# Patient Record
Sex: MALE | Race: WHITE | NOT HISPANIC OR LATINO | Employment: FULL TIME | URBAN - METROPOLITAN AREA
[De-identification: names, ages, dates, MRNs, and addresses within clinical notes are randomized per-mention and may not be internally consistent; named-entity substitution may affect disease eponyms.]

---

## 2022-06-01 ENCOUNTER — OFFICE VISIT (OUTPATIENT)
Dept: FAMILY MEDICINE | Facility: CLINIC | Age: 52
End: 2022-06-01
Payer: COMMERCIAL

## 2022-06-01 ENCOUNTER — PATIENT OUTREACH (OUTPATIENT)
Dept: ONCOLOGY | Facility: CLINIC | Age: 52
End: 2022-06-01

## 2022-06-01 VITALS
RESPIRATION RATE: 18 BRPM | DIASTOLIC BLOOD PRESSURE: 80 MMHG | HEART RATE: 67 BPM | TEMPERATURE: 98.2 F | SYSTOLIC BLOOD PRESSURE: 120 MMHG | WEIGHT: 182 LBS | OXYGEN SATURATION: 98 % | BODY MASS INDEX: 26.96 KG/M2 | HEIGHT: 69 IN

## 2022-06-01 DIAGNOSIS — E83.110 HEREDITARY HEMOCHROMATOSIS (H): Primary | ICD-10-CM

## 2022-06-01 DIAGNOSIS — Z13.220 SCREENING FOR HYPERLIPIDEMIA: ICD-10-CM

## 2022-06-01 DIAGNOSIS — Z00.00 HEALTHCARE MAINTENANCE: ICD-10-CM

## 2022-06-01 DIAGNOSIS — Z76.89 ESTABLISHING CARE WITH NEW DOCTOR, ENCOUNTER FOR: ICD-10-CM

## 2022-06-01 PROBLEM — F33.2 SEVERE EPISODE OF RECURRENT MAJOR DEPRESSIVE DISORDER, WITHOUT PSYCHOTIC FEATURES (H): Status: RESOLVED | Noted: 2017-07-07 | Resolved: 2022-06-01

## 2022-06-01 PROBLEM — F41.1 GENERALIZED ANXIETY DISORDER: Status: ACTIVE | Noted: 2017-07-07

## 2022-06-01 PROBLEM — G47.00 INSOMNIA: Status: RESOLVED | Noted: 2017-07-07 | Resolved: 2022-06-01

## 2022-06-01 PROBLEM — G47.00 INSOMNIA: Status: ACTIVE | Noted: 2017-07-07

## 2022-06-01 LAB
ALBUMIN SERPL-MCNC: 4 G/DL (ref 3.4–5)
ALP SERPL-CCNC: 74 U/L (ref 40–150)
ALT SERPL W P-5'-P-CCNC: 23 U/L (ref 0–70)
ANION GAP SERPL CALCULATED.3IONS-SCNC: 5 MMOL/L (ref 3–14)
AST SERPL W P-5'-P-CCNC: 20 U/L (ref 0–45)
BASOPHILS # BLD AUTO: 0 10E3/UL (ref 0–0.2)
BASOPHILS NFR BLD AUTO: 1 %
BILIRUB SERPL-MCNC: 1.4 MG/DL (ref 0.2–1.3)
BUN SERPL-MCNC: 13 MG/DL (ref 7–30)
CALCIUM SERPL-MCNC: 9 MG/DL (ref 8.5–10.1)
CHLORIDE BLD-SCNC: 104 MMOL/L (ref 94–109)
CHOLEST SERPL-MCNC: 204 MG/DL
CO2 SERPL-SCNC: 28 MMOL/L (ref 20–32)
CREAT SERPL-MCNC: 0.78 MG/DL (ref 0.66–1.25)
EOSINOPHIL # BLD AUTO: 0.1 10E3/UL (ref 0–0.7)
EOSINOPHIL NFR BLD AUTO: 1 %
ERYTHROCYTE [DISTWIDTH] IN BLOOD BY AUTOMATED COUNT: 11.3 % (ref 10–15)
FASTING STATUS PATIENT QL REPORTED: NO
FERRITIN SERPL-MCNC: 44 NG/ML (ref 26–388)
GFR SERPL CREATININE-BSD FRML MDRD: >90 ML/MIN/1.73M2
GLUCOSE BLD-MCNC: 96 MG/DL (ref 70–99)
HCT VFR BLD AUTO: 46.1 % (ref 40–53)
HDLC SERPL-MCNC: 51 MG/DL
HGB BLD-MCNC: 16.1 G/DL (ref 13.3–17.7)
IMM GRANULOCYTES # BLD: 0 10E3/UL
IMM GRANULOCYTES NFR BLD: 0 %
IRON SATN MFR SERPL: 40 % (ref 15–46)
IRON SERPL-MCNC: 109 UG/DL (ref 35–180)
LDLC SERPL CALC-MCNC: 133 MG/DL
LYMPHOCYTES # BLD AUTO: 1.2 10E3/UL (ref 0.8–5.3)
LYMPHOCYTES NFR BLD AUTO: 18 %
MCH RBC QN AUTO: 31.8 PG (ref 26.5–33)
MCHC RBC AUTO-ENTMCNC: 34.9 G/DL (ref 31.5–36.5)
MCV RBC AUTO: 91 FL (ref 78–100)
MONOCYTES # BLD AUTO: 0.6 10E3/UL (ref 0–1.3)
MONOCYTES NFR BLD AUTO: 8 %
NEUTROPHILS # BLD AUTO: 4.8 10E3/UL (ref 1.6–8.3)
NEUTROPHILS NFR BLD AUTO: 72 %
NONHDLC SERPL-MCNC: 153 MG/DL
PLATELET # BLD AUTO: 226 10E3/UL (ref 150–450)
POTASSIUM BLD-SCNC: 4.1 MMOL/L (ref 3.4–5.3)
PROT SERPL-MCNC: 7.5 G/DL (ref 6.8–8.8)
RBC # BLD AUTO: 5.07 10E6/UL (ref 4.4–5.9)
SODIUM SERPL-SCNC: 137 MMOL/L (ref 133–144)
TIBC SERPL-MCNC: 275 UG/DL (ref 240–430)
TRIGL SERPL-MCNC: 98 MG/DL
WBC # BLD AUTO: 6.6 10E3/UL (ref 4–11)

## 2022-06-01 PROCEDURE — 99203 OFFICE O/P NEW LOW 30 MIN: CPT | Performed by: FAMILY MEDICINE

## 2022-06-01 PROCEDURE — 85025 COMPLETE CBC W/AUTO DIFF WBC: CPT | Performed by: FAMILY MEDICINE

## 2022-06-01 PROCEDURE — 80061 LIPID PANEL: CPT | Performed by: FAMILY MEDICINE

## 2022-06-01 PROCEDURE — 83550 IRON BINDING TEST: CPT | Performed by: FAMILY MEDICINE

## 2022-06-01 PROCEDURE — 80053 COMPREHEN METABOLIC PANEL: CPT | Performed by: FAMILY MEDICINE

## 2022-06-01 PROCEDURE — 36415 COLL VENOUS BLD VENIPUNCTURE: CPT | Performed by: FAMILY MEDICINE

## 2022-06-01 PROCEDURE — 82728 ASSAY OF FERRITIN: CPT | Performed by: FAMILY MEDICINE

## 2022-06-01 RX ORDER — MULTIVIT-MIN/IRON/FOLIC ACID/K 18-600-40
CAPSULE ORAL
COMMUNITY

## 2022-06-01 RX ORDER — CHLORAL HYDRATE 500 MG
2 CAPSULE ORAL DAILY
COMMUNITY
End: 2023-03-16

## 2022-06-01 ASSESSMENT — PAIN SCALES - GENERAL: PAINLEVEL: NO PAIN (0)

## 2022-06-01 NOTE — PROGRESS NOTES
Referral received for benign heme services, see below.    Referral reason: Hereditary hemochromatosis    Current abnormal labs: Labs available through Care Everywhere    Preferred location per patient or referral: [place]    Outreach: Call not placed to patient regarding referral.    Plan: Internal Referral: No additional work-up needed, scheduling instructions updated, referral transferred to NPS for completion.

## 2022-06-01 NOTE — PROGRESS NOTES
Assessment & Plan     Hereditary hemochromatosis (H)  Well managed but needs to establish care due to insurance change  Will check HH labs today since he is almost 3mo since last monitoring. I do not suspect severe derangement but just in case needing to escalate urgency with heme   - Adult Oncology/Hematology Referral  - Iron and iron binding capacity  - CBC with platelets and differential  - Ferritin  - Comprehensive metabolic panel (BMP + Alb, Alk Phos, ALT, AST, Total. Bili, TP)  - Iron and iron binding capacity  - CBC with platelets and differential  - Ferritin  - Comprehensive metabolic panel (BMP + Alb, Alk Phos, ALT, AST, Total. Bili, TP)    Screening for hyperlipidemia  Since we were doing blood draw anyway  I discussed pitfalls of non fasting draw but pt elected to have it done today anyway which is reasonable  - Lipid panel reflex to direct LDL Non-fasting  - Lipid panel reflex to direct LDL Non-fasting    Establishing care with new doctor, encounter for  Due to insurance change    Healthcare maintenance  Briefly reviewed HCM needs and confirmed that patient was not up to date, encouraged him to come back for     Return for Routine preventive when able.    Jessica Russell MD  [facility]          Samuel Hua is a 51 year old who presents for the following health issues  accompanied by his self.    History of Present Illness       Reason for visit:  Genetic hemochromotosis    He eats 2-3 servings of fruits and vegetables daily.He consumes 0 sweetened beverage(s) daily.He exercises with enough effort to increase his heart rate 9 or less minutes per day.  He exercises with enough effort to increase his heart rate 3 or less days per week.   He is taking medications regularly.     Insurance changed  Patient is in today for blood test has a hx of hereditary hemochromatosis records can be viewed through care everywhere     Diagnosed he thinks 1.5-2yrs ago    Used to have very  "severe levels and had about \"6 months straight\" of bloodletting to get it into a normal range and now follows up w/r9ssywso blood draws. Has been getting the bloodlettings approximately every 3 months but it changes of course depending on his levels    Sees a group called [facility]  And they do mix of functional, chiro, etc, also has a physcian on staff  Was following w/them and their lab work revealed the HH    Review of Systems   Constitutional, HEENT, cardiovascular, pulmonary, gi and gu systems are negative, except as otherwise noted.      Objective    /80   Pulse 67   Temp 98.2  F (36.8  C) (Tympanic)   Resp 18   Ht 1.753 m (5' 9\")   Wt 82.6 kg (182 lb)   SpO2 98%   BMI 26.88 kg/m    Body mass index is 26.88 kg/m .  Physical Exam   GENERAL: healthy, alert and no distress  RESP: normal respiratory effort, speaking in complete sentences  MS: no gross musculoskeletal defects noted, no edema  PSYCH: mentation appears normal, affect normal/bright    Labs in Epic reviewed (care everywhere)        "

## 2022-06-05 ENCOUNTER — HEALTH MAINTENANCE LETTER (OUTPATIENT)
Age: 52
End: 2022-06-05

## 2022-06-10 NOTE — PROGRESS NOTES
RECORDS STATUS - ALL OTHER DIAGNOSIS      RECORDS RECEIVED FROM: [facility]   DATE RECEIVED: 7/20/2022   NOTES STATUS DETAILS   OFFICE NOTE from referring provider Complete Jessica Paulino MD   OFFICE NOTE from medical oncologist Complete 12/16/2021 Hematology    DISCHARGE SUMMARY from hospital     DISCHARGE REPORT from the ER     MEDICATION LIST Complete [facility]   CLINICAL TRIAL TREATMENTS TO DATE     LABS     PATHOLOGY REPORTS     ANYTHING RELATED TO DIAGNOSIS Complete Labs last updated on 6/1/2022   GENONOMIC TESTING     TYPE:     IMAGING (NEED IMAGES & REPORT)     CT SCANS     MRI     MAMMO     ULTRASOUND     PET

## 2022-06-29 ENCOUNTER — TELEPHONE (OUTPATIENT)
Dept: FAMILY MEDICINE | Facility: CLINIC | Age: 52
End: 2022-06-29

## 2022-06-29 NOTE — TELEPHONE ENCOUNTER
Patient Quality Outreach    Patient is due for the following:   Colon Cancer Screening -  FIT, Colonoscopy and Cologuard  Depression  -  PHQ-9 Needed  Physical  - due    NEXT STEPS:   Schedule a yearly physical    Type of outreach:    Sent DAQRI message.    Next Steps:  Reach out within 90 days via DAQRI.    Max number of attempts reached: No. Will try again in 90 days if patient still on fail list.    Questions for provider review:    None     Pat Reyes, CMA

## 2022-07-05 ENCOUNTER — VIRTUAL VISIT (OUTPATIENT)
Dept: FAMILY MEDICINE | Facility: CLINIC | Age: 52
End: 2022-07-05
Payer: COMMERCIAL

## 2022-07-05 DIAGNOSIS — F41.9 ANXIETY: ICD-10-CM

## 2022-07-05 DIAGNOSIS — G47.00 INSOMNIA, UNSPECIFIED TYPE: ICD-10-CM

## 2022-07-05 DIAGNOSIS — F33.2 SEVERE EPISODE OF RECURRENT MAJOR DEPRESSIVE DISORDER, WITHOUT PSYCHOTIC FEATURES (H): Primary | ICD-10-CM

## 2022-07-05 PROCEDURE — 99215 OFFICE O/P EST HI 40 MIN: CPT | Mod: GT | Performed by: FAMILY MEDICINE

## 2022-07-05 PROCEDURE — 96127 BRIEF EMOTIONAL/BEHAV ASSMT: CPT | Mod: GT | Performed by: FAMILY MEDICINE

## 2022-07-05 RX ORDER — HYDROXYZINE HYDROCHLORIDE 25 MG/1
12.5-5 TABLET, FILM COATED ORAL 3 TIMES DAILY PRN
Qty: 60 TABLET | Refills: 1 | Status: SHIPPED | OUTPATIENT
Start: 2022-07-05 | End: 2022-08-06

## 2022-07-05 RX ORDER — VENLAFAXINE HYDROCHLORIDE 150 MG/1
37.5 TABLET, EXTENDED RELEASE ORAL DAILY
Qty: 90 TABLET | Refills: 0 | COMMUNITY
Start: 2022-07-05 | End: 2023-02-27

## 2022-07-05 ASSESSMENT — PATIENT HEALTH QUESTIONNAIRE - PHQ9
SUM OF ALL RESPONSES TO PHQ QUESTIONS 1-9: 21
SUM OF ALL RESPONSES TO PHQ QUESTIONS 1-9: 21
10. IF YOU CHECKED OFF ANY PROBLEMS, HOW DIFFICULT HAVE THESE PROBLEMS MADE IT FOR YOU TO DO YOUR WORK, TAKE CARE OF THINGS AT HOME, OR GET ALONG WITH OTHER PEOPLE: EXTREMELY DIFFICULT

## 2022-07-05 ASSESSMENT — ANXIETY QUESTIONNAIRES
4. TROUBLE RELAXING: NEARLY EVERY DAY
5. BEING SO RESTLESS THAT IT IS HARD TO SIT STILL: NOT AT ALL
6. BECOMING EASILY ANNOYED OR IRRITABLE: NOT AT ALL
GAD7 TOTAL SCORE: 13
GAD7 TOTAL SCORE: 13
2. NOT BEING ABLE TO STOP OR CONTROL WORRYING: NEARLY EVERY DAY
1. FEELING NERVOUS, ANXIOUS, OR ON EDGE: NEARLY EVERY DAY
GAD7 TOTAL SCORE: 13
8. IF YOU CHECKED OFF ANY PROBLEMS, HOW DIFFICULT HAVE THESE MADE IT FOR YOU TO DO YOUR WORK, TAKE CARE OF THINGS AT HOME, OR GET ALONG WITH OTHER PEOPLE?: EXTREMELY DIFFICULT
7. FEELING AFRAID AS IF SOMETHING AWFUL MIGHT HAPPEN: SEVERAL DAYS
7. FEELING AFRAID AS IF SOMETHING AWFUL MIGHT HAPPEN: SEVERAL DAYS
3. WORRYING TOO MUCH ABOUT DIFFERENT THINGS: NEARLY EVERY DAY

## 2022-07-05 NOTE — PROGRESS NOTES
Javid is a 51 year old who is being evaluated via a billable video visit.             Assessment & Plan        Severe episode of recurrent major depressive disorder, without psychotic features (H)  Continue venlafaxine 150 mg daily.  We will hold the Wellbutrin for now.  He will follow-up with his PCP in about 6 to 8 weeks.  Earlier as needed.  Discussed it is common to see an increase in anxiety temporarily for couple weeks after starting venlafaxine.  In the past, Wellbutrin was added for reduced libido and symptoms are used for that purpose if needed.  - Adult Mental Health  Referral; Future     Anxiety  See above.  Also discussed that Wellbutrin can contribute to an increase in anxiety.    Insomnia, unspecified type  This may be due to combination of Wellbutrin and the fact that he just recently started back on Effexor.  We will stop the trazodone for now because it has not been very helpful and also has potential interaction with venlafaxine with increased risk of serotonin syndrome.  We will try hydroxyzine at bedtime as instructed.  Suspect the insomnia will get better off the Wellbutrin and as his body adjusts to the venlafaxine and his anxiety level declines.  - hydrOXYzine (ATARAX) 25 MG tablet; Take 0.5-2 tablets (12.5-50 mg) by mouth 3 times daily as needed for anxiety  Dispense: 60 tablet; Refill: 1     Answers for HPI/ROS submitted by the patient on 7/5/2022  If you checked off any problems, how difficult have these problems made it for you to do your work, take care of things at home, or get along with other people?: Extremely difficult  PHQ9 TOTAL SCORE: 21  BENNY 7 TOTAL SCORE: 13    Patient Instructions   1. Stop the wellbutrin for now. It can be added back in if you continue to have depression and anxiety symptoms or if you find your libido is declining on the venlafaxine. Wellbutrin may be contributing to your current anxiety and insomnia.  2. You may also be noticing more anxiety because  "anxiety is common when first starting medicines like venlafaxine. The portion that is due to getting back on venlafaxine should be temporary and is typically improved after a couple of weeks on the medicine.   3. Schedule a follow up visit with Dr. Russell in 6-8 weeks.   4. You can try hydroxyzine 12.5-50mg at bedtime for insomnia in the meantime. Stop the trazodone (it has potential interaction with venlafaxine, though it could be a consideration in the future if insomnia continues). Your insomnia will likely get better as the anxiety is under better control.   5. Schedule with a therapist (you will receive a call to schedule in the next couple of days)            47 minutes spent on the date of the encounter doing chart review, history and exam, documentation and further activities per the note       BMI:   Estimated body mass index is 26.88 kg/m  as calculated from the following:    Height as of 6/1/22: 1.753 m (5' 9\").    Weight as of 6/1/22: 82.6 kg (182 lb).             Return in about 6 weeks (around 8/16/2022) for Follow up with Dr. Russell.    Crys Miller MD  [facility]    Samuel Hua is a 51 year old accompanied by his spouse Yessenia, presenting for the following health issues:  Anxiety and Insomnia      HPI       Javid reports a history of depression and anxiety for many years.  He was initially started on citalopram and found that effective.  He eventually went off the medication on his own because he was feeling well and some months after that noticed return of symptoms and started back on the medication.  He went to visit his doctor at that time in 2017 who switched him to venlafaxine.  He continued to have depression symptoms and was in an outpatient program for few weeks and Wellbutrin was added at that time.  He thinks that may have also been due to reduced libido.  He had been on the venlafaxine and Wellbutrin up until this past year and doing very well.  He decided to " try tapering off both of those medications.  Once he was completely off the medications, he noticed after few months his symptoms returned.  He went to his functional medicine provider who suggested restarting the Wellbutrin to help with anxiety, then had him restart the venlafaxine about a week ago.  In the meantime, he has noticed an increase in anxiety and insomnia as well.  She also started him on trazodone, and he was taking 25 mg at bedtime and repeating the dose a few hours later if needed.  That was not working well, so his dose was increased to 50 mg at bedtime with an additional 50 mg overnight if needed, then it was increased to 100 mg at bedtime.  He finds he falls asleep fine with the medication, but only sleeps for few hours and then wakes up and notices that he cannot get back to sleep.  Was unable to schedule with his PCP, so scheduled this visit today.      PHQ 7/5/2022 7/5/2022   PHQ-9 Total Score 21 21   Q9: Thoughts of better off dead/self-harm past 2 weeks Not at all Not at all     BENNY-7 SCORE 7/5/2022 7/5/2022   Total Score - 13 (moderate anxiety)   Total Score 13 13     PATIENT HEALTH QUESTIONNAIRE-9 (PHQ - 9)    Over the last 2 weeks, how often have you been bothered by any of the following problems?    1. Little interest or pleasure in doing things -  Nearly every day   2. Feeling down, depressed, or hopeless -  Nearly every day   3. Trouble falling or staying asleep, or sleeping too much - Nearly every day   4. Feeling tired or having little energy -  Nearly every day   5. Poor appetite or overeating -  Nearly every day   6. Feeling bad about yourself - or that you are a failure or have let yourself or your family down -  Nearly every day   7. Trouble concentrating on things, such as reading the newspaper or watching television - Nearly every day   8. Moving or speaking so slowly that other people could have noticed? Or the opposite - being so fidgety or restless that you have been moving  around a lot more than usual Not at all   9. Thoughts that you would be better off dead or of hurting  yourself in some way Not at all   Total Score: 21     If you checked off any problems, how difficult have these problems made it for you to do your work, take care of things at home, or get along with other people?      Developed by Tejas Smith, Sarah Cantor, Rivera Murphy and colleagues, with an educational radha from [employer] No permission required to reproduce, translate, display or distribute. permission required to reproduce, translate, display or distribute.      Review of Systems          Objective           Vitals:  No vitals were obtained today due to virtual visit.    Physical Exam   GENERAL: Healthy, alert and no distress  EYES: Eyes grossly normal to inspection.  No discharge or erythema, or obvious scleral/conjunctival abnormalities.  RESP: No audible wheeze, cough, or visible cyanosis.  No visible retractions or increased work of breathing.    SKIN: Visible skin clear. No significant rash, abnormal pigmentation or lesions.  NEURO: Cranial nerves grossly intact.  Mentation and speech appropriate for age.  PSYCH: Mentation appears normal, affect normal/bright, judgement and insight intact, normal speech and appearance well-groomed.                  Video-Visit Details    Video Start Time: 12:40    Type of service:  Video Visit    Video End Time:1:20 PM    Originating Location (pt. Location): Home    Distant Location (provider location):  [facility]     Platform used for Video Visit: [employer]  [place].

## 2022-07-05 NOTE — PATIENT INSTRUCTIONS
Stop the wellbutrin for now. It can be added back in if you continue to have depression and anxiety symptoms or if you find your libido is declining on the venlafaxine. Wellbutrin may be contributing to your current anxiety and insomnia.  You may also be noticing more anxiety because anxiety is common when first starting medicines like venlafaxine. The portion that is due to getting back on venlafaxine should be temporary and is typically improved after a couple of weeks on the medicine.   Schedule a follow up visit with Dr. Russell in 6-8 weeks.   You can try hydroxyzine 12.5-50mg at bedtime for insomnia in the meantime. Stop the trazodone (it has potential interaction with venlafaxine, though it could be a consideration in the future if insomnia continues). Your insomnia will likely get better as the anxiety is under better control.   Schedule with a therapist (you will receive a call to schedule in the next couple of days)

## 2022-07-19 ENCOUNTER — VIRTUAL VISIT (OUTPATIENT)
Dept: FAMILY MEDICINE | Facility: CLINIC | Age: 52
End: 2022-07-19
Payer: COMMERCIAL

## 2022-07-19 DIAGNOSIS — G47.00 INSOMNIA, UNSPECIFIED TYPE: Primary | ICD-10-CM

## 2022-07-19 PROCEDURE — 99214 OFFICE O/P EST MOD 30 MIN: CPT | Mod: TEL | Performed by: FAMILY MEDICINE

## 2022-07-19 PROCEDURE — 96127 BRIEF EMOTIONAL/BEHAV ASSMT: CPT | Mod: TEL | Performed by: FAMILY MEDICINE

## 2022-07-19 ASSESSMENT — ANXIETY QUESTIONNAIRES
1. FEELING NERVOUS, ANXIOUS, OR ON EDGE: NEARLY EVERY DAY
5. BEING SO RESTLESS THAT IT IS HARD TO SIT STILL: NOT AT ALL
7. FEELING AFRAID AS IF SOMETHING AWFUL MIGHT HAPPEN: SEVERAL DAYS
2. NOT BEING ABLE TO STOP OR CONTROL WORRYING: NEARLY EVERY DAY
3. WORRYING TOO MUCH ABOUT DIFFERENT THINGS: NEARLY EVERY DAY
GAD7 TOTAL SCORE: 13
6. BECOMING EASILY ANNOYED OR IRRITABLE: NOT AT ALL
GAD7 TOTAL SCORE: 13

## 2022-07-19 ASSESSMENT — PATIENT HEALTH QUESTIONNAIRE - PHQ9
SUM OF ALL RESPONSES TO PHQ QUESTIONS 1-9: 21
5. POOR APPETITE OR OVEREATING: NEARLY EVERY DAY

## 2022-07-19 NOTE — PATIENT INSTRUCTIONS
Patient Education     Treating Insomnia     Learning to relax before bedtime can improve your sleep.   Good sleeping habits are a key part of treatment. If needed, some medicines may help you sleep better at first. Making healthy lifestyle changes and learning to relax can improve your sleep. Treating insomnia takes commitment. But trust that your efforts will pay off. Be sure to talk with your healthcare provider before taking any medicine.  Healthy lifestyle  Your lifestyle affects your health and your sleep. Here are some healthy habits:  Keep a regular sleep schedule. Go to bed and get up at the same time each day.  Exercise regularly. It may help you reduce stress. Avoid strenuous exercise for 2 to 4 hours before bedtime.  Avoid or limit naps, especially in the late afternoon.  Use your bed only for sleep and sex.  Don t spend too much time in bed trying to fall asleep. If you can t fall asleep, get up and do something (no electronics) until you become tired and drowsy.  Avoid or limit caffeine and nicotine for up to 6 hours before bedtime. They can keep you awake at night.   Also avoid alcohol for at least 4 to 6 hours before bedtime. It may help you fall asleep at first. But you will have more awakenings during the night. And your sleep will not be restful.  Before bedtime  To sleep better every night, try these tips:  Have a bedtime routine to let your body and mind know when it s time to sleep.  Think of going to bed as relaxing and enjoyable. Sleep will come sooner.  If your worries don t let you sleep, write them down in a diary. Then close it, and go to bed.  Make sure the room is not too hot or too cold. If it s not dark enough, an eye mask can help. If it s noisy, try using earplugs.  Don't eat a large meal just before bedtime.  Remove noises, bright lights, TVs, cell phones, and computers from your sleeping environment.  Use a comfortable mattress and pillow.  Learn to relax  Stress, anxiety, and  body tension may keep you awake at night. To unwind before bedtime, try a warm bath, meditation, or yoga. Also try the following:  Deep breathing. Sit or lie back in a chair. Take a slow, deep breath. Hold it for 5 counts. Then breathe out slowly through your mouth. Keep doing this until you feel relaxed.  Progressive muscle relaxation. Tense and then relax the muscles in your body as you breathe deeply. Start with your feet and work up your body to your neck and face.  Cognitive Behavioral Therapy (CBT)  CBT is the most effective treatment for long-term insomnia. It tries to address the underlying causes of your sleep problems, including your habits and how you think about sleep.  Individual Therapy  Dorian Victoria PsyD, BRIANA  Insomnia   [facility]: [phone]  [facility]: [phone]  [facility]  Visit www.[place].org or call [phone] to find a clinic close to you.  Suggested resources  The resources below may help you relax. This list is for information only. [facility] is not responsible for the quality of services or the actions of any person or organization. There may be a fee to use some of these resources.  Insomnia treatment books  Marty Mind by Anabel Crowder and Cecilia Ferrari (2013)  Overcoming Insomnia by Aime Sierra and Anabel Crowder (2008)  Quiet Your Mind and Get to Sleep: Solutions to Insomnia for Those with Depression, Anxiety or Chronic Pain by Anabel Crowder and Cecilia Ferrari (2009)  No More Sleepless Nights by Romel Jordan and Katy Spear (1996)  Say Marty to Insomnia by Cesar Rooney (2009)  The Insomnia Workbook by Raissa Marcano and Juan A Obregon (2009)  The Insomnia Answer by Eleazar Ackerman and Dutch Rios (2006)  Stress management and relaxation books  The Relaxation and Stress Reduction Workbook by Sirena Olmedo, Medina Jean and  Robles Edwards (2008)  Stress Management Workbook: Techniques and Self-Assessment Procedures by Kim Burrell and Augie Craft (1997)  A Mindfulness-Based Stress Reduction Workbook by Dawson Og and Suzanne Richardson (2010)  The Complete Stress Management Workbook by Marcus Acosta, Raciel Quach and Scooter Ruggiero (1996)  Online programs  www.SHUTi.me (pronounced shut eye). There is a fee for this program.   www.sleepIO.com (pronounced sleep ee oh). There is a fee for this program.  Other online resources  Deep breathing and progressive muscle relaxation (PMR):  http://www.Trustlook  Meditation:  www.Acorn InternationalranWebsand  www.CinedigmguidedDoormen.meditationDoormen.site.CaseRev  Guided imagery:  http://www.Trustlook  http://[employer].CaseRev  (click on  Resource Library,  then choose  Meditation, Relaxation, Guided Imagery )  Apps for your mobile device:  Autogenic Training Progressive Muscle Relaxation by Diversion  Calm: Meditation and Sleep Stories by Calm.com, Inc.  Conductrics Timer - Meditation Fernando by [employer].  This is not a prescription and these resources are optional. You must pay for any costs when using these resources. Please ask your insurance carrier if you can be reimbursed for these resources. If so, you are responsible for sending the needed details to your insurance carrier. These resources may also be tax deductible as medical expenses. Check with your .  Date Last Reviewed: 5/18/2018  Clinically reviewed by Dorian Victoria PsyD, LP, [facility], Director of the Insomnia and Behavioral Sleep Health Program, [facility].    6304-6016 [employer]. [address] [place] [address], [place] [address]. All rights reserved. This information is not intended as a substitute for professional medical care. Always follow your healthcare professional's instructions.

## 2022-07-19 NOTE — COMMUNITY RESOURCES LIST (ENGLISH)
07/19/2022   [facility] - Outpatient Clinics  N/A  For questions about this resource list or additional care needs, please contact your primary care clinic or care manager.  Phone: [phone]   Email: N/A   Address: [address] [place] [address]   Hours: N/A        Hotlines and Helplines       Hotline - Crisis help  1  [place] - [facility] Department Distance: 12.68 miles      COVID-19 Status: Phone/Virtual   [address] [facility] [address] [place] [address]  Language: English  Hours: Mon - Sun Open 24 Hours   Phone: (913) 896-4710 Website: https://www.[facility].org/     2  [facility] Distance: 17.36 miles      COVID-19 Status: Phone/Virtual   [address] [facility] [place] [address]  Language: English  Hours: Mon - Fri 8:00 AM - 4:00 PM   Phone: (608) 432-5563 Email: Vasquez@PartyWithMe Website: https://PartyWithMe/          Mental Health       Individual counseling  3  [facility] Distance: 3.64 miles      COVID-19 Status: Regular Operations, COVID-19 Status: Phone/Virtual   [address] [place] [address]  Language: English  Hours: Mon - Thu 8:00 AM - 4:30 PM , Fri 8:00 AM - 3:00 PM  Fees: Insurance, Self Pay   Phone: (441) 638-3954 Email: [email or web address] Website: https://www.Latio/family-therapy-[facility]gu-jvmrp-pw.html     4  [facility] Distance: 3.66 miles      COVID-19 Status: Phone/Virtual   [address] [place] [address]  Language: English  Hours: Mon - Thu 9:00 AM - 8:00 PM  Fees: Insurance, Self Pay, Sliding Fee   Phone: (761) 638-2192 Email: mika@Noblivity Website: http://www.Noblivity/     Mental health crisis care  5  [facility] - Crisis Assessment and Stabilization Services Distance: 16.98 miles      COVID-19 Status: Regular Operations, COVID-19 Status: Phone/Virtual   [address] [facility] [address] [place] [address]  Language: English  Hours: Mon - Sun Open 24 Hours  Fees: Insurance, Self Pay, Sliding Fee   Phone: (843) 994-3660 Email: info@Bit9 Website: https://www.Bit9/location/[facility]/     Mental health support group  6  [facility] - Caregiver Support Groups Distance: 23.84 miles      COVID-19 Status: Service Unavailable   [address] [place] [address]  Language: English  Hours: Wed 1:00 PM - 3:00 PM  Fees: Insurance, Self Pay   Phone: (571) 207-1547 Email: familymeans@Responsys Website: https://www.Responsys/          Important Numbers & Websites       Emergency Services   [address]  [place] [address]  Poison Control   (271) 105-2929  Suicide Prevention Lifeline   (223) 304-7861 (TALK)  Child Abuse Hotline   (683) 313-1988 (4-A-Child)  Sexual Assault Hotline   (435) 964-2159 (HOPE)  National Runaway Safeline   (796) 245-6154 (RUNAWAY)  All-Options Talkline   (266) 462-3909  Substance Abuse Referral   (631) 344-6489 (HELP)

## 2022-07-19 NOTE — PROGRESS NOTES
Javid is a 51 year old who is being evaluated via a billable telephone visit.      What phone number would you like to be contacted at? [phone]   How would you like to obtain your AVS? Emile    Assessment & Plan      Insomnia, unspecified type  Continues to struggle with insomnia.  Hydroxyzine helps temporarily.  He may continue to use the hydroxyzine as needed as instructed.   he reports Ambien and trazodone did not work  Heart raced after taking lorazepam.    He does remain off Wellbutrin.  He did not like how he felt on trazodone.  I recommended scheduling with [facility] and also provided a referral for the sleep clinic.  I recommended following up with his PCP to discuss ongoing plan I provided information and patient instructions on managing insomnia as well.      Has appt with psychiatry at [facility] August 31 and counseling July 28 .     Severe episode of recurrent major depressive disorder, without psychotic features (H)   uncontrolled.  Continues venlafaxine 150 mg daily.  Recommended follow-up in 1 month.  Discussed it can take a while for medication to take full effect.    Continue venlafaxine 150 mg daily.  We will hold the Wellbutrin for now.  He will follow-up with his PCP in about 6 to 8 weeks.  Earlier as needed.  Discussed it is common to see an increase in anxiety temporarily for couple weeks after starting venlafaxine.  In the past, Wellbutrin was added for reduced libido and symptoms are used for that purpose if needed.  - Adult Mental Health  Referral; Future      Anxiety  See above.               Patient Instructions       Patient Education     Treating Insomnia     Learning to relax before bedtime can improve your sleep.   Good sleeping habits are a key part of treatment. If needed, some medicines may help you sleep better at first. Making healthy lifestyle changes and learning to relax can improve your sleep. Treating insomnia takes commitment. But trust that your efforts will pay off.  Be sure to talk with your healthcare provider before taking any medicine.  Healthy lifestyle  Your lifestyle affects your health and your sleep. Here are some healthy habits:    Keep a regular sleep schedule. Go to bed and get up at the same time each day.    Exercise regularly. It may help you reduce stress. Avoid strenuous exercise for 2 to 4 hours before bedtime.    Avoid or limit naps, especially in the late afternoon.    Use your bed only for sleep and sex.    Don t spend too much time in bed trying to fall asleep. If you can t fall asleep, get up and do something (no electronics) until you become tired and drowsy.    Avoid or limit caffeine and nicotine for up to 6 hours before bedtime. They can keep you awake at night.     Also avoid alcohol for at least 4 to 6 hours before bedtime. It may help you fall asleep at first. But you will have more awakenings during the night. And your sleep will not be restful.  Before bedtime  To sleep better every night, try these tips:    Have a bedtime routine to let your body and mind know when it s time to sleep.    Think of going to bed as relaxing and enjoyable. Sleep will come sooner.    If your worries don t let you sleep, write them down in a diary. Then close it, and go to bed.    Make sure the room is not too hot or too cold. If it s not dark enough, an eye mask can help. If it s noisy, try using earplugs.    Don't eat a large meal just before bedtime.    Remove noises, bright lights, TVs, cell phones, and computers from your sleeping environment.    Use a comfortable mattress and pillow.  Learn to relax  Stress, anxiety, and body tension may keep you awake at night. To unwind before bedtime, try a warm bath, meditation, or yoga. Also try the following:    Deep breathing. Sit or lie back in a chair. Take a slow, deep breath. Hold it for 5 counts. Then breathe out slowly through your mouth. Keep doing this until you feel relaxed.    Progressive muscle relaxation. Tense  and then relax the muscles in your body as you breathe deeply. Start with your feet and work up your body to your neck and face.  Cognitive Behavioral Therapy (CBT)  CBT is the most effective treatment for long-term insomnia. It tries to address the underlying causes of your sleep problems, including your habits and how you think about sleep.  Individual Therapy  Dorian Victoria PsyD, BRIANA  Insomnia   [facility]    [facility]: [phone]    [facility]: [phone]  [facility]  Visit www.[place].org or call [phone] to find a clinic close to you.  Suggested resources  The resources below may help you relax. This list is for information only. [facility] is not responsible for the quality of services or the actions of any person or organization. There may be a fee to use some of these resources.  Insomnia treatment books  Marty Mind by Anabel Crowder and Cecliia Ferrari (2013)  Overcoming Insomnia by Aime Sierra and Anabel Crowder (2008)  Quiet Your Mind and Get to Sleep: Solutions to Insomnia for Those with Depression, Anxiety or Chronic Pain by Anabel Crowder and Cecilia Ferrari (2009)  No More Sleepless Nights by Romel Jordan and Katy Spear (1996)  Say Marty to Insomnia by Cesar Rooney (2009)  The Insomnia Workbook by Raissa Marcano and Juan A Obregon (2009)  The Insomnia Answer by Eleazar Ackerman and Dutch Rios (2006)  Stress management and relaxation books  The Relaxation and Stress Reduction Workbook by Sirena Olmedo, Medina Jean and Robles Edwards (2008)  Stress Management Workbook: Techniques and Self-Assessment Procedures by Kim Burrell and Augie Craft (1997)  A Mindfulness-Based Stress Reduction Workbook by Dawson Og and Suzanne Richardson (2010)  The Complete Stress Management Workbook by Marcus Acosta, Raciel Quach and Scooter Ruggiero (1996)  Online  "programs  www.SHUTi.me (pronounced shut eye). There is a fee for this program.   www.sleepIO.com (pronounced sleep ee oh). There is a fee for this program.  Other online resources  Deep breathing and progressive muscle relaxation (PMR):    http://www.Rhythm NewMedia.Preview Networks  Meditation:    www.fragrantheart.Preview Networks    www.The Outlaw Bar and GrillguidedWEbookmeditationWEbooksite.Preview Networks  Guided imagery:    http://www.United Maps    http://SmartGrains.Preview Networks  (click on  Resource Library,  then choose  Meditation, Relaxation, Guided Imagery )  Apps for your mobile device:    Autogenic Training Progressive Muscle Relaxation by Vivo    Calm: Meditation and Sleep Stories by "[employer]oku[employer] Inc[employer] Inc.    Floxx Timer - Meditation Fernando by [employer].  This is not a prescription and these resources are optional. You must pay for any costs when using these resources. Please ask your insurance carrier if you can be reimbursed for these resources. If so, you are responsible for sending the needed details to your insurance carrier. These resources may also be tax deductible as medical expenses. Check with your .  Date Last Reviewed: 5/18/2018  Clinically reviewed by Dorian Victoria PsyD, BRIANA, [facility], Director of the Insomnia and Behavioral Sleep Health Program, [facility].    6467-9397 [employer]. [address] [place] [address]. All rights reserved. This information is not intended as a substitute for professional medical care. Always follow your healthcare professional's instructions.               BMI:   Estimated body mass index is 26.88 kg/m  as calculated from the following:    Height as of 6/1/22: 1.753 m (5' 9\").    Weight as of 6/1/22: 82.6 kg (182 lb).           No follow-ups on file.    MD MOHSEN Abraham [facility]    Samuel Hua is a 51 year old , presenting for the following health issues:  Depression and Anxiety      HPI     Continues to struggle with " "depression and anxiety. Continues effexor 150mg daily. Stopped wellbutrin. Insomnia continues to be a struggle.     How many servings of fruits and vegetables do you eat daily?  2-3    On average, how many sweetened beverages do you drink each day (Examples: soda, juice, sweet tea, etc.  Do NOT count diet or artificially sweetened beverages)?   0    How many days per week do you exercise enough to make your heart beat faster? 4    How many minutes a day do you exercise enough to make your heart beat faster? 30 - 60    How many days per week do you miss taking your medication? 0    PHQ 7/5/2022 7/5/2022 7/19/2022   PHQ-9 Total Score 21 21 21   Q9: Thoughts of better off dead/self-harm past 2 weeks Not at all Not at all Not at all     BENNY-7 SCORE 7/5/2022 7/5/2022 7/19/2022   Total Score - 13 (moderate anxiety) -   Total Score 13 13 13           Review of Systems         Objective    Vitals - Patient Reported  Weight (Patient Reported): 80.7 kg (178 lb)  Height (Patient Reported): 509 cm (16' 8.39\")  BMI (Based on Pt Reported Ht/Wt): 3.12        Physical Exam   alert and no distress  PSYCH: Alert and oriented times 3; coherent speech, normal   rate and volume, able to articulate logical thoughts, able   to abstract reason, no tangential thoughts, no hallucinations   or delusions  His affect is normal  RESP: No cough, no audible wheezing, able to talk in full sentences  Remainder of exam unable to be completed due to telephone visits          Phone call duration: 14 minutes    .  ..  "

## 2022-07-20 ENCOUNTER — MYC MEDICAL ADVICE (OUTPATIENT)
Dept: BEHAVIORAL HEALTH | Facility: CLINIC | Age: 52
End: 2022-07-20

## 2022-07-20 ENCOUNTER — ONCOLOGY VISIT (OUTPATIENT)
Dept: ONCOLOGY | Facility: CLINIC | Age: 52
End: 2022-07-20
Attending: FAMILY MEDICINE
Payer: COMMERCIAL

## 2022-07-20 ENCOUNTER — PRE VISIT (OUTPATIENT)
Dept: ONCOLOGY | Facility: CLINIC | Age: 52
End: 2022-07-20

## 2022-07-20 ENCOUNTER — OFFICE VISIT (OUTPATIENT)
Dept: BEHAVIORAL HEALTH | Facility: CLINIC | Age: 52
End: 2022-07-20
Payer: COMMERCIAL

## 2022-07-20 VITALS
TEMPERATURE: 98.6 F | WEIGHT: 184 LBS | HEART RATE: 82 BPM | HEIGHT: 70 IN | RESPIRATION RATE: 12 BRPM | SYSTOLIC BLOOD PRESSURE: 117 MMHG | OXYGEN SATURATION: 95 % | DIASTOLIC BLOOD PRESSURE: 82 MMHG | BODY MASS INDEX: 26.34 KG/M2

## 2022-07-20 DIAGNOSIS — F41.1 GAD (GENERALIZED ANXIETY DISORDER): Primary | ICD-10-CM

## 2022-07-20 DIAGNOSIS — E83.110 HEREDITARY HEMOCHROMATOSIS (H): Primary | ICD-10-CM

## 2022-07-20 PROCEDURE — 90837 PSYTX W PT 60 MINUTES: CPT | Performed by: SOCIAL WORKER

## 2022-07-20 PROCEDURE — G0463 HOSPITAL OUTPT CLINIC VISIT: HCPCS

## 2022-07-20 PROCEDURE — 99204 OFFICE O/P NEW MOD 45 MIN: CPT | Performed by: INTERNAL MEDICINE

## 2022-07-20 RX ORDER — HEPARIN SODIUM,PORCINE 10 UNIT/ML
5 VIAL (ML) INTRAVENOUS
Status: CANCELLED | OUTPATIENT
Start: 2022-09-07

## 2022-07-20 RX ORDER — HEPARIN SODIUM (PORCINE) LOCK FLUSH IV SOLN 100 UNIT/ML 100 UNIT/ML
5 SOLUTION INTRAVENOUS
Status: CANCELLED | OUTPATIENT
Start: 2022-09-07

## 2022-07-20 ASSESSMENT — ANXIETY QUESTIONNAIRES
GAD7 TOTAL SCORE: 13
IF YOU CHECKED OFF ANY PROBLEMS ON THIS QUESTIONNAIRE, HOW DIFFICULT HAVE THESE PROBLEMS MADE IT FOR YOU TO DO YOUR WORK, TAKE CARE OF THINGS AT HOME, OR GET ALONG WITH OTHER PEOPLE: EXTREMELY DIFFICULT
3. WORRYING TOO MUCH ABOUT DIFFERENT THINGS: NEARLY EVERY DAY
GAD7 TOTAL SCORE: 13
7. FEELING AFRAID AS IF SOMETHING AWFUL MIGHT HAPPEN: SEVERAL DAYS
4. TROUBLE RELAXING: NEARLY EVERY DAY
1. FEELING NERVOUS, ANXIOUS, OR ON EDGE: NEARLY EVERY DAY
6. BECOMING EASILY ANNOYED OR IRRITABLE: NOT AT ALL
2. NOT BEING ABLE TO STOP OR CONTROL WORRYING: NEARLY EVERY DAY
5. BEING SO RESTLESS THAT IT IS HARD TO SIT STILL: NOT AT ALL
7. FEELING AFRAID AS IF SOMETHING AWFUL MIGHT HAPPEN: SEVERAL DAYS
8. IF YOU CHECKED OFF ANY PROBLEMS, HOW DIFFICULT HAVE THESE MADE IT FOR YOU TO DO YOUR WORK, TAKE CARE OF THINGS AT HOME, OR GET ALONG WITH OTHER PEOPLE?: EXTREMELY DIFFICULT

## 2022-07-20 ASSESSMENT — PAIN SCALES - GENERAL: PAINLEVEL: NO PAIN (0)

## 2022-07-20 NOTE — PROGRESS NOTES
"Oncology Rooming Note    July 20, 2022 12:56 PM   Javid Amaya is a 51 year old male who presents for:    Chief Complaint   Patient presents with     Hematology     New - Hereditary hemochromatosis      Initial Vitals: /82 (BP Location: Right arm, Patient Position: Sitting, Cuff Size: Adult Regular)   Pulse 82   Temp 98.6  F (37  C) (Tympanic)   Resp 12   Ht 1.765 m (5' 9.5\")   Wt 83.5 kg (184 lb)   SpO2 95%   BMI 26.78 kg/m   Estimated body mass index is 26.78 kg/m  as calculated from the following:    Height as of this encounter: 1.765 m (5' 9.5\").    Weight as of this encounter: 83.5 kg (184 lb). Body surface area is 2.02 meters squared.  No Pain (0) Comment: Data Unavailable   No LMP for male patient.  Allergies reviewed: Yes  Medications reviewed: Yes    Medications: Medication refills not needed today.  Pharmacy name entered into [facility]: [facility] [id number] [facility]    Clinical concerns:  None      Anne Dean, MAYNOR            "

## 2022-07-20 NOTE — LETTER
7/20/2022         RE: Javid Amaya  [address] [place] [address]        Dear Colleague,    Thank you for referring your patient, Javid Amaya, to the [facility]. Please see a copy of my visit note below.    [facility] Hematology and Oncology Outpatient Consult Note    Patient: Javid Amaya  MRN: [id number]        Reason for Visit    I was consulted by  regarding hereditary hemochromatosis    Assessment/Plan    It was a privilege to evaluate you for the following issues today:    1. Hereditary hemochromatosis (H)      I recommend to check a hemoglobin and ferritin every 12 weeks (starting on September 7, 2022) and if your ferritin is above 50 and your hemoglobin remains above 11 g/DL to proceed with a 1 unit whole blood phlebotomy. 500 cc of blood will be removed each time if tolerated.  Your phlebotomy can be done on the same day as your blood is being drawn all day following Wednesday since you told me that coming in on Wednesday afternoons is more convenient for you.     I would recommend that you see me for a follow-up office visit annually so no later than July 2023.    I spent a total of 46 minutes on the care of this patient on the day of service including face to face time and the remainder in chart review, care coordination, and documentation on the day of service.    History  Mr. Javid Amaya is a 51 year old with hereditary hemochromatosis, homozygous for C282Y mutation per previous documentation by Dr. Thea Fay from [facility].    He tells me that he required twice weekly phlebotomies initially but then his hemoglobin dropped and he continued with weekly phlebotomies for quite some time last year until his ferritin went below 50.  His last ferritin level from June of this year was 44 and he has not had a phlebotomy for a few months now.    He is overriding issue at this time is uncontrolled anxiety.  He is scheduled to see a  "psychiatrist for that in August.      Current Outpatient Medications   Medication     fish oil-omega-3 fatty acids 1000 MG capsule     hydrOXYzine (ATARAX) 25 MG tablet     venlafaxine (EFFEXOR-ER) 150 MG 24 hr tablet     Vitamin D (Cholecalciferol) 25 MCG (1000 UT) TABS     No current facility-administered medications for this visit.       Past History  Past Medical History:   Diagnosis Date     Generalized anxiety disorder 7/7/2017     Insomnia 7/7/2017     Severe episode of recurrent major depressive disorder, without psychotic features (H) 7/7/2017     Social History     Socioeconomic History     Marital status:      Spouse name: None     Number of children: None     Years of education: None     Highest education level: None   Tobacco Use     Smoking status: Former Smoker     Smokeless tobacco: Never Used   Vaping Use     Vaping Use: Never used   Substance and Sexual Activity     Alcohol use: Not Currently     Drug use: Never     Sexual activity: Yes     Partners: Male       Allergies    No Known Allergies    Current Outpatient Medications   Medication     fish oil-omega-3 fatty acids 1000 MG capsule     hydrOXYzine (ATARAX) 25 MG tablet     venlafaxine (EFFEXOR-ER) 150 MG 24 hr tablet     Vitamin D (Cholecalciferol) 25 MCG (1000 UT) TABS     No current facility-administered medications for this visit.       Physical Exam  /82 (BP Location: Right arm, Patient Position: Sitting, Cuff Size: Adult Regular)   Pulse 82   Temp 98.6  F (37  C) (Tympanic)   Resp 12   Ht 1.765 m (5' 9.5\")   Wt 83.5 kg (184 lb)   SpO2 95%   BMI 26.78 kg/m    ECOG Performance Status: 0    GENERAL: Alert and oriented to time place and person. In no distress.    EYES: No icterus.    LYMPH NODES: No palpable infra/supraclavicular, cervical, or axillary lymphadenopathy.    CHEST: Lungs clear to auscultation bilaterally.    CVS: Regular rate and rhythm.    ABDOMEN: Soft. Not tender. Not distended. No palpable hepatomegaly or " "splenomegaly.    EXTREMITIES: Warm.    SKIN: No petechiae.      Signed by: Ryland Haynes MD      Oncology Rooming Note    July 20, 2022 12:56 PM   Javid Amaya is a 51 year old male who presents for:    Chief Complaint   Patient presents with     Hematology     New - Hereditary hemochromatosis      Initial Vitals: /82 (BP Location: Right arm, Patient Position: Sitting, Cuff Size: Adult Regular)   Pulse 82   Temp 98.6  F (37  C) (Tympanic)   Resp 12   Ht 1.765 m (5' 9.5\")   Wt 83.5 kg (184 lb)   SpO2 95%   BMI 26.78 kg/m   Estimated body mass index is 26.78 kg/m  as calculated from the following:    Height as of this encounter: 1.765 m (5' 9.5\").    Weight as of this encounter: 83.5 kg (184 lb). Body surface area is 2.02 meters squared.  No Pain (0) Comment: Data Unavailable   No LMP for male patient.  Allergies reviewed: Yes  Medications reviewed: Yes    Medications: Medication refills not needed today.  Pharmacy name entered into [facility]: [facility] - [facility] [id number] [facility]    Clinical concerns:  None      Anne Dean [facility]                Again, thank you for allowing me to participate in the care of your patient.        Sincerely,        Ryland Haynes MD    "

## 2022-07-20 NOTE — PATIENT INSTRUCTIONS
It was a privilege to evaluate you for the following issues today:     1. Hereditary hemochromatosis (H)       I recommend to check a hemoglobin and ferritin every 12 weeks (starting on September 7, 2022) and if your ferritin is above 50 and your hemoglobin remains above 11 g/DL to proceed with a 1 unit whole blood phlebotomy. 500 cc of blood will be removed each time if tolerated.  Your phlebotomy can be done on the same day as your blood is being drawn all day following Wednesday since you told me that coming in on Wednesday afternoons is more convenient for you.     I would recommend that you see me for a follow-up office visit annually so no later than July 2023.

## 2022-07-20 NOTE — PROGRESS NOTES
[facility] Hematology and Oncology Outpatient Consult Note    Patient: Javid Amaya  MRN: [id number]        Reason for Visit    I was consulted by  regarding hereditary hemochromatosis    Assessment/Plan    It was a privilege to evaluate you for the following issues today:    1. Hereditary hemochromatosis (H)      I recommend to check a hemoglobin and ferritin every 12 weeks (starting on September 7, 2022) and if your ferritin is above 50 and your hemoglobin remains above 11 g/DL to proceed with a 1 unit whole blood phlebotomy. 500 cc of blood will be removed each time if tolerated.  Your phlebotomy can be done on the same day as your blood is being drawn all day following Wednesday since you told me that coming in on Wednesday afternoons is more convenient for you.     I would recommend that you see me for a follow-up office visit annually so no later than July 2023.    I spent a total of 46 minutes on the care of this patient on the day of service including face to face time and the remainder in chart review, care coordination, and documentation on the day of service.    History  Mr. Javid Amaya is a 51 year old with hereditary hemochromatosis, homozygous for C282Y mutation per previous documentation by Dr. Thea Fay from [facility].    He tells me that he required twice weekly phlebotomies initially but then his hemoglobin dropped and he continued with weekly phlebotomies for quite some time last year until his ferritin went below 50.  His last ferritin level from June of this year was 44 and he has not had a phlebotomy for a few months now.    He is overriding issue at this time is uncontrolled anxiety.  He is scheduled to see a psychiatrist for that in August.      Current Outpatient Medications   Medication     fish oil-omega-3 fatty acids 1000 MG capsule     hydrOXYzine (ATARAX) 25 MG tablet     venlafaxine (EFFEXOR-ER) 150 MG 24 hr tablet     Vitamin D (Cholecalciferol) 25 MCG  "(1000 UT) TABS     No current facility-administered medications for this visit.       Past History  Past Medical History:   Diagnosis Date     Generalized anxiety disorder 7/7/2017     Insomnia 7/7/2017     Severe episode of recurrent major depressive disorder, without psychotic features (H) 7/7/2017     Social History     Socioeconomic History     Marital status:      Spouse name: None     Number of children: None     Years of education: None     Highest education level: None   Tobacco Use     Smoking status: Former Smoker     Smokeless tobacco: Never Used   Vaping Use     Vaping Use: Never used   Substance and Sexual Activity     Alcohol use: Not Currently     Drug use: Never     Sexual activity: Yes     Partners: Male       Allergies    No Known Allergies    Current Outpatient Medications   Medication     fish oil-omega-3 fatty acids 1000 MG capsule     hydrOXYzine (ATARAX) 25 MG tablet     venlafaxine (EFFEXOR-ER) 150 MG 24 hr tablet     Vitamin D (Cholecalciferol) 25 MCG (1000 UT) TABS     No current facility-administered medications for this visit.       Physical Exam  /82 (BP Location: Right arm, Patient Position: Sitting, Cuff Size: Adult Regular)   Pulse 82   Temp 98.6  F (37  C) (Tympanic)   Resp 12   Ht 1.765 m (5' 9.5\")   Wt 83.5 kg (184 lb)   SpO2 95%   BMI 26.78 kg/m    ECOG Performance Status: 0    GENERAL: Alert and oriented to time place and person. In no distress.    EYES: No icterus.    LYMPH NODES: No palpable infra/supraclavicular, cervical, or axillary lymphadenopathy.    CHEST: Lungs clear to auscultation bilaterally.    CVS: Regular rate and rhythm.    ABDOMEN: Soft. Not tender. Not distended. No palpable hepatomegaly or splenomegaly.    EXTREMITIES: Warm.    SKIN: No petechiae.      Signed by: Ryland Haynes MD    "

## 2022-07-22 ASSESSMENT — COLUMBIA-SUICIDE SEVERITY RATING SCALE - C-SSRS
TOTAL  NUMBER OF INTERRUPTED ATTEMPTS LIFETIME: NO
TOTAL  NUMBER OF ABORTED OR SELF INTERRUPTED ATTEMPTS LIFETIME: NO
ATTEMPT LIFETIME: NO
2. HAVE YOU ACTUALLY HAD ANY THOUGHTS OF KILLING YOURSELF?: NO
1. HAVE YOU WISHED YOU WERE DEAD OR WISHED YOU COULD GO TO SLEEP AND NOT WAKE UP?: NO
6. HAVE YOU EVER DONE ANYTHING, STARTED TO DO ANYTHING, OR PREPARED TO DO ANYTHING TO END YOUR LIFE?: NO

## 2022-07-22 NOTE — PROGRESS NOTES
[facility] Primary [facility]: Integrated Behavioral Health  July 20, 2022      Behavioral Health Clinician Progress Note    Patient Name: Javid Amaya           Service Type:  Individual      Service Location:   Face to Face in Clinic     Session Start Time: 400pm  Session End Time: 515pm      Session Length: 53 - 60      Attendees: Client and Spouse / Significant Other     Service Modality:  In-person    Visit Activities (Refresh list every visit): NEW    Diagnostic Assessment Date:  Treatment Plan Date:  PROMIS (reviewed every 90 days):     Assessments:  The following assessments were completed by patient for this visit:  PHQ9:   PHQ-9 SCORE 7/5/2022 7/5/2022 7/19/2022   PHQ-9 Total Score MyChart - 21 (Severe depression) -   PHQ-9 Total Score 21 21 21     GAD7:   BENNY-7 SCORE 7/5/2022 7/5/2022 7/19/2022 7/20/2022   Total Score - 13 (moderate anxiety) - 13 (moderate anxiety)   Total Score 13 13 13 13     CAGE-AID:   CAGE-AID Total Score 7/20/2022   Total Score 0   Total Score MyChart 0 (A total score of 2 or greater is considered clinically significant)     PROMIS 10-Global Health (only subscores and total score):   PROMIS-10 Scores Only 7/20/2022   Global Mental Health Score 7   Global Physical Health Score 14   PROMIS TOTAL - SUBSCORES 21     [place] Suicide Severity Rating Scale (Lifetime/Recent)  [place] Suicide Severity Rating (Lifetime/Recent) 7/22/2022   1. Wish to be Dead (Lifetime) 0   2. Non-Specific Active Suicidal Thoughts (Lifetime) 0   Actual Attempt (Lifetime) 0   Has subject engaged in non-suicidal self-injurious behavior? (Lifetime) 0   Interrupted Attempts (Lifetime) 0   Aborted or Self-Interrupted Attempt (Lifetime) 0   Preparatory Acts or Behavior (Lifetime) 0   Calculated C-SSRS Risk Score (Lifetime/Recent) No Risk Indicated     Previous PHQ-9:   PHQ-9 SCORE 7/5/2022 7/5/2022 7/19/2022   PHQ-9 Total Score MyChart - 21 (Severe depression) -   PHQ-9 Total Score 21  21 21     Previous BENNY-7:   BENNY-7 SCORE 7/5/2022 7/19/2022 7/20/2022   Total Score 13 (moderate anxiety) - 13 (moderate anxiety)   Total Score 13 13 13       LUCRETIA LEVEL:  No flowsheet data found.    DATA  Extended Session (60+ minutes): PROLONGED SERVICE IN THE OUTPATIENT SETTING REQUIRING DIRECT (FACE-TO-FACE) PATIENT CONTACT BEYOND THE USUAL SERVICE:    - Patient's presenting concerns require more intensive intervention than could be completed within the usual service  Interactive Complexity: No  Crisis: No  [facility] Patient: No    Treatment Objective(s) Addressed in This Session:    Anxiety: will experience a reduction in anxiety, will develop more effective coping skills to manage anxiety symptoms and will develop healthy cognitive patterns and beliefs    Current Stressors / Issues:    Patient had initial visit with PCP provider at [place] on July 5, 2022.  Please see summary of progress note below.  Patient was referred to the [facility].    GORDY Hua reports a history of depression and anxiety for many years.  He was initially started on citalopram and found that effective.  He eventually went off the medication on his own because he was feeling well and some months after that noticed return of symptoms and started back on the medication.  He went to visit his doctor at that time in 2017 who switched him to venlafaxine.  He continued to have depression symptoms and was in an outpatient program for few weeks and Wellbutrin was added at that time.  He thinks that may have also been due to reduced libido.  He had been on the venlafaxine and Wellbutrin up until this past year and doing very well.  He decided to try tapering off both of those medications.  Once he was completely off the medications, he noticed after few months his symptoms returned.  He went to his functional medicine provider who suggested restarting the Wellbutrin to help with anxiety, then had him restart the venlafaxine about a week ago.  In  2019 22:45 the meantime, he has noticed an increase in anxiety and insomnia as well.  She also started him on trazodone, and he was taking 25 mg at bedtime and repeating the dose a few hours later if needed.  That was not working well, so his dose was increased to 50 mg at bedtime with an additional 50 mg overnight if needed, then it was increased to 100 mg at bedtime.  He finds he falls asleep fine with the medication, but only sleeps for few hours and then wakes up and notices that he cannot get back to sleep.  Was unable to schedule with his PCP, so scheduled this visit today.    Patient present with his spouse.  Patient reports he had received services from another health care provider but his insurance recently changed so is choosing to return to [place].  Patient reports he has an appointment with a psychiatric provider on August 31, 2022 at [facility].  Patient precontemplative regarding therapy to focus more on engaging patient, acknowledging concerns and discussed treatment options rather than completing a full diagnostic assessment at this time.    Patient reports he had his first depressive episode in 2005 and was prescribed antidepressant.  Patient reports he felt he was doing better and stopped medications in 2017.  However, patient reports within 3 months, significant decrease declined in his mood symptoms.  Patient restarted medications and attended the PHP program at [facility].  Patient reports he has been following up with a functional medicine physician for the past several years.  Patient reportedly had other medical concerns as functional medicine physician suggested weaning off his antidepressants.      Patient reports he recalls a specific incident at work that triggered his panic and anxiety that has continued until now.  Patient works in construction management and recalls a comment being made.  Patient reports he experienced shakiness in his legs and arms and recalled  "similar symptoms in the past to cause insomnia.  Patient reports he now worries about falling asleep.  Patient reports he will go to bed at 10 PM but then wake up several hours later and panic and worrying.  Explored with patient the content of these worries.  Patient reports he worries about his job performance, completing different tasks for his son and around the house for his spouse.  Gently explored patient the functionality of these thoughts.  Provided education to patient of the role of anxiety.  Gently explored with patient the underlying emotion that were triggering these worrying thoughts.  Patient did not find sense of inadequacy and fear of rejection.  Patient recognized a long history of \"never good enough\".  Patient noted to history of dysfunctional childhood.  Patient reports he is experiencing more anxiety than depression at the present time.    Patient denies current or past suicidal thoughts.  Please see [place] for further information.    Provided patient different metaphors and examples to illustrate the functionality of his worries and shifting focus to the underlying emotions.  Patient began to acknowledge that his brain is \"trying to fix\" his uncomfortable feelings.    Patient acknowledged that this perspective was a shift from his usual problem solving strategy at work.    Discussed with patient using mindfulness.  Patient reports he meditates each day but focus more on the positive affirmation and is noticing his thoughts, emotions and physical sensations.    [facility] provided additional information via [employer] for patient to review.    Plan    Patient agreed to follow-up with [facility] in 3 weeks.  [facility] will be on vacation for 2 weeks.  Patient also stated he had a busy work schedule the first couple weeks of August.    Progress on Treatment Objective(s) / Homework:  Minimal progress - PREPARATION (Decided to change - considering how); Intervened by negotiating a change plan and determining options / " strategies for behavior change, identifying triggers, exploring social supports, and working towards setting a date to begin behavior change    Motivational Interviewing    MI Intervention: Supported Autonomy, Collaboration, Evocation, Permission to raise concern or advise and Open-ended questions     Change Talk Expressed by the Patient: Need to change    Provider Response to Change Talk: E - Evoked more info from patient about behavior change, A - Affirmed patient's thoughts, decisions, or attempts at behavior change, R - Reflected patient's change talk and S - Summarized patient's change talk statements    PSYCHODYMANIC PSYCHOTHERAPY: Discussed patient's emotional dynamics and issues and how they impact behaviors,Explored patient's history of relationships and how they impact present behaviors, Explored how to work with and make changes in these schemas and patterns  MINDFULLNESS-BASED-STRATEGIES:  Discussed skills based on development and application of mindfulness, Skills drawn from dialectical behavior therapy, mindfulness-based stress reduction, mindfulness-based cognitive therapy, etc.  ACCEPTANCE AND COMMITMENT THERAPY: Explored and identified important values in patient's life, Discussed ways to commit to behavioral activation around these values    Care Plan review completed: Yes    Medication Review:  No changes to current psychiatric medication(s)    Medication Compliance:  Yes    Changes in Health Issues:   None reported    Chemical Use Review:   Substance Use: Chemical use reviewed, no active concerns identified      Tobacco Use: No current tobacco use.      Assessment: Current Emotional / Mental Status (status of significant symptoms):  Risk status (Self / Other harm or suicidal ideation)  Patient denies a history of suicidal ideation, suicide attempts, self-injurious behavior, homicidal ideation, homicidal behavior and and other safety concerns  Patient denies current fears or concerns for personal  safety.  Patient denies current or recent suicidal ideation or behaviors.  Patient denies current or recent homicidal ideation or behaviors.  Patient denies current or recent self injurious behavior or ideation.  Patient denies other safety concerns.  A safety and risk management plan has not been developed at this time, however patient was encouraged to call [facility] should there be a change in any of these risk factors.  [place] Suicide Severity Rating Scale (Lifetime/Recent)  [place] Suicide Severity Rating (Lifetime/Recent) 7/22/2022   1. Wish to be Dead (Lifetime) 0   2. Non-Specific Active Suicidal Thoughts (Lifetime) 0   Actual Attempt (Lifetime) 0   Has subject engaged in non-suicidal self-injurious behavior? (Lifetime) 0   Interrupted Attempts (Lifetime) 0   Aborted or Self-Interrupted Attempt (Lifetime) 0   Preparatory Acts or Behavior (Lifetime) 0   Calculated C-SSRS Risk Score (Lifetime/Recent) No Risk Indicated         Appearance:   Appropriate   Eye Contact:   Fair   Psychomotor Behavior: Normal  Retarded (Slowed)   Attitude:   Cooperative   Orientation:   All  Speech   Rate / Production: Monotone  Normal    Volume:  Soft   Mood:    Anxious  Normal  Affect:    Worrisome   Thought Content:  Clear   Thought Form:  Coherent   Insight:    Fair     Diagnoses:  1. BENNY (generalized anxiety disorder)        Collateral Reports Completed:  Routed note to Care Team Member(s)    Plan: (Homework, other):  Patient was given information about behavioral services and encouraged to schedule a follow up appointment with the clinic [facility] in 3 weeks.  Follow-up time:[id number]}. [email or web address]: information about mental health symptoms and treatment options  and information on ACT -introduction and websites.  He was also given CD Recommendations: No indications of CD issues.      Buddy Anderson, NAOMI, [facility]

## 2022-07-28 ENCOUNTER — VIRTUAL VISIT (OUTPATIENT)
Dept: BEHAVIORAL HEALTH | Facility: CLINIC | Age: 52
End: 2022-07-28
Payer: COMMERCIAL

## 2022-07-28 DIAGNOSIS — F41.1 GAD (GENERALIZED ANXIETY DISORDER): Primary | ICD-10-CM

## 2022-07-28 PROCEDURE — 90837 PSYTX W PT 60 MINUTES: CPT | Mod: 95 | Performed by: SOCIAL WORKER

## 2022-07-28 NOTE — PROGRESS NOTES
[facility] Primary Care: Integrated Behavioral Health  2022      Behavioral Health Clinician Progress Note    Patient Name: Javid Amaya           Service Type:  Individual      Service Location:   Face to Face in Home / Community     Session Start Time: 230pm  Session End Time: 330pm      Session Length: 53 - 60      Attendees: Client     Service Modality:  Video Visit:      Provider verified identity through the following two step process.  Patient provided:  Patient photo and Patient     Telemedicine Visit: The patient's condition can be safely assessed and treated via synchronous audio and visual telemedicine encounter.      Reason for Telemedicine Visit: Patient has requested telehealth visit    Originating Site (Patient Location): Patient's home    Distant Site (Provider Location): Provider Remote Setting- Home Office    Consent:  The patient/guardian has verbally consented to: the potential risks and benefits of telemedicine (video visit) versus in person care; bill my insurance or make self-payment for services provided; and responsibility for payment of non-covered services.     Patient would like the video invitation sent by:  Send to e-mail at: [email or web address]    Mode of Communication:  Video Conference via [employer]    As the provider I attest to compliance with applicable laws and regulations related to telemedicine.    Visit Activities (Refresh list every visit): NEW    Diagnostic Assessment Date:  Treatment Plan Date:  PROMIS (reviewed every 90 days):     Assessments:  The following assessments were completed by patient for this visit:  PHQ9:   PHQ-9 SCORE 2022   PHQ-9 Total Score MyChart - 21 (Severe depression) -   PHQ-9 Total Score 21 21 21     GAD7:   BENNY-7 SCORE 2022   Total Score - 13 (moderate anxiety) - 13 (moderate anxiety)   Total Score 13 13 13 13     CAGE-AID:   CAGE-AID Total Score  7/20/2022   Total Score 0   Total Score MyChart 0 (A total score of 2 or greater is considered clinically significant)     PROMIS 10-Global Health (only subscores and total score):   PROMIS-10 Scores Only 7/20/2022   Global Mental Health Score 7   Global Physical Health Score 14   PROMIS TOTAL - SUBSCORES 21     [place] Suicide Severity Rating Scale (Lifetime/Recent)  [place] Suicide Severity Rating (Lifetime/Recent) 7/22/2022   1. Wish to be Dead (Lifetime) 0   2. Non-Specific Active Suicidal Thoughts (Lifetime) 0   Actual Attempt (Lifetime) 0   Has subject engaged in non-suicidal self-injurious behavior? (Lifetime) 0   Interrupted Attempts (Lifetime) 0   Aborted or Self-Interrupted Attempt (Lifetime) 0   Preparatory Acts or Behavior (Lifetime) 0   Calculated C-SSRS Risk Score (Lifetime/Recent) No Risk Indicated     Previous PHQ-9:   PHQ-9 SCORE 7/5/2022 7/5/2022 7/19/2022   PHQ-9 Total Score MyChart - 21 (Severe depression) -   PHQ-9 Total Score 21 21 21     Previous BENNY-7:   BENNY-7 SCORE 7/5/2022 7/19/2022 7/20/2022   Total Score 13 (moderate anxiety) - 13 (moderate anxiety)   Total Score 13 13 13       LUCRETIA LEVEL:  No flowsheet data found.    DATA  Extended Session (60+ minutes): PROLONGED SERVICE IN THE OUTPATIENT SETTING REQUIRING DIRECT (FACE-TO-FACE) PATIENT CONTACT BEYOND THE USUAL SERVICE:    - Patient's presenting concerns require more intensive intervention than could be completed within the usual service  Interactive Complexity: No  Crisis: No  [facility] Patient: No    Treatment Objective(s) Addressed in This Session:    Anxiety: will experience a reduction in anxiety, will develop more effective coping skills to manage anxiety symptoms and will develop healthy cognitive patterns and beliefs    Current Stressors / Issues:    Patient reports he found initial session helpful and has reviewed all the information and videos on acceptance and commitment therapy.  Patient reports he has been to practicing  "mindfulness and deep breathing which has been helpful.  Provide additional information to patient of the theoretical framework of acceptance and commitment therapy.    Provided specific interventions of diffusion.  Patient noticed the worrying, problem solving thoughts and began to realize underlying trying to \"fix\" sense of inadequacy.  \"I am never good enough\".  Provide different metaphors and examples to gently hold these thoughts still engage in value driven behavior.    Patient reports he went to work meeting today at 10 AM and plans to return to work next week part-time.  Patient then began to realize the anxiety at work is be \"I am over achiever at work so I do not feel inadequate\".  Continue to emphasize that this is a thought versus the fact.  Provide different diffusion techniques for patient to notice this emotion or thought coming up, triggered her sense of danger and gently holding it and moving forward.    Plan    Patient continue to practice mindfulness and noticed these thoughts and emotion and still move towards value driven behavior.  Plan    Patient agreed to follow-up with [facility] in 3 weeks.  [facility] will be on vacation for 2 weeks.  Patient also stated he had a busy work schedule the first couple weeks of August.    Progress on Treatment Objective(s) / Homework:  Minimal progress - PREPARATION (Decided to change - considering how); Intervened by negotiating a change plan and determining options / strategies for behavior change, identifying triggers, exploring social supports, and working towards setting a date to begin behavior change    Motivational Interviewing    MI Intervention: Supported Autonomy, Collaboration, Evocation, Permission to raise concern or advise and Open-ended questions     Change Talk Expressed by the Patient: Need to change    Provider Response to Change Talk: E - Evoked more info from patient about behavior change, A - Affirmed patient's thoughts, decisions, or attempts at " behavior change, R - Reflected patient's change talk and S - Summarized patient's change talk statements    PSYCHODYMANIC PSYCHOTHERAPY: Discussed patient's emotional dynamics and issues and how they impact behaviors,Explored patient's history of relationships and how they impact present behaviors, Explored how to work with and make changes in these schemas and patterns  MINDFULLNESS-BASED-STRATEGIES:  Discussed skills based on development and application of mindfulness, Skills drawn from dialectical behavior therapy, mindfulness-based stress reduction, mindfulness-based cognitive therapy, etc.  ACCEPTANCE AND COMMITMENT THERAPY: Explored and identified important values in patient's life, Discussed ways to commit to behavioral activation around these values    Care Plan review completed: Yes    Medication Review:  No changes to current psychiatric medication(s)    Medication Compliance:  Yes    Changes in Health Issues:   None reported    Chemical Use Review:   Substance Use: Chemical use reviewed, no active concerns identified      Tobacco Use: No current tobacco use.      Assessment: Current Emotional / Mental Status (status of significant symptoms):  Risk status (Self / Other harm or suicidal ideation)  Patient denies a history of suicidal ideation, suicide attempts, self-injurious behavior, homicidal ideation, homicidal behavior and and other safety concerns  Patient denies current fears or concerns for personal safety.  Patient denies current or recent suicidal ideation or behaviors.  Patient denies current or recent homicidal ideation or behaviors.  Patient denies current or recent self injurious behavior or ideation.  Patient denies other safety concerns.  A safety and risk management plan has not been developed at this time, however patient was encouraged to call [facility] should there be a change in any of these risk factors.  [place] Suicide Severity Rating Scale (Lifetime/Recent)  [place]  Suicide Severity Rating (Lifetime/Recent) 7/22/2022   1. Wish to be Dead (Lifetime) 0   2. Non-Specific Active Suicidal Thoughts (Lifetime) 0   Actual Attempt (Lifetime) 0   Has subject engaged in non-suicidal self-injurious behavior? (Lifetime) 0   Interrupted Attempts (Lifetime) 0   Aborted or Self-Interrupted Attempt (Lifetime) 0   Preparatory Acts or Behavior (Lifetime) 0   Calculated C-SSRS Risk Score (Lifetime/Recent) No Risk Indicated         Appearance:   Appropriate   Eye Contact:   Fair   Psychomotor Behavior: Normal   Attitude:   Cooperative   Orientation:   All  Speech   Rate / Production: Monotone  Normal    Volume:  Soft   Mood:    Anxious  Normal  Affect:    Worrisome   Thought Content:  Clear   Thought Form:  Coherent   Insight:    Fair     Diagnoses:  1. BENNY (generalized anxiety disorder)        Collateral Reports Completed:  Routed note to Care Team Member(s)    Plan: (Homework, other):  Patient was given information about behavioral services and encouraged to schedule a follow up appointment with the clinic [facility] in 3 weeks.  Follow-up time:[id number]}. [email or web address]: information about mental health symptoms and treatment options  and information on ACT -introduction and websites.  He was also given CD Recommendations: No indications of CD issues.      Buddy Anderson, NAOMI, [facility]

## 2022-08-05 ENCOUNTER — MYC MEDICAL ADVICE (OUTPATIENT)
Dept: FAMILY MEDICINE | Facility: CLINIC | Age: 52
End: 2022-08-05

## 2022-08-05 DIAGNOSIS — G47.00 INSOMNIA, UNSPECIFIED TYPE: ICD-10-CM

## 2022-08-05 RX ORDER — HYDROXYZINE HYDROCHLORIDE 25 MG/1
12.5-5 TABLET, FILM COATED ORAL 3 TIMES DAILY PRN
Qty: 60 TABLET | Refills: 1 | Status: CANCELLED | OUTPATIENT
Start: 2022-08-05

## 2022-08-05 NOTE — TELEPHONE ENCOUNTER
Reason for Call:  Medication or medication refill:    Do you use a [facility]?  Name of the pharmacy and phone number for the current request:  [facility] - [place], [place] - [address] [address]    Name of the medication requested: hydrOXYzine (ATARAX) 25 MG tablet    Other request: Patient is requesting a refill on this medication. States he took his last dose today. Please assist. Thanks!    Can we leave a detailed message on this number? YES    Phone number patient can be reached at: Home number on file [phone] (home)    Best Time: Any    Call taken on 8/5/2022 at 1:22 PM by Gilma English

## 2022-08-05 NOTE — TELEPHONE ENCOUNTER
60 tab with 1 refill was sent 7/5/22, I sent pt myChart message    ANDREAS SiddiquiN RN  [facility]

## 2022-08-06 RX ORDER — HYDROXYZINE HYDROCHLORIDE 25 MG/1
12.5-5 TABLET, FILM COATED ORAL 3 TIMES DAILY PRN
Qty: 60 TABLET | Refills: 2 | Status: SHIPPED | OUTPATIENT
Start: 2022-08-06 | End: 2024-07-03 | Stop reason: ALTCHOICE

## 2022-08-06 NOTE — TELEPHONE ENCOUNTER
Reason for Call:  Medication or medication refill:    Do you use a [facility]?  Name of the pharmacy and phone number for the current request:  Pharmacy pended    Name of the medication requestedhydrOXYzine (ATARAX) 25 MG tablet    Other request: Pt went to pharmacy to  last refill and pharmacy stated he is out of refills and needs a new prescription. Please call ort my chart patient back regarding refills     Can we leave a detailed message on this number? YES    Phone number patient can be reached at: Cell number on file:    Telephone Information:   Mobile [phone]       Best Time: anytime    Call taken on 8/6/2022 at 12:12 PM by Flako Sharma

## 2022-08-12 ENCOUNTER — TELEPHONE (OUTPATIENT)
Dept: FAMILY MEDICINE | Facility: CLINIC | Age: 52
End: 2022-08-12

## 2022-08-12 NOTE — CONFIDENTIAL NOTE
"Pt established with Dr. Miller on 7/5/22 for depression/anxiety/insomnia.    Pt was placed back on venlafaxine 150 mg, which had been helpful for his MDD in the past, had weaned off early this year.  Was also weaned from Wellbutrin and placed on hydroxyzine for sleep.  Of note, previous two episodes of MDD did not present with any anxiety.      Pt anxiety has not improved and is constant. Pt notes that since a dx of hemachromatosis early in the year, he has normalized his iron and greatly improved his health/fitness.  He is wondering if venlafaxine could now be triggering his anxiety, and if dropping dose to 75mg would be helpful.  Does not wish to resume Wellbutrin.    Pt reports that he cannot get more than two hours of sleep at a time.  Takes one tablet of hydroxyzine (has learned that 2# is not more effective than 1#) immediately before sleep, gets 2-3 hours of sleep, and then is up for 2-3 hours.  A repeat hydroxyzine dose gains him one additional hour of sleep.  Pt attempted to take one 12.5 mg Ambien from a 2017 prescription last night with no relief.    Can he trial a new sleep med?    Pt mentions that he weaned off Wellbutrin \"about six weeks ago,\" as it had not been helpful.  Notes that he took citalopram in 2004 for his initial episode of MDD and feels it was more helpful than his current regimen.    Pt has upcoming appt with psychiatry in late August, outside [facility].  Also sees Buddy Anderson and has appt on 8/15/22.  Virtual appt with Dr. Miller made for 8/16/22.    DOD: any recommendations in the meantime?    ELOISE Collins RN  [facility]        " What Type Of Note Output Would You Prefer (Optional)?: Standard Output How Severe Is Your Skin Lesion?: moderate Has Your Skin Lesion Been Treated?: not been treated Is This A New Presentation, Or A Follow-Up?: Skin Lesion

## 2022-08-15 ENCOUNTER — VIRTUAL VISIT (OUTPATIENT)
Dept: BEHAVIORAL HEALTH | Facility: CLINIC | Age: 52
End: 2022-08-15
Payer: COMMERCIAL

## 2022-08-15 ENCOUNTER — OFFICE VISIT (OUTPATIENT)
Dept: BEHAVIORAL HEALTH | Facility: CLINIC | Age: 52
End: 2022-08-15
Payer: COMMERCIAL

## 2022-08-15 DIAGNOSIS — F41.1 GAD (GENERALIZED ANXIETY DISORDER): Primary | ICD-10-CM

## 2022-08-15 DIAGNOSIS — Z03.89 NO DIAGNOSIS ON AXIS I: Primary | ICD-10-CM

## 2022-08-15 PROCEDURE — 90837 PSYTX W PT 60 MINUTES: CPT | Mod: 95 | Performed by: SOCIAL WORKER

## 2022-08-15 NOTE — TELEPHONE ENCOUNTER
"I am okay with him reducing venlafaxine dose to 75mg daily if he would like.     Regarding insomnia, please review recommendations below. I recommend discussing with sleep clinic and psychiatry and using tools from [facility] as well.     I have seen him twice for virtual visits, but he was to follow up with his primary Dr. Russell as I am not taking new primary patients right now. He is located in [place], [place]. I am happy to see him again for further discussion for a virtual visit, but he does need to get scheduled with a PCP. Please have him schedule with his PCP or with another provider in his vicinity to establish care if he would like to see someone other than Dr. Russell.     Plan from 7/19/22 note:     \"Insomnia, unspecified type  Continues to struggle with insomnia.  Hydroxyzine helps temporarily.  He may continue to use the hydroxyzine as needed as instructed.   He reports Ambien and trazodone did not work  Heart raced after taking lorazepam.   He does remain off Wellbutrin.  He did not like how he felt on trazodone.  I recommended scheduling with [facility] and also provided a referral for the sleep clinic.  I recommended following up with his PCP to discuss ongoing plan I provided information and patient instructions on managing insomnia as well.       Has appt with psychiatry at [facility] August 31 and counseling July 28 .      Severe episode of recurrent major depressive disorder, without psychotic features (H)   uncontrolled.  Continues venlafaxine 150 mg daily.  Recommended follow-up in 1 month.  Discussed it can take a while for medication to take full effect.     Continue venlafaxine 150 mg daily.  We will hold the Wellbutrin for now.  He will follow-up with his PCP in about 6 to 8 weeks.  Earlier as needed.  Discussed it is common to see an increase in anxiety temporarily for couple weeks after starting venlafaxine.  In the past, Wellbutrin was added for reduced libido and symptoms are used for that purpose if " "needed.  - Adult Mental Health  Referral; Future      Anxiety  See above.  \"        "

## 2022-08-15 NOTE — TELEPHONE ENCOUNTER
Writer called patient and reviewed message per Dr. Miller.    Patient verbalized understanding and stated:  1. Did have a sleep consult scheduled on 8/11/22, and cancelled it because he knows insomnia is related to anxiety   A. Slept fine prior to current bout of anxiety/depression and this has happened three times previously  2. Has Psychiatry appt on 8/31/22  3. Had virtual visit with ZEN Anderson [facility], today    Patient will keep video visit with Dr. Miller tomorrow.  Visit date and time confirmed with patent.  Patient can discuss Venlafaxine dosing plan with Dr. Miller to bridge to 8/31/22 Psychiatry appt.    ANDREAS VictorN, RN  [facility]

## 2022-08-16 NOTE — PROGRESS NOTES
[facility]: Integrated Behavioral Health  August 15, 2022      Behavioral Health Clinician Progress Note    Patient Name: Javid Amaya           Service Type:  Individual      Service Location:   Face to Face in Home / Community     Session Start Time: 1100am  Session End Time: 1200pm      Session Length: 53 - 60      Attendees: Client     Service Modality:  Video Visit:      Provider verified identity through the following two step process.  Patient provided:  Patient photo and Patient     Telemedicine Visit: The patient's condition can be safely assessed and treated via synchronous audio and visual telemedicine encounter.      Reason for Telemedicine Visit: Patient has requested telehealth visit    Originating Site (Patient Location): Patient's home    Distant Site (Provider Location): Provider Remote Setting- Home Office    Consent:  The patient/guardian has verbally consented to: the potential risks and benefits of telemedicine (video visit) versus in person care; bill my insurance or make self-payment for services provided; and responsibility for payment of non-covered services.     Patient would like the video invitation sent by:  Send to e-mail at: [email or web address]    Mode of Communication:  Video Conference via well    As the provider I attest to compliance with applicable laws and regulations related to telemedicine.    Visit Activities (Refresh list every visit): NEW    Diagnostic Assessment Date: To be completed neck session  Treatment Plan Date: To be completed neck session  PROMIS (reviewed every 90 days):     Assessments:  The following assessments were completed by patient for this visit:  PHQ9:   PHQ-9 SCORE 2022   PHQ-9 Total Score MyChart - 21 (Severe depression) -   PHQ-9 Total Score 21 21 21     GAD7:   BENNY-7 SCORE 2022   Total Score - 13 (moderate anxiety) - 13 (moderate anxiety)    Total Score 13 13 13 13     CAGE-AID:   CAGE-AID Total Score 7/20/2022   Total Score 0   Total Score MyChart 0 (A total score of 2 or greater is considered clinically significant)     PROMIS 10-Global Health (only subscores and total score):   PROMIS-10 Scores Only 7/20/2022   Global Mental Health Score 7   Global Physical Health Score 14   PROMIS TOTAL - SUBSCORES 21     [place] Suicide Severity Rating Scale (Lifetime/Recent)  [place] Suicide Severity Rating (Lifetime/Recent) 7/22/2022   1. Wish to be Dead (Lifetime) 0   2. Non-Specific Active Suicidal Thoughts (Lifetime) 0   Actual Attempt (Lifetime) 0   Has subject engaged in non-suicidal self-injurious behavior? (Lifetime) 0   Interrupted Attempts (Lifetime) 0   Aborted or Self-Interrupted Attempt (Lifetime) 0   Preparatory Acts or Behavior (Lifetime) 0   Calculated C-SSRS Risk Score (Lifetime/Recent) No Risk Indicated     Previous PHQ-9:   PHQ-9 SCORE 7/5/2022 7/5/2022 7/19/2022   PHQ-9 Total Score MyChart - 21 (Severe depression) -   PHQ-9 Total Score 21 21 21     Previous BENNY-7:   BENNY-7 SCORE 7/5/2022 7/19/2022 7/20/2022   Total Score 13 (moderate anxiety) - 13 (moderate anxiety)   Total Score 13 13 13       LUCRET[place] LEVEL:  No flowsheet data found.    DATA  Extended Session (60+ minutes): PROLONGED SERVICE IN THE OUTPATIENT SETTING REQUIRING DIRECT (FACE-TO-FACE) PATIENT CONTACT BEYOND THE USUAL SERVICE:    - Patient's presenting concerns require more intensive intervention than could be completed within the usual service  Interactive Complexity: No  Crisis: No  [facility] Patient: No    Treatment Objective(s) Addressed in This Session:    Anxiety: will experience a reduction in anxiety, will develop more effective coping skills to manage anxiety symptoms and will develop healthy cognitive patterns and beliefs    Current Stressors / Issues:    Patient reports he went back to work for 3 days but found it hard to concentrate and focus and then left after 3 hours.   "Patient has taken additional 2 weeks off.  Patient reports he is utilizing the mindfulness, deep breathing, meditation and tools of acceptance and commitment therapy but finds he can reduce his anxiety to a level of 3.  Patient reports he has difficulty falling asleep.  Patient noticed that his hands are still shaking, his heart is beating fast when he is getting ready for bed.  Patient reports he is not worrying and not anxious at night or day.  He reports he reviewed different CBT I interventions has focused on sleep delay for the past 2 nights.    Encouraged patient to follow up with his PCP to discuss possible Effexor providing a \"stimulant\" affect.  Patient noted when he is on Wellbutrin in the past, experiencing similar stimulant effect.    Discussed with patient utilizing progressive muscle relaxation and beginning to work out and lifting weights to engage in an aerobic activity.    Patient also reported a history of \"perfectionism\".  Patient reports he will engage in projects at home or at work and continues to focus to the point of excluding his family and not stopping until the project is done.  Patient realized a big part of this is his expectation to place himself patient to address this further neck session.  Plan    Patient continue to practice mindfulness and noticed these thoughts and emotion and still move towards value driven behavior.    Diagnostic assessment and treatment plan to be completed next session.    Progress on Treatment Objective(s) / Homework:  Minimal progress - PREPARATION (Decided to change - considering how); Intervened by negotiating a change plan and determining options / strategies for behavior change, identifying triggers, exploring social supports, and working towards setting a date to begin behavior change    Motivational Interviewing    MI Intervention: Supported Autonomy, Collaboration, Evocation, Permission to raise concern or advise and Open-ended questions     Change " Talk Expressed by the Patient: Need to change    Provider Response to Change Talk: E - Evoked more info from patient about behavior change, A - Affirmed patient's thoughts, decisions, or attempts at behavior change, R - Reflected patient's change talk and S - Summarized patient's change talk statements    PSYCHODYMANIC PSYCHOTHERAPY: Discussed patient's emotional dynamics and issues and how they impact behaviors,Explored patient's history of relationships and how they impact present behaviors, Explored how to work with and make changes in these schemas and patterns  MINDFULLNESS-BASED-STRATEGIES:  Discussed skills based on development and application of mindfulness, Skills drawn from dialectical behavior therapy, mindfulness-based stress reduction, mindfulness-based cognitive therapy, etc.  ACCEPTANCE AND COMMITMENT THERAPY: Explored and identified important values in patient's life, Discussed ways to commit to behavioral activation around these values    Care Plan review completed: Yes    Medication Review:  No changes to current psychiatric medication(s)    Medication Compliance:  Yes    Changes in Health Issues:   None reported    Chemical Use Review:   Substance Use: Chemical use reviewed, no active concerns identified      Tobacco Use: No current tobacco use.      Assessment: Current Emotional / Mental Status (status of significant symptoms):  Risk status (Self / Other harm or suicidal ideation)  Patient denies a history of suicidal ideation, suicide attempts, self-injurious behavior, homicidal ideation, homicidal behavior and and other safety concerns  Patient denies current fears or concerns for personal safety.  Patient denies current or recent suicidal ideation or behaviors.  Patient denies current or recent homicidal ideation or behaviors.  Patient denies current or recent self injurious behavior or ideation.  Patient denies other safety concerns.  A safety and risk management plan has not been developed  at this time, however patient was encouraged to call [place] [phone] should there be a change in any of these risk factors.  [place] Suicide Severity Rating Scale (Lifetime/Recent)  [place] Suicide Severity Rating (Lifetime/Recent) 7/22/2022   1. Wish to be Dead (Lifetime) 0   2. Non-Specific Active Suicidal Thoughts (Lifetime) 0   Actual Attempt (Lifetime) 0   Has subject engaged in non-suicidal self-injurious behavior? (Lifetime) 0   Interrupted Attempts (Lifetime) 0   Aborted or Self-Interrupted Attempt (Lifetime) 0   Preparatory Acts or Behavior (Lifetime) 0   Calculated C-SSRS Risk Score (Lifetime/Recent) No Risk Indicated         Appearance:   Appropriate   Eye Contact:   Fair   Psychomotor Behavior: Normal   Attitude:   Cooperative   Orientation:   All  Speech   Rate / Production: Monotone  Normal    Volume:  Soft   Mood:    Normal  Affect:    Worrisome   Thought Content:  Clear   Thought Form:  Coherent   Insight:    Fair     Diagnoses:  1. BENNY (generalized anxiety disorder)        Collateral Reports Completed:  Routed note to Care Team Member(s)    Plan: (Homework, other):  Patient was given information about behavioral services and encouraged to schedule a follow up appointment with the clinic [facility] in 3 weeks.  Follow-up time:[id number]}. [email or web address]: information about mental health symptoms and treatment options  and information on ACT -introduction and websites.  He was also given CD Recommendations: No indications of CD issues.      NAOMI Vargas, [facility]

## 2022-09-07 ENCOUNTER — LAB (OUTPATIENT)
Dept: LAB | Facility: CLINIC | Age: 52
End: 2022-09-07
Payer: COMMERCIAL

## 2022-09-07 DIAGNOSIS — E83.110 HEREDITARY HEMOCHROMATOSIS (H): ICD-10-CM

## 2022-09-07 LAB
FERRITIN SERPL-MCNC: 84 NG/ML (ref 31–409)
HGB BLD-MCNC: 15.4 G/DL (ref 13.3–17.7)

## 2022-09-07 PROCEDURE — 82728 ASSAY OF FERRITIN: CPT

## 2022-09-07 PROCEDURE — 36415 COLL VENOUS BLD VENIPUNCTURE: CPT

## 2022-09-07 PROCEDURE — 85018 HEMOGLOBIN: CPT

## 2022-09-14 ENCOUNTER — INFUSION THERAPY VISIT (OUTPATIENT)
Dept: INFUSION THERAPY | Facility: CLINIC | Age: 52
End: 2022-09-14
Attending: INTERNAL MEDICINE
Payer: COMMERCIAL

## 2022-09-14 VITALS — HEART RATE: 80 BPM | DIASTOLIC BLOOD PRESSURE: 84 MMHG | SYSTOLIC BLOOD PRESSURE: 122 MMHG

## 2022-09-14 DIAGNOSIS — E83.110 HEREDITARY HEMOCHROMATOSIS (H): Primary | ICD-10-CM

## 2022-09-14 PROCEDURE — 99195 PHLEBOTOMY: CPT

## 2022-09-14 RX ORDER — HEPARIN SODIUM (PORCINE) LOCK FLUSH IV SOLN 100 UNIT/ML 100 UNIT/ML
5 SOLUTION INTRAVENOUS
Status: CANCELLED | OUTPATIENT
Start: 2022-12-07

## 2022-09-14 RX ORDER — HEPARIN SODIUM,PORCINE 10 UNIT/ML
5 VIAL (ML) INTRAVENOUS
Status: CANCELLED | OUTPATIENT
Start: 2022-12-07

## 2022-09-14 NOTE — PROGRESS NOTES
Infusion Nursing Note:  Javid Amaya presents today for therapeutic phlebotomy.    Patient seen by provider today: No   present during visit today: Not Applicable.    Note: N/A.    Intravenous Access:  Phlebotomy performed in left AC using 16 gauge needle attached to blood collection bag.    Treatment Conditions:  Results reviewed, labs MET treatment parameters, ok to proceed with treatment.      Post Infusion Assessment:  Pt tolerated the removal of 500 ml whole blood without incident.       Discharge Plan:   Patient discharged in stable condition accompanied by: self.  Departure Mode: Ambulatory.  Next possible phlebotomy scheduled for 12/7.    Jessica Sosa RN

## 2022-10-15 ENCOUNTER — HEALTH MAINTENANCE LETTER (OUTPATIENT)
Age: 52
End: 2022-10-15

## 2022-12-07 ENCOUNTER — INFUSION THERAPY VISIT (OUTPATIENT)
Dept: INFUSION THERAPY | Facility: CLINIC | Age: 52
End: 2022-12-07
Attending: INTERNAL MEDICINE
Payer: COMMERCIAL

## 2022-12-07 ENCOUNTER — APPOINTMENT (OUTPATIENT)
Dept: LAB | Facility: CLINIC | Age: 52
End: 2022-12-07
Payer: COMMERCIAL

## 2022-12-07 VITALS — HEART RATE: 79 BPM | SYSTOLIC BLOOD PRESSURE: 117 MMHG | DIASTOLIC BLOOD PRESSURE: 79 MMHG

## 2022-12-07 DIAGNOSIS — E83.110 HEREDITARY HEMOCHROMATOSIS (H): Primary | ICD-10-CM

## 2022-12-07 LAB
FERRITIN SERPL-MCNC: 119 NG/ML (ref 31–409)
HCT VFR BLD AUTO: 44.5 % (ref 40–53)
HGB BLD-MCNC: 15.7 G/DL (ref 13.3–17.7)

## 2022-12-07 PROCEDURE — 36415 COLL VENOUS BLD VENIPUNCTURE: CPT | Performed by: INTERNAL MEDICINE

## 2022-12-07 PROCEDURE — 82728 ASSAY OF FERRITIN: CPT | Performed by: INTERNAL MEDICINE

## 2022-12-07 PROCEDURE — 99195 PHLEBOTOMY: CPT | Performed by: INTERNAL MEDICINE

## 2022-12-07 PROCEDURE — 85014 HEMATOCRIT: CPT | Performed by: INTERNAL MEDICINE

## 2022-12-07 RX ORDER — HEPARIN SODIUM,PORCINE 10 UNIT/ML
5 VIAL (ML) INTRAVENOUS
Status: CANCELLED | OUTPATIENT
Start: 2023-03-01

## 2022-12-07 RX ORDER — HEPARIN SODIUM (PORCINE) LOCK FLUSH IV SOLN 100 UNIT/ML 100 UNIT/ML
5 SOLUTION INTRAVENOUS
Status: CANCELLED | OUTPATIENT
Start: 2023-03-01

## 2022-12-07 NOTE — PROGRESS NOTES
Infusion Nursing Note:  Javid Amaya presents today for phlebotomy.    Patient seen by provider today: No   present during visit today: Not Applicable.    Note: 500 ml of blood removed from left AC without difficulty. Pt denies feeling dizzy lightheaded.     Intravenous Access:  Phleb kit used.    Treatment Conditions:  Lab Results   Component Value Date    HGB 15.7 12/07/2022    WBC 6.6 06/01/2022    ANEUTAUTO 4.8 06/01/2022     06/01/2022      Results reviewed, labs MET treatment parameters, ok to proceed with treatment.    Post Infusion Assessment:  Site patent and intact, free from redness, edema or discomfort.  Access discontinued per protocol.     Discharge Plan:   Patient discharged in stable condition accompanied by: self.  Departure Mode: Ambulatory.    Mir Mosley RN

## 2023-02-02 ENCOUNTER — LAB (OUTPATIENT)
Dept: LAB | Facility: CLINIC | Age: 53
End: 2023-02-02
Payer: COMMERCIAL

## 2023-02-02 DIAGNOSIS — Z79.899 DRUG THERAPY: Primary | ICD-10-CM

## 2023-02-02 LAB
ALBUMIN SERPL BCG-MCNC: 4.6 G/DL (ref 3.5–5.2)
ALP SERPL-CCNC: 86 U/L (ref 40–129)
ALT SERPL W P-5'-P-CCNC: 22 U/L (ref 10–50)
ANION GAP SERPL CALCULATED.3IONS-SCNC: 9 MMOL/L (ref 7–15)
AST SERPL W P-5'-P-CCNC: 26 U/L (ref 10–50)
BILIRUB DIRECT SERPL-MCNC: <0.2 MG/DL (ref 0–0.3)
BILIRUB SERPL-MCNC: 1.4 MG/DL
BUN SERPL-MCNC: 21.3 MG/DL (ref 6–20)
CALCIUM SERPL-MCNC: 9.5 MG/DL (ref 8.6–10)
CHLORIDE SERPL-SCNC: 99 MMOL/L (ref 98–107)
CHOLEST SERPL-MCNC: 266 MG/DL
CREAT SERPL-MCNC: 0.98 MG/DL (ref 0.67–1.17)
DEPRECATED HCO3 PLAS-SCNC: 30 MMOL/L (ref 22–29)
FASTING STATUS PATIENT QL REPORTED: YES
GFR SERPL CREATININE-BSD FRML MDRD: >90 ML/MIN/1.73M2
GLUCOSE SERPL-MCNC: 106 MG/DL (ref 70–99)
GLUCOSE SERPL-MCNC: 106 MG/DL (ref 70–99)
HBA1C MFR BLD: 5.5 % (ref 0–5.6)
HDLC SERPL-MCNC: 53 MG/DL
LDLC SERPL CALC-MCNC: 188 MG/DL
NONHDLC SERPL-MCNC: 213 MG/DL
POTASSIUM SERPL-SCNC: 4.4 MMOL/L (ref 3.4–5.3)
PROT SERPL-MCNC: 7.5 G/DL (ref 6.4–8.3)
SODIUM SERPL-SCNC: 138 MMOL/L (ref 136–145)
TRIGL SERPL-MCNC: 125 MG/DL

## 2023-02-02 PROCEDURE — 36415 COLL VENOUS BLD VENIPUNCTURE: CPT

## 2023-02-02 PROCEDURE — 80053 COMPREHEN METABOLIC PANEL: CPT

## 2023-02-02 PROCEDURE — 83036 HEMOGLOBIN GLYCOSYLATED A1C: CPT

## 2023-02-02 PROCEDURE — 82248 BILIRUBIN DIRECT: CPT

## 2023-02-02 PROCEDURE — 80061 LIPID PANEL: CPT

## 2023-02-21 ENCOUNTER — TELEPHONE (OUTPATIENT)
Dept: BEHAVIORAL HEALTH | Facility: CLINIC | Age: 53
End: 2023-02-21

## 2023-02-21 NOTE — TELEPHONE ENCOUNTER
Pt is a(n) adult (18+ out of HS) Seeking as eval for Adult Mental Health DA for Programmatic Care (Interested in DT, PHP, DDP, 55+). and is interested in Adult Mental Health or Dual Diagnosis Program (Either In-Person or Virtual).  Appointment scheduled by:  Patient.  (If patient is self-pay, we much complete a Cost Estimate and save it to the pt chart)  Caller name:  Javid    Caller phone #:[phone]  If in person, please state reason why:  Does better in person, does not do well with technology  Brief reason for appt:  I have done php @5 years ago and going through tough time with depresssion, anxiety and insomnia and seeing a psychitatrist with medss but struggling with dep anx and sleep    Cost estimate Did not get completed.  Contact information verified/updated: Yes

## 2023-02-24 ENCOUNTER — BEH TREATMENT PLAN (OUTPATIENT)
Dept: BEHAVIORAL HEALTH | Facility: CLINIC | Age: 53
End: 2023-02-24
Attending: PSYCHIATRY & NEUROLOGY
Payer: COMMERCIAL

## 2023-02-24 ENCOUNTER — HOSPITAL ENCOUNTER (OUTPATIENT)
Dept: BEHAVIORAL HEALTH | Facility: CLINIC | Age: 53
Discharge: HOME | End: 2023-02-24
Attending: FAMILY MEDICINE | Admitting: FAMILY MEDICINE
Payer: COMMERCIAL

## 2023-02-24 DIAGNOSIS — F33.2 SEVERE RECURRENT MAJOR DEPRESSION WITHOUT PSYCHOTIC FEATURES (H): Primary | ICD-10-CM

## 2023-02-24 PROCEDURE — 90791 PSYCH DIAGNOSTIC EVALUATION: CPT | Mod: GT,95 | Performed by: COUNSELOR

## 2023-02-24 RX ORDER — MIRTAZAPINE 7.5 MG/1
7.5 TABLET, FILM COATED ORAL AT BEDTIME
COMMUNITY
End: 2023-03-07 | Stop reason: DRUGHIGH

## 2023-02-24 RX ORDER — DESVENLAFAXINE 100 MG/1
50 TABLET, EXTENDED RELEASE ORAL DAILY
COMMUNITY
End: 2023-03-07

## 2023-02-24 ASSESSMENT — PATIENT HEALTH QUESTIONNAIRE - PHQ9
SUM OF ALL RESPONSES TO PHQ QUESTIONS 1-9: 23
10. IF YOU CHECKED OFF ANY PROBLEMS, HOW DIFFICULT HAVE THESE PROBLEMS MADE IT FOR YOU TO DO YOUR WORK, TAKE CARE OF THINGS AT HOME, OR GET ALONG WITH OTHER PEOPLE: EXTREMELY DIFFICULT
SUM OF ALL RESPONSES TO PHQ QUESTIONS 1-9: 23

## 2023-02-24 ASSESSMENT — COLUMBIA-SUICIDE SEVERITY RATING SCALE - C-SSRS
2. HAVE YOU ACTUALLY HAD ANY THOUGHTS OF KILLING YOURSELF?: NO
1. IN THE PAST MONTH, HAVE YOU WISHED YOU WERE DEAD OR WISHED YOU COULD GO TO SLEEP AND NOT WAKE UP?: YES
TOTAL  NUMBER OF ABORTED OR SELF INTERRUPTED ATTEMPTS LIFETIME: NO
TOTAL  NUMBER OF INTERRUPTED ATTEMPTS LIFETIME: NO
REASONS FOR IDEATION PAST MONTH: COMPLETELY TO END OR STOP THE PAIN (YOU COULDN'T GO ON LIVING WITH THE PAIN OR HOW YOU WERE FEELING)
REASONS FOR IDEATION LIFETIME: COMPLETELY TO END OR STOP THE PAIN (YOU COULDN'T GO ON LIVING WITH THE PAIN OR HOW YOU WERE FEELING)
1. HAVE YOU WISHED YOU WERE DEAD OR WISHED YOU COULD GO TO SLEEP AND NOT WAKE UP?: YES
ATTEMPT LIFETIME: NO
6. HAVE YOU EVER DONE ANYTHING, STARTED TO DO ANYTHING, OR PREPARED TO DO ANYTHING TO END YOUR LIFE?: NO

## 2023-02-24 ASSESSMENT — ANXIETY QUESTIONNAIRES
GAD7 TOTAL SCORE: 10
GAD7 TOTAL SCORE: 10
4. TROUBLE RELAXING: NEARLY EVERY DAY
3. WORRYING TOO MUCH ABOUT DIFFERENT THINGS: SEVERAL DAYS
GAD7 TOTAL SCORE: 10
5. BEING SO RESTLESS THAT IT IS HARD TO SIT STILL: NOT AT ALL
1. FEELING NERVOUS, ANXIOUS, OR ON EDGE: NEARLY EVERY DAY
7. FEELING AFRAID AS IF SOMETHING AWFUL MIGHT HAPPEN: SEVERAL DAYS
7. FEELING AFRAID AS IF SOMETHING AWFUL MIGHT HAPPEN: SEVERAL DAYS
8. IF YOU CHECKED OFF ANY PROBLEMS, HOW DIFFICULT HAVE THESE MADE IT FOR YOU TO DO YOUR WORK, TAKE CARE OF THINGS AT HOME, OR GET ALONG WITH OTHER PEOPLE?: EXTREMELY DIFFICULT
2. NOT BEING ABLE TO STOP OR CONTROL WORRYING: MORE THAN HALF THE DAYS
IF YOU CHECKED OFF ANY PROBLEMS ON THIS QUESTIONNAIRE, HOW DIFFICULT HAVE THESE PROBLEMS MADE IT FOR YOU TO DO YOUR WORK, TAKE CARE OF THINGS AT HOME, OR GET ALONG WITH OTHER PEOPLE: EXTREMELY DIFFICULT
6. BECOMING EASILY ANNOYED OR IRRITABLE: NOT AT ALL

## 2023-02-24 NOTE — PATIENT INSTRUCTIONS
Welcome to [facility] Adult Mental Health Outpatient Programs    Thank you for choosing [facility]!    Congratulations! You have completed the first step in your recovery by participating in a Diagnostic Assessment. With your input, SARAH Hankins, [facility], is recommending the following level of care and services to best meet your needs.    Managing mental health symptoms while balancing life stressors can be difficult. Our mental health programming will provide the group therapy, education, skills, and support needed to improve your well-being while living a healthy and manageable lifestyle.    All our Adult Mental Health Outpatient Programs are group-based and allow you to meet with peers who are trying to manage similar symptoms and/or life circumstances in a safe and therapeutic setting.    Recommendations and Plan:    Level of Care:  Partial Hospitalization Program ([facility]) [phone]    Start Date:  February / 27 / 2023    Schedule:     Monday through Friday from 9 AM to 2:50 PM (with a one-hour break for lunch).     Support Network Education Group:   From 9 am - Noon you will be in regular programming.   From 1-3 pm we provide a therapeutic session where you are encouraged to invite those whom you consider your support people (max of 6).   This is an educational meeting/discussion with your network to discuss how they can best support you. This is NOT family therapy.   You will be notified of the date of the Support Network Education Group and will spend time preparing for it with the psychotherapists.     If you were placed on a Wait List following your Diagnostic Assessment, please expect the following:  You will receive a phone call from the program within a few days to discuss a start date and plan.    Please contact the program at the number listed above if you are choosing to be removed from the Wait List, need to reschedule your start or if you have any additional questions.    Type of  Participation:      See A. below if joining virtual programming.   Skip to B. below if joining In-Person programming.    A. If you are participating in online group-based programming: It is a requirement that you be physically in the State of [place] when accessing services. Our providers must be licensed in the state you are located in.    NOTE: There is an option to access telehealth groups at our [facility] in [place], [place]. If you do not have access to telehealth at home and would like more information about this option, please discuss this with the  and/or .     To provide the best group experience for everyone, we expect confidentiality, regular attendance, and respectful participation by all.      Cameras need to be on during groups. We want to see you!   Be sure to be in a private place where others will not overhear or walk in. Using headphones and making sure your screen is not visible to others are steps you can take to ensure confidentiality.   What is said in group, stays in group. (All personal or identifying information shared in group should be kept confidential and not shared with anyone).  NOTE: Audio or Visual recordings are not allowed and may result in immediate discharge from the program.  Zoom may automatically show your first and last name unless you change it when logging into group. We encourage you to change your name to your first or preferred name. You may also include preferred pronouns.   Please be sitting upright in a comfortable position. This will maximize engagement and participation for everyone.   Please refrain from smoking, eating, driving, or engaging in other distracting activities during groups.  Facilitators may provide reminders of the above expectations during group as needed.    If your camera is off and you are not responding to prompts, facilitators will assume you have left and place you in the waiting room. You will  need to request to return to group.    Please do NOT cancel your appointments through poLight.  If you are going to be absent, please contact the program number and leave a message so staff will not expect you.   You will NOT be billed for any sessions you do not attend.    See additional attendance and program participation information below.     Accessing Virtual Groups:    The best way to attend groups is through your poLight account. Please ask staff if you need assistance setting up an account. poLight HELPLINE: [phone] or poLight - Login Page ([email or web address]).  You will also need to download Zoom Download for Windows - Zoom to your computer (preferred), phone or iPad/Tablet devise.   It is NOT necessary to log into or set up a Zoom account.  Log into My Chart each day before group.   Go to the  Visits  tab and select the current date.    You will be asked to verify personal information on the first day or for longer programs, every 30 days. Please allow extra time on your first day to complete this. You will also be asked to complete assessments regularly so we can monitor your progress and address concerns.    The daily invite through poLight expires 15 minutes after group starts.   You will need to call the program number to request a link to the group if you:   log out of group once it begins   are late to group   get disconnected   are unable to access group for any reason    There is a new link created every day.    Breaks are provided between groups (10 minutes)   Do not log out.  You may mute or pause your video.   The group facilitator may put you in virtual waiting room until the next group starts.    9. For Partial Hospitalization Program patients ONLY:    Please start the day using poLight as noted above. This is important for completion of other required assessments.   A link will automatically be sent to your email around 9:15 AM so you can access group if you are late or accidentally log  out. This will also provide access to group after lunch.  See information below.    10. NOTE: For patients accessing the virtual group through the [facility]: Staff at the Hub will set up virtual group daily. Please check-in at the .    B. If you are participating in the In-Person Partial Hospitalization Program, we are located at: [facility], [facility] located in the [facility] at: [address] [place] [address].    o If you are On-Site, masks are required at all hospitals and you will be screened for physical symptoms that could prevent you from joining group. Please contact the program if experiencing any symptoms in advance. Thank you.     O Lunch is NOT provided. You may bring a lunch or purchase one in our cafeteria or at [employer] located within the hospital.         Partial Hospitalization Program Overview:  This is our most intensive level of care offered in outpatient mental health and often provides a step-down from or alternative to an inpatient psychiatric hospitalization.       Length of Stay Typically, patients attend 10 or 11 program days, although this can vary depending on specific patient needs.       Treatment team   Multi-disciplinary team including a psychiatrist, psychotherapist, registered nurse, and occupational therapist.           Group-based programming 5 groups per day; 5 days per week  50 minutes per group  10-minute break between each group  Facilitated by a member of the multi-disciplinary team    Group Psychotherapy is provided daily, allowing time to check-in, process concerns and share feedback/support. All other groups include a topic and provide opportunities for education, skill building, discussion, and support.          Example Group Topics Topics vary and may be adjusted to meet the needs of the group.     Cognitive Distortions, Stages of Change, Self-Compassion, Nutrition, Boundaries, Model of Emotions,  Sensory Grounding, Grief and Loss, Self-Regulation, Values, Enhanced Mindfulness, Weekly Reflection/Planning, Strengths Assessment, Resilience, Relationship Mapping, Leisure,  & Prep, Shame, Core Beliefs, Vulnerability, Hope, Coping through the senses, Sleep, Medication Education, Motivation, and the Physiology of Depression and/or Anxiety.              Psychiatrist Patients will see the program psychiatrist a minimum of 1x/week for either the initial visit or follow-up.   The psychiatrist will partner with you and your other providers for medication management, as needed.   Psychiatry services are a requirement for this level of care and will be billed separately.   For insurance purposes, please coordinate with team to prevent scheduling to see the program psychiatrist on the same day you see your outpatient psychiatrist.      Individual Therapy Not provided in this program.     Physical Health Screen   Patients meet with a program nurse within the first week to complete a brief physical health screen. This is a program requirement.       FMLA or Short-term Disability We encourage you to request completion of paperwork from your long-term providers.   If this is not an option, please notify the program nurse as soon as possible.  We will do our best to help you coordinate completion of paperwork.   This is a short-term program and responsibility for paperwork must transfer to another provider when you discharge.   Medical Record requests: please contact Release of Information  at [phone] and allow up to 14 days for records once the authorization for release is received.        Treatment & Discharge Planning Patients meet individually with team members for treatment planning.   We will help you develop goals and identify strengths and/or barriers.   We will discuss your program participation, progress, and discharge planning.   We are available to assist with referrals and service coordination;  please let us know how best we can support your specific needs.   You will receive copies of your treatment plan and discharge summary via [employer].        An Important Note from your Program Treatment Team...    Welcome! We are happy to be partnering with you on your recovery journey. Our experienced clinicians have developed programming based on current research and evidence-based practice to provide you with high quality mental health treatment.     Attendance and Program Participation:  You will participate in a variety of groups each day. Our goal is to provide you with a rich and varied therapeutic healing environment knowing patients have unique experiences and preferred ways of sharing, learning, processing, and engaging in the recovery process.  You are expected to attend all groups on time.  If you are going to be late or absent, please let a team member know the reason. You can also leave that same information at the number listed above.  In the event of an unreported absence, we will reach out to you. If we are unable to reach you, know that we may call your emergency contact.  We always attempt to establish contact with your emergency contact prior to initiating a wellness check.  While it is important that you continue to attend appointments with your individual therapist, psychiatrist, and other medical providers, you are encouraged to schedule these appointments outside of group hours whenever possible.  If your attendance becomes a concern, your treatment team will have a discussion with you and may start an Attendance Agreement. Following your Attendance Agreement is required to prevent early discharge from the program.  To get the most out of the program and to support your wellbeing we require that you do not use any chemicals, tobacco or vape products on screen or during program hours. The team is available to assist you if this is something you struggle with.  Please be mindful that you are part  of a group; therefore, we ask that you be respectful of other group members' needs and do not use derogatory, offensive, or discriminatory language.  You will be removed from group if suspected of being under the influence of substances or if using language that negatively impacts the group.  Your treatment team will address any concerns with you and offer recommendations. A Behavior Agreement may be started. Following your Behavior Agreement is required to prevent early discharge from the program.  Communication with other group members outside of group is discouraged. This can affect your treatment and the way the group functions.  If you choose to share contact information with group peers AFTER you are discharged from the program, this decision is completely independent of any program coordination or support.  While in the program, participants may not engage in any sexual or intimate relationships with each other outside of group. This may result in immediate discharge of both participants from the program.     Trauma:  Many of our patients have experienced trauma. You are not alone. This can be challenging for patients to manage. All our team members have been trained in Trauma Informed Care and will provide you with the education, skills training, and support that you need to stabilize your symptoms.  Specific details and descriptions of abuse, assault, violence, neglect, etc., are best processed in individual therapy as to avoid triggering other group members.  Discussing how traumatic experiences have impacted beliefs about self/others/world and practicing skills to cope with symptoms is very appropriate for group therapy.     We look forward to working together to support your mental health.     We love feedback from our patients about our program!  Please take a few moments to respond to surveys sent out by [employer].  This will help us continue to make improvements and to keep the things that are    important to you!    SAFETY PLAN:      Name: Javid Amaya  YOB: 1970  Date: February 24, 2023   My prescriber: Maria M Foley at [facility]         My Triggers:      -Lack of sleep  -The thought of what am I going to do, how am I going to get through this.  -What will I do for work in the future.     Additional People, Places, and Things that I can access for support:     -Family  -Emergency room  -Crisis lines         What is important to me and makes life worth living:   -Family/Friends  -Anastasia       GREEN    Good Control  1. I feel good  2. No suicidal thoughts   3. Can work, sleep and play      Action Steps  1. Self-care: balanced meals, exercising, sleep practices, etc.  2. Take your medications as prescribed.  3. Continue meetings with therapist and prescriber.  4.  Do the healthy things that I enjoy.             YELLOW  Getting Worse  I have ANY of these:  1. I do not feel good  2. Difficulty Concentrating  3. Sleep is changing  4. Increase/Change in my thoughts to hurt self and/or others, but I can still manage and not act on it.   5. Not taking care of self.               Action Steps (in addition to the above):  1. Inform your therapist and psychiatric prescriber/PCP.  2. Keep taking your medications as prescribed.    3. Turn to people you can ask for help.  4. Use internal coping strategies -see below.  5. Create safe environment: store firearms for safety, lock and limit medications, notify friends/family of increase in symptoms, and reduce means to other identified method             RED  Get Help  If I have ANY of these:  1. Current and uncontrollable thoughts and/or behaviors to hurt self and/or others.      Actions to manage my safety  1. Contact your emergency person   2. Call my crisis team- [facility] [phone]  3. Or Call 911 or go to the emergency room right away          My coping strategies include the following:  -Mindfulness  -Exercise with son  -Play  cribbage/cards/puzzles with Yessenia  -Read the Bible    Other coping strategies to consider:  Reduce Extreme Emotion  QUICKLY:  Changing Your Body Chemistry      T:  Change your body Temperature to change your autonomic nervous system   Use Ice Water to calm yourself down FAST   Put your face in a bowl of ice water (this is the best way; have the person keep his/her face in ice water for 30-45 seconds - initial research is showing that the longer s/he can hold her/his face in the water, the better the response), or   Splash ice water on your face, or hold an ice pack on your face      I:  Intensely exercise to calm down a body revved up by emotion   Examples: running, walking fast, jumping, playing basketball, weight lifting, swimming, calisthenics, etc.   Engage in exercises that DO NOT include violent behaviors. Exercises that utilize violent behaviors tend to function as  behavioral rehearsal,  and rather than calming the person down, may actually  rev  the person up more, increasing the likelihood of violence, and lessening the likelihood that they will  burn off  energy.     P:  Progressively relax your muscles   Starting with your hands, moving to your forearms, upper arms, shoulders, neck, forehead, eyes, cheeks and lips, tongue and teeth, chest, upper back, stomach, buttocks, thighs, calves, ankles, feet   Tense (10 seconds,   of the way), then relax each muscle (all the way)   Notice the tension   Notice the difference when relaxed (by tensing first, and then relaxing, you are able to get a more thorough relaxation than by simply relaxing)      P: Paced breathing to relax   The standard technique is to begin with counting the number of steps one takes for a typical inhale, then counting the steps one takes for a typical exhale, and then lengthening the amount of steps for the exhalation by one or two steps.  OR  Repeat this pattern for 1-2 minutes  Inhale for four (4) seconds   Exhale for six (6) to eight (8)  seconds   Research demonstrated that one can change one's overall level of anxiety by doing this exercise for even a few minutes per day      Grounding Techniques:  Try to notice where you are, your surroundings including the people, the sounds like the TV or radio.  Concentrate on your breathing. Take a deep cleansing breath from your diaphragm. Count the breaths as you exhale. Make sure you breath slowly.  Hold something that you find comforting, for some it may be a stuffed animal or a blanket. Notice how it feels in your hands. Is it hard or soft?  During a non-crisis time make a list of positive affirmations. Print them out and keep them handy for times of intense anxiety. At those times, read them aloud.    Try the [employer] game:  Name 5 things you can see in the room with you  Name 4 things you can feel ( chair on my back  or  feet on floor )   Name 3 things you can hear right now ( people talking  or  tv )   Name 2 things you can smell right now (or, 2 things you like the smell of)   Name 1 good thing about yourself    Create A Safe Place  Image a safe place -- it can be a real or imaginary place:   What do you see -- especially colors?   What sounds do you hear?   What sensations do you feel?   What smells do you smell?   What people or animals would you want in your safe place?   Imagine a protective bubble, wall or boundary around your safe place.   Imagine a door or gate with a guard at your safe place.   Image a lock and key to your safe place and only you can unlock it.  You can draw or make a collage that represents your safe place.   Choose a souvenir of your safe place -- a color, an object, a song.   Keep your image of your safe place so you can come back to it when you need to.     Safety Concerns:    How to identify situations that make your mental health worse:  Triggers are things that make your mental health worse.  Look at the list below to help you find your triggers and what you can do about  them.     1. Identify Early Warning Signs:    Sometimes symptoms return, even when people do their best to stay well. Symptoms can develop over a short period of time with little or no warning, but most of the time they emerge gradually over several weeks.  Early warning signs are changes that people experience when a relapse is starting. Some early warning signs are common and others are not as common.     Common Early Warning Signs:    Feeling tense or nervous, Eating less or eating more, Trouble sleeping -either too much or too little sleep, Feeling depressed or low, Feeling irritable, Feeling like not being around other people, Trouble concentrating, Urges to harm self, Urges to harm others, and Urges to use drugs or alcohol     2. Identify action steps to take when warning signs are noticed:    Taking Action- It is important to take action if you are experiencing early warning signs of a relapse.  The faster you act, the more likely it is that you can avoid a full relapse.  It is helpful to identify several specific ways to cope with symptoms.      The following is my list of symptoms and coping strategies that I can use when they are present:    Symptom Coping Strategies   Anxiety -Talk with someone in your support system and let him or her know how you are feeling.  -Use relaxation techniques such as deep breathing or imagery.  -Use positive affirmations to counteract negative self-talk such as  I am learning to let go of worry.    Depression - Schedule your day; include activities you have to do and activities you enjoy doing.  - Get some exercise - walk, run, bike, or swim.  - Give yourself credit for even the smallest things you get done.   Sleep Difficulties   - Go to sleep at the same time every day.  - Do something relaxing before bed, such as drinking herbal tea or listening to music.  - Avoid having discussions about upsetting topics before going to bed.   Concentration Difficulties - Minimize  distractions so there is only one thing for you to focus on at a time.    - Ask the person you are having a conversation with to slow down or repeat things you are unsure of.

## 2023-02-24 NOTE — TELEPHONE ENCOUNTER
----- Message from Blanca Bucio, Psychiatric sent at 2/24/2023  2:00 PM CST -----  Regarding: PHP Start  Adult Mental Health Programmatic Care Schedule Request    Patient Name: Javid Amaya  Location of programming: [facility]  Start Date: 2/27    Group:   Adult Program Group: PHP 2 Track  Schedule:  M-F 9am-3pm  Number of visits to be scheduled: 10 days  Attending Provider (MD):  Dr Cassius Olivarez.  Visit Type:  In-Person    Accommodations Needed: No  Red Flags Identified/Substantiation: No  Consulted with Supervisor: No    Send to:   [[facility] Outpatient UR ([phone]]      NG 14 OBC's ([facility] BEHAVIORAL OUTPATIENT ASSESSMENTS [[id number]])   Claudia Villafuerte (Day Tx & 55+)  Luh Zapata (PHP & DDP)  Robles Brooks / Rich Olmedo

## 2023-02-27 ENCOUNTER — TELEPHONE (OUTPATIENT)
Dept: BEHAVIORAL HEALTH | Facility: CLINIC | Age: 53
End: 2023-02-27
Payer: COMMERCIAL

## 2023-02-27 ENCOUNTER — HOSPITAL ENCOUNTER (OUTPATIENT)
Dept: BEHAVIORAL HEALTH | Facility: CLINIC | Age: 53
Discharge: HOME | End: 2023-02-27
Attending: PSYCHIATRY & NEUROLOGY
Payer: COMMERCIAL

## 2023-02-27 VITALS
WEIGHT: 193 LBS | DIASTOLIC BLOOD PRESSURE: 72 MMHG | OXYGEN SATURATION: 96 % | TEMPERATURE: 97.5 F | SYSTOLIC BLOOD PRESSURE: 113 MMHG | HEART RATE: 79 BPM | HEIGHT: 69 IN | RESPIRATION RATE: 18 BRPM | BODY MASS INDEX: 28.58 KG/M2

## 2023-02-27 DIAGNOSIS — F33.2 SEVERE EPISODE OF RECURRENT MAJOR DEPRESSIVE DISORDER, WITHOUT PSYCHOTIC FEATURES (H): Primary | ICD-10-CM

## 2023-02-27 PROCEDURE — H0035 MH PARTIAL HOSP TX UNDER 24H: HCPCS | Performed by: OCCUPATIONAL THERAPIST

## 2023-02-27 PROCEDURE — 99205 OFFICE O/P NEW HI 60 MIN: CPT | Performed by: PSYCHIATRY & NEUROLOGY

## 2023-02-27 PROCEDURE — H0035 MH PARTIAL HOSP TX UNDER 24H: HCPCS | Performed by: COUNSELOR

## 2023-02-27 RX ORDER — VIT C/B6/B5/MAGNESIUM/HERB 173 50-5-6-5MG
CAPSULE ORAL
COMMUNITY
End: 2024-07-03 | Stop reason: ALTCHOICE

## 2023-02-27 RX ORDER — MULTIVITAMIN WITH IRON
1 TABLET ORAL DAILY
COMMUNITY

## 2023-02-27 ASSESSMENT — ANXIETY QUESTIONNAIRES
GAD7 TOTAL SCORE: 13
GAD7 TOTAL SCORE: 13
IF YOU CHECKED OFF ANY PROBLEMS ON THIS QUESTIONNAIRE, HOW DIFFICULT HAVE THESE PROBLEMS MADE IT FOR YOU TO DO YOUR WORK, TAKE CARE OF THINGS AT HOME, OR GET ALONG WITH OTHER PEOPLE: EXTREMELY DIFFICULT
5. BEING SO RESTLESS THAT IT IS HARD TO SIT STILL: NOT AT ALL
1. FEELING NERVOUS, ANXIOUS, OR ON EDGE: NEARLY EVERY DAY
3. WORRYING TOO MUCH ABOUT DIFFERENT THINGS: NEARLY EVERY DAY
2. NOT BEING ABLE TO STOP OR CONTROL WORRYING: MORE THAN HALF THE DAYS
7. FEELING AFRAID AS IF SOMETHING AWFUL MIGHT HAPPEN: MORE THAN HALF THE DAYS
6. BECOMING EASILY ANNOYED OR IRRITABLE: NOT AT ALL

## 2023-02-27 ASSESSMENT — COLUMBIA-SUICIDE SEVERITY RATING SCALE - C-SSRS
2. HAVE YOU ACTUALLY HAD ANY THOUGHTS OF KILLING YOURSELF?: NO
1. SINCE LAST CONTACT, HAVE YOU WISHED YOU WERE DEAD OR WISHED YOU COULD GO TO SLEEP AND NOT WAKE UP?: NO

## 2023-02-27 ASSESSMENT — PATIENT HEALTH QUESTIONNAIRE - PHQ9
SUM OF ALL RESPONSES TO PHQ QUESTIONS 1-9: 22
5. POOR APPETITE OR OVEREATING: NEARLY EVERY DAY

## 2023-02-27 NOTE — PROGRESS NOTES
"RN Review of Medical History / Physical Health Screen  Outpatient Mental Health Programs - [facility] Adult Partial Hospitalization Program    PATIENT'S NAME: Javid Amaya  Preferred name: Javid   He/Him/His/Himself 52 year old  MRN:   [id number]  :   1970  ACCT. NUMBER: [id number]  CURRENT AGE:  52 year old    DATE OF DIAGNOSTIC ASSESSMENT: 2023     DATE OF ADMISSION: 2023       Please see Diagnostic Assessment for additional Medical History.     General Health:   Have you had any exposure to any communicable disease in the past 2-3 weeks? no     Are you aware of safe sex practices? yes   Do you have a history of seizures?     If so, do you have a seizure plan? Known triggers?     Notify patient that we will call 911 (if virtual) or a code (if in-person), if we were to witness seizure during group. no    no  no    no n/a (in person)     Nutrition:    Are you on a special diet? If yes, please explain:  no   Do you have any concerns regarding your nutritional status? If yes, please explain:  no   Have you had any appetite changes in the last 3 months?  Yes, decreased     Have you had any weight loss or weight gain in the last 3 months?  No     Do you have a history of an eating disorder? no   Do you have a history of being in an eating disorder program? no         Height/Weight Review:  Patient reported height:  5'9\"      Patient reports weight:  Date last checked:  193lb          Patient height and weight recorded by RN in epic flowsheet: No; Unable to measure  Programmatic Care currently provided via telehealth. All pt weights and heights will be collected through patient self-report and recorded in physical health screening progress note upon admission to the program.      BMI Review:  Was the patient informed of BMI? no      Findings No Intervention         Fall Risk:   Have you had any falls in the past 3 months? no     Do you currently use any assistive devices for mobility?  "  no      Does the patient have medication concerns? no     Was an MTM referral placed? no      Does the patient have any acute or chronic pain concerns that might impact participation in the program? no       Additional Comments/Assessment: recent labs, last physical  a year    Per completion of the Medical History / Physical Health Screen, is there a recommendation to see / follow up with a primary care physician/clinic or dentist?    No.          Irina Fields RN  2/27/2023

## 2023-02-27 NOTE — GROUP NOTE
Psychotherapy Group Note    PATIENT'S NAME: Javid Amaya  MRN:   [id number]  :   1970  ACCT. NUMBER: [id number]  DATE OF SERVICE: 23  START TIME:  2:00 PM  END TIME:  2:50 PM  FACILITATOR: Janel Andujar LPCC  TOPIC: MH EBP Group: Social Support  [facility] Adult Partial Hospitalization Program  TRACK: PHP2    NUMBER OF PARTICIPANTS: 5    Summary of Group / Topics Discussed:  Social Support:  Building and educating social support systems: The patients engaged in a discussion of how their mental health symptoms are related to psychosocial impairment. The patients also shared experiences of when stigma associated with mental illness has created barriers between them and their social network. The patients benefitted from this group by exploring ways in which they can re-engage their social network to decrease feelings of isolation and loneliness.    Patient Session Goals / Objectives:    Reduce overidentification/fusion with mental illness as being a primary definer of identity.     Identify ways that support network can assist with recovery.     Reduce stigma associated with mental health diagnosis(es).    Improve interpersonal communication regarding symptoms    Build a sense of mastery and self-respect        Patient Participation / Response:  Minimally participated, only when prompted / asked.    Patient reported he plans to invite his wife to  group. He presented as engaged and attentive throughout group.     Treatment Plan:  Patient has an initial individualized treatment plan that was created as part of their diagnostic assessment / admission process.  A master individualized treatment plan is in the process of being developed with the patient and multi-disciplinary care team.    SARAH Dodd

## 2023-02-27 NOTE — H&P
"[facility]   Adult Mental Health Outpatient Programs  Provider Intake Note    Program: Partial Hospital Program (track 2, in-person)    Patient: Javid Amaya  MRN: [id number]  : 1970  Acct. No.: [id number]  Date of Service:  23  Session Start Time:  1315  Session End Time:  1405      Diagnostic Assessment Date: 2023    Outpatient Providers:  Current Outpatient Psychiatric Provider: Maria M Foley at [facility]  Current Outpatient Individual Psychotherapist: yes   Primary Care Provider: Jessica Russell     Identifying Data:  Javid Amaay, a 52 year old-year-old with reported history of an anxiety disorder; depression, presents for initial visit to provide oversight of programmatic care. Patient attended the session alone, uses he/him pronouns, and prefers to be called: \"Javid\"    Presenting Concern:  \"This is my third bout of depression.\"   Per diagnostic assessment: \"depression anxiety insomnia\"    History of Present Illness:  Chart reviewed, history as documented reviewed with Javid. Patient endorses:    Previous depressive episodes in  and   o Started citalopram after initial episode  o Once the symptoms had remitted, patient stopped citalopram   o In 2017, \"things started going downhill\"  o Psychiatrist he had previously been seeing a retired   o Tried restarting citalopram without success  o Talked to primary care provider, who started venlafaxine   o Ended up at [facility]  o Started bupropion   o After 2-3 months back to work, \"life was great\"    Roughly 2020:  o Started seeing chiropractor/whole health doctor  - Blood work and related testing revealed genetic hemochromatosis  - Received appropriate treatment (bloodletting, etc.)  o Under supervision from new whole health provider, slowly weaned off bupropion, then venlafaxine   - 8-9 months total for the process of tapering off both medications  o \"Healthwise, never felt better\"    Several months " "ago noted he was, \"letting things slide at work and that's uncharacteristic of me\"  o This was followed by a gradual worsening of symptoms  o \"One day in May I arrived at work and realized, 'I'm not going to sleep tonight'\"  o Did not sleep that night and has slept poorly since  o Returned to holistic provider  - They restarted bupropion as monotherapy   - Subsequently, \"my anxiety was through the roof\"  o Restarted venlafaxine at 150 mg  - Also struggled to tolerate that medication at that dose  o \"Got an emergency appointment\" with a psychiatric provider   - Stopped bupropion, continued venlafaxine  - \"Still not leveled out\"    Subsequently started with a provider at [facility]  o \"Still not doing well\"  o Discussed increase in venlafaxine to 225 or switch back to citalopram  o Chose the latter, was on citalopram for months without great improvement  o Subsequently switched back to venlafaxine, added olanzapine for sleep  o That provider stopped working with [facility]  - Patient now working with Maria M Foley, 3 visits so far  o Not doing well on olanzapine,   - \"Able to sleep but felt really out of it the next day\"  o Began cross-taper to desvenlafaxine from venlafaxine  - Under their set schedule, has 1 week left at 37.5 milligrams venlafaxine in combination with 50 mg desvenlafaxine     Next step is to discontinue venlafaxine and continue with 50 mg desvenlafaxine per their plan    When asked, has been taking current combination of medications for more than a week    Notes \"very little anxiety\" in prior depressive episodes     Similar pattern of insomnia followed by severe depressive symptoms    Enrolled in [facility]'s intensive outpatient program   o \"That was a flop\"  o Subsequently referred to partial hospital      Goals for Treatment (in addition to those goals listed in the BEH Treatment Plan Encounter):    \"I want to talk about medications\"    Generally hoping for return to function      Psychiatric Review of " "Symptoms:  Review of systems recorded in diagnostic assessment reviewed with patient.  Today notes:    Sleep:  o No difficulties with latency, \"I can fall asleep okay\"  o Typically will wake between 12 and 2 a.m.  - Can fall back asleep, but, \"my wife says I'm snoring but I'll feel wide awake the entire rest of the night    Appetite \"not great\"    Concentration is, \"all right as long as it's something I've done before\"    Poor energy; \"exhausted all the time\"    No motivation    Denies suicidal ideation as such, but when asked endorses, \"thoughts that I just don't want to be here\"      Safety Assessment:    Suicidal ideation: has passive suicidal thoughts described as noted above    Thoughts of non-suicidal self-injury: denied    Recent self-injurious behavior: denied    Homicidal ideation: denied    Other safety concerns: denied    Substance use:    denies    Medications:  Current Outpatient Medications   Medication Sig Dispense Refill     desvenlafaxine (PRISTIQ) 100 MG 24 hr tablet Take 50 mg by mouth daily       fish oil-omega-3 fatty acids 1000 MG capsule Take 2 g by mouth daily       hydrOXYzine (ATARAX) 25 MG tablet Take 0.5-2 tablets (12.5-50 mg) by mouth 3 times daily as needed for anxiety 60 tablet 2     mirtazapine (REMERON) 7.5 MG tablet Take 7.5 mg by mouth At Bedtime       Turmeric (CURCUMIN 95) 500 MG CAPS        valerian root 530 MG CAPS capsule        vitamin C with B complex (B COMPLEX-C) tablet Take 1 tablet by mouth daily       Vitamin D (Cholecalciferol) 25 MCG (1000 UT) TABS          The above list was reviewed and updated in [facility] with patient today.     Patient is taking medications as prescribed and denies adverse effects      Medical Review of Systems:  Pertinent: None      Recent Screenings:  WHODAS 2.0 Total Score 7/20/2022 2/24/2023   Total Score 30 35   Total Score MyChart 30 35       Metrics:  PHQ-9 scores:   PHQ-9 SCORE 7/5/2022 7/19/2022 2/24/2023 2/27/2023   PHQ-9 Total Score MyChart " "21 (Severe depression) - 23 (Severe depression) -   PHQ-9 Total Score 21 21 23 22       BENNY-7 scores:   BENNY-7 SCORE 7/20/2022 2/24/2023 2/27/2023   Total Score 13 (moderate anxiety) 10 (moderate anxiety) -   Total Score 13 10 13       CSSR-S:   PHQ 2/27/2023   PHQ-9 Total Score 22   Q9: Thoughts of better off dead/self-harm past 2 weeks More than half the days   F/U: Thoughts of suicide or self-harm -   F/U: Self harm-plan -   F/U: Self-harm action -   F/U: Safety concerns -         Psychiatric History:   Outpatient providers listed above.    Past medication trials include:    See above    Otherwise as noted above or in diagnostic assessment.       Substance Use History:  As noted above or in diagnostic assessment.     Past Medical History:  As noted above or in diagnostic assessment.     Vital Signs:  None since this is a phone/video visit.     Labs:  Most recent labs reviewed. Pertinent updates/findings: elevations in LDL and non-HDL cholesterol.     Family History:   As noted above or in diagnostic assessment.     Social History:   As noted above or in diagnostic assessment.     Legal History:  As noted above or in diagnostic assessment.     Significant Losses / Trauma / Abuse / Neglect Issues:  As noted above or in diagnostic assessment.       Mental Status Examination:  Vital Signs: /72   Pulse 79   Temp 97.5  F (36.4  C) (Tympanic)   Resp 18   Ht 1.753 m (5' 9\")   Wt 87.5 kg (193 lb)   SpO2 96%   BMI 28.50 kg/m     Appearance: appropriately groomed, appears stated age, and in mild distress.  Attitude: cooperative   Eye Contact: good   Muscle Strength and Tone: no gross abnormalities   Psychomotor Behavior: slowed   Gait and Station: deferred  Speech: slowed, monotone and decreased prosody  Associations: No loosening of associations  Thought Process: coherent and goal directed  Thought Content: no evidence of psychotic thought, passive suicidal ideation present, no auditory hallucinations present " "and no visual hallucinations present  Mood: \"depressed\"  Affect: mood congruent, intensity is blunted, constricted mobility and restricted range  Insight: good  Judgment: intact, adequate for safety  Impulse Control: intact  Oriented to: time, place, person and situation  Attention Span and Concentration: normal  Language: Intact  Recent and Remote Memory: intact to interview. Not formally assessed. No amnesia.  Fund of Knowledge/Assessment of Intelligence: Average  Capacity of Activities of Daily Living: Independent, able to participate in programmatic care services.      DSM5 Diagnosis/es:    ICD-10-CM    1. Severe episode of recurrent major depressive disorder, without psychotic features (H)  F33.2         Have not ruled out 300.02 (F41.1) Generalized Anxiety Disorder    Assessment/Plan:  Javid presents today for initial provider visit as part of program intake, coordination, and supervision.    Diagnostic standpoint this appears to be a third episode of a recurrent major depressive disorder. Patient endorses anxiety has not been a feature of prior depressive episodes and was not significantly present prior to this depressive episode and therefore I believe his anxiety is better characterized as a manifestation of his depression then a generalized anxiety disorder. Visit diagnosis updated to reflect this. Cannot rule out generalized anxiety disorder and diagnostic is ongoing.    Given the similarities between venlafaxine and desvenlafaxine I do not believe that the cross-taper need be as long as initially planned. Patient also saw previous benefit from bupropion. While it is not surprising that bupropion monotherapy exacerbated anxiety, I have found that when it is used in combination with any serotonergic \"antidepressant,\" bupropion is effective for depression while not exacerbating anxiety to the same extent. Patient is reluctant to try bupropion again so we will consider that as an option but not start it at " this time. No change to mirtazapine today but an increase in dose to 15 mg may provide necessary sedation still while also adding additional efficacy for treatment of the depression itself. As discussed with patient, we will either meet again later this week to discuss progress or patient may choose to forego that meeting, increase mirtazapine on his own, and we will check in early next week.    Symptoms are severe enough the patient withdrew from and ultimately quit his job and endorses he is unable to fill usual tasks and responsibilities at home. Patient also recently did not see benefit from an intensive outpatient program. Partial hospital is the most appropriate level of care.         Depression  o Discontinue venlafaxine  o Continue desvenlafaxine 50 mg daily  o Continue mirtazapine 7.5 mg by mouth daily  o No other changes to medication  o Continue partial hospital program      Anxiety  o See discussion of differential above  o Treatment as above    Risk Assessment    Today Javid endorses passive, intrusive suicidal ideation without plan or intent and i endorses amrit and family are safety/protective factors. Denies nonsuicidal self-injury or thoughts thereof    Javid is future-oriented and engaged in treatment planning     I do not feel that Javid meets criteria for a 72-hour involuntary hold and remains appropriate for an outpatient level of care.     Continue therapy as planned:    Enrolled in partial hospital program    Patient continues to meet criteria for recommended level of care.    Patient is expected to make a timely and significant improvement in the presenting acute symptoms as a result of participation in this program.    Patient would be at reasonable risk of requiring a higher level of care in the absence of current services.    Continue with individual therapist as appropriate    Safety plan reviewed.     To the Emergency Department as needed or call after hours crisis line at [phone] or  [phone]. [place] Crisis Text Line: Text [place] to 955318  or  Suicide LifeLine Chat: suicidepreTrustTeam.org/chat    Follow-up:     schedule an appointment with me or another program provider in approximately in 1 week(s) or sooner if needed.  Can speak with a staff member or call the appropriate program number (see below) to schedule    Follow up with outpatient provider(s) as planned or sooner if needed for acute medical concerns.    Questions or concerns:    Call program line with questions or concerns (see below)    [employer] [employer] may be used to communicate with your provider, but this is not intended to be used for emergencies.      [facility] Adult Mental Health Program lines:  [facility]: [phone]  Dual Disorder: [phone]  Adult Day Treatment:  [phone]  55+/Intensive Outpatient: [phone]      Community Resources:    National Suicide Prevention Lifeline: 988 from any phone, or 677-977-5830 (TTY: 159.250.2676). Call anytime for help.  (www.suicidepreventionlifeline.org)  National [employer] on Mental Illness (www.collins.org): 513-437-6184 or 401-306-0030.   Mental Health Association (www.mentalhealth.org): 502.890.6996 or 037-230-9771.  [place] Crisis Text Line: Text [place] to 550714  Suicide LifeLine Chat: suicidepreTrustTeam.org/chat    Treatment Objective(s) Addressed in This Session:  One purpose of today's call is for this writer to provide oversight of patient's care while receiving program services. Specific treatment goals addressed included personal safety, symptoms stabilization and management, wellness and mental health, and community resources/discharge planning.     Patient agrees with the current plan of care.    Signed:   Cassius Olivarez MD   February 27, 2023      Visit Details:  Type of service: In-person    Start/End Time: see above    Location (patient and provider): [facility] Adult Mental Health Outpatient Programmatic Care Offices    70 minutes spent  on the date of the encounter doing chart review, patient visit, documentation and discussion with other provider(s)    This document completed in part using [employer] dictation software and therefore may contain inadvertent word or phrase substitutions.

## 2023-02-27 NOTE — ADDENDUM NOTE
Encounter addended by: Blanca Bucio [facility] on: 2/27/2023 9:01 AM   Actions taken: Clinical Note Signed

## 2023-02-27 NOTE — GROUP NOTE
Process Group Note    PATIENT'S NAME: Javid Amaya  MRN:   [id number]  :   1970  ACCT. NUMBER: [id number]  DATE OF SERVICE: 23  START TIME:  9:00 AM  END TIME:  9:50 AM  FACILITATOR: Janel Andujar LPCC  TOPIC:  Process Group    Diagnoses:    296.33 (F33.2) Major Depressive Disorder, Recurrent Episode, Severe _, With melancholic features and With seasonal pattern  300.02 (F41.1) Generalized Anxiety Disorder.      [facility] Adult Partial Hospitalization Program  TRACK: [facility]    NUMBER OF PARTICIPANTS: 4          Data:    Session content: At the start of this group, patients were invited to check in by identifying themselves, describing their current emotional status, and identifying issues to address in this group.   Area(s) of treatment focus addressed in this session included Symptom Management, Personal Safety, Develop / Improve Independent Living Skills and Develop Socialization / Interpersonal Relationship Skills.    Patient reported feeling depressed and anxious.  Patient discussed working toward 'getting my life back.'  Skills they plan to practice to address the goal include mindfulness, challenging negative thoughts.  They identified a potential barrier as 'my mind.'  Patient reported no safety concerns or SIB.  Patient reported no substance use.  Patient reported taking medications as prescribed.  Patient reported feeling proud/grateful for family, support group, and getting in to the php program.         Therapeutic Interventions/Treatment Strategies:  Psychotherapist offered support, feedback and validation and reinforced use of skills. Treatment modalities used include Cognitive Behavioral Therapy and Dialectical Behavioral Therapy. Interventions include Behavioral Activation: Reinforced benefits/challenges of change process through applying skills to replace unwanted behaviors, Cognitive Restructuring:  Assisted patient in formulating new neutral/positive alternatives to  "challenge less helpful / ineffective thoughts and Coping Skills: Discussed how the use of intentional \"in the moment\" actions can help reduce distress.    Assessment:    Patient response:   Patient responded to session by accepting feedback, listening, focusing on goals, being attentive and verbalizing understanding    Possible barriers to participation / learning include: and no barriers identified    Health Issues:   None reported       Substance Use Review:   Substance Use: No active concerns identified.    Mental Status/Behavioral Observations  Appearance:   Appropriate   Eye Contact:   Good   Psychomotor Behavior: Normal   Attitude:   Cooperative   Orientation:   All  Speech   Rate / Production: Monotone    Volume:  Soft   Mood:    Anxious  Depressed   Affect:    Flat   Thought Content:   Clear  Thought Form:  Coherent  Logical     Insight:    Good     Plan:     Safety Plan: No current safety concerns identified.  Recommended that patient call 911 or go to the local ED should there be a change in any of these risk factors.     Barriers to treatment: None identified    Patient Contracts (see media tab):  None    Substance Use: Not addressed in session     Continue or Discharge: Patient will continue in Adult Partial Hospitalization Program (PHP)  as planned. Patient is likely to benefit from learning and using skills as they work toward the goals identified in their treatment plan.      Janel Andujar, SARAH  February 27, 2023    "

## 2023-02-27 NOTE — PROGRESS NOTES
SAFETY PLAN:       Name: Javid Amaya  YOB: 1970  Date: February 24, 2023   My prescriber: Maria M Foley at [facility]          My Triggers:       -Lack of sleep  -The thought of what am I going to do, how am I going to get through this.  -What will I do for work in the future.       Additional People, Places, and Things that I can access for support:      -Family  -Emergency room  -Crisis lines            What is important to me and makes life worth living:   -Family/Friends  -Anastasia         GREEN     Good Control  1. I feel good  2. No suicidal thoughts   3. Can work, sleep and play        Action Steps  1. Self-care: balanced meals, exercising, sleep practices, etc.  2. Take your medications as prescribed.  3. Continue meetings with therapist and prescriber.  4.  Do the healthy things that I enjoy.                YELLOW  Getting Worse  I have ANY of these:  1. I do not feel good  2. Difficulty Concentrating  3. Sleep is changing  4. Increase/Change in my thoughts to hurt self and/or others, but I can still manage and not act on it.   5. Not taking care of self.                Action Steps (in addition to the above):  1. Inform your therapist and psychiatric prescriber/PCP.  2. Keep taking your medications as prescribed.    3. Turn to people you can ask for help.  4. Use internal coping strategies -see below.  5. Create safe environment: store firearms for safety, lock and limit medications, notify friends/family of increase in symptoms, and reduce means to other identified method                RED  Get Help  If I have ANY of these:  1. Current and uncontrollable thoughts and/or behaviors to hurt self and/or others.        Actions to manage my safety  1. Contact your emergency person   2. Call my crisis team- [facility] [phone]  3. Or Call 911 or go to the emergency room right away            My coping strategies include the following:  -Mindfulness  -Exercise with son  -Play  cribbage/cards/puzzles with Yessenia  -Read the Bible     Other coping strategies to consider:  Reduce Extreme Emotion  QUICKLY:  Changing Your Body Chemistry      T:  Change your body Temperature to change your autonomic nervous system     Use Ice Water to calm yourself down FAST     Put your face in a bowl of ice water (this is the best way; have the person keep his/her face in ice water for 30-45 seconds - initial research is showing that the longer s/he can hold her/his face in the water, the better the response), or     Splash ice water on your face, or hold an ice pack on your face      I:  Intensely exercise to calm down a body revved up by emotion     Examples: running, walking fast, jumping, playing basketball, weight lifting, swimming, calisthenics, etc.     Engage in exercises that DO NOT include violent behaviors. Exercises that utilize violent behaviors tend to function as  behavioral rehearsal,  and rather than calming the person down, may actually  rev  the person up more, increasing the likelihood of violence, and lessening the likelihood that they will  burn off  energy.     P:  Progressively relax your muscles     Starting with your hands, moving to your forearms, upper arms, shoulders, neck, forehead, eyes, cheeks and lips, tongue and teeth, chest, upper back, stomach, buttocks, thighs, calves, ankles, feet     Tense (10 seconds,   of the way), then relax each muscle (all the way)     Notice the tension     Notice the difference when relaxed (by tensing first, and then relaxing, you are able to get a more thorough relaxation than by simply relaxing)      P: Paced breathing to relax     The standard technique is to begin with counting the number of steps one takes for a typical inhale, then counting the steps one takes for a typical exhale, and then lengthening the amount of steps for the exhalation by one or two steps.  OR    Repeat this pattern for 1-2 minutes  ? Inhale for four (4) seconds    ? Exhale for six (6) to eight (8) seconds   ? Research demonstrated that one can change one's overall level of anxiety by doing this exercise for even a few minutes per day      Grounding Techniques:    Try to notice where you are, your surroundings including the people, the sounds like the TV or radio.    Concentrate on your breathing. Take a deep cleansing breath from your diaphragm. Count the breaths as you exhale. Make sure you breath slowly.    Hold something that you find comforting, for some it may be a stuffed animal or a blanket. Notice how it feels in your hands. Is it hard or soft?    During a non-crisis time make a list of positive affirmations. Print them out and keep them handy for times of intense anxiety. At those times, read them aloud.     Try the SIPphone game:    Name 5 things you can see in the room with you    Name 4 things you can feel ( chair on my back  or  feet on floor )     Name 3 things you can hear right now ( people talking  or  tv )     Name 2 things you can smell right now (or, 2 things you like the smell of)     Name 1 good thing about yourself     Create A Safe Place    Image a safe place -- it can be a real or imaginary place:     What do you see -- especially colors?     What sounds do you hear?     What sensations do you feel?     What smells do you smell?     What people or animals would you want in your safe place?     Imagine a protective bubble, wall or boundary around your safe place.     Imagine a door or gate with a guard at your safe place.     Image a lock and key to your safe place and only you can unlock it.    You can draw or make a collage that represents your safe place.     Choose a souvenir of your safe place -- a color, an object, a song.     Keep your image of your safe place so you can come back to it when you need to.     Safety Concerns:     How to identify situations that make your mental health worse:  Triggers are things that make your mental health worse.   Look at the list below to help you find your triggers and what you can do about them.      1. Identify Early Warning Signs:     Sometimes symptoms return, even when people do their best to stay well. Symptoms can develop over a short period of time with little or no warning, but most of the time they emerge gradually over several weeks.  Early warning signs are changes that people experience when a relapse is starting. Some early warning signs are common and others are not as common.      Common Early Warning Signs:    Feeling tense or nervous, Eating less or eating more, Trouble sleeping -either too much or too little sleep, Feeling depressed or low, Feeling irritable, Feeling like not being around other people, Trouble concentrating, Urges to harm self, Urges to harm others, and Urges to use drugs or alcohol              2. Identify action steps to take when warning signs are noticed:     Taking Action- It is important to take action if you are experiencing early warning signs of a relapse.  The faster you act, the more likely it is that you can avoid a full relapse.  It is helpful to identify several specific ways to cope with symptoms.       The following is my list of symptoms and coping strategies that I can use when they are present:     Symptom Coping Strategies   Anxiety -Talk with someone in your support system and let him or her know how you are feeling.  -Use relaxation techniques such as deep breathing or imagery.  -Use positive affirmations to counteract negative self-talk such as  I am learning to let go of worry.    Depression - Schedule your day; include activities you have to do and activities you enjoy doing.  - Get some exercise - walk, run, bike, or swim.  - Give yourself credit for even the smallest things you get done.   Sleep Difficulties    - Go to sleep at the same time every day.  - Do something relaxing before bed, such as drinking herbal tea or listening to music.  - Avoid having  discussions about upsetting topics before going to bed.   Concentration Difficulties - Minimize distractions so there is only one thing for you to focus on at a time.    - Ask the person you are having a conversation with to slow down or repeat things you are unsure of.

## 2023-02-27 NOTE — GROUP NOTE
Psychotherapy Group Note    PATIENT'S NAME: Javid Amaya  MRN:   [id number]  :   1970  ACCT. NUMBER: [id number]  DATE OF SERVICE: 23  START TIME: 10:00 AM  END TIME: 10:50 AM  FACILITATOR: aJnel Andujar LPCC  TOPIC: MH EBP Group: Cognitive Restructuring  [facility] Adult Partial Hospitalization Program  TRACK: PHP2    NUMBER OF PARTICIPANTS: 4    Summary of Group / Topics Discussed:  Cognitive Restructuring: Core Beliefs: Patients received an overview of what a core belief is, and how they develop. Patients then began to identify their negative core beliefs. Patients worked to modify core beliefs with the goal of improved self-image and functioning.     Patient Session Goals / Objectives:    Familiarize self with the concept of core beliefs and how they develop.      Explore personal core beliefs (positive and negative)    Develop / advance recognition of the connection between negative thoughts and negative core beliefs.    Formulate new neutral/positive core beliefs               Patient Participation / Response:  Fully participated with the group by sharing personal reflections / insights and openly received / provided feedback with other participants.    Demonstrated understanding of topics discussed through group discussion and participation and Expressed understanding of the relationship between behaviors, thoughts, and feelings    Treatment Plan:  Patient has an initial individualized treatment plan that was created as part of their diagnostic assessment / admission process.  A master individualized treatment plan is in the process of being developed with the patient and multi-disciplinary care team.    SARAH Dodd

## 2023-02-27 NOTE — PROGRESS NOTES
"    Admission SBAR NOTE  Adult  Outpatient Programs          SITUATION:     Admission Date: 2023    Provider verified identity through the following two step process.  Patient provided: verbal spelling of full first and last name and Patient     Patient name:  Javid Amaya  Preferred name: Javid He/Him/His/Himself 52 year old  Diagnosis/-es (copy from Pandoodle, including ICD-10):   296.33 (F33.2) Major Depressive Disorder, Recurrent Episode, Severe _, With melancholic features and With seasonal pattern  300.02 (F41.1) Generalized Anxiety Disorder.      Assigned Program/Track: PHP2    Reviewed patient's schedule and informed them of any variation due to holidays. yes    Does the patient have any planned absences and/or barriers to admission/treatment? no  NOTE: impact of transportation, technology, childcare, work, or housing concerns.    Insurance: Payor: PREFERREDONE / Plan: PREFERREDONE HMO / Product Type: PPO /  Changes/Concerns: no    Does patient need an appointment with the program provider? yes  NOTE: If yes, please confirm/schedule provider visit.      BACKGROUND:     Patient's stated goal/reason for treatment (copy from Pandoodle; confirm with patient): \"\"depression, anxiety, insomnia\".     \"    Patient has a history of depression and anxiety and is currently followed by a psychiatrist at [facility]. 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 Symptoms returned within two months. Patient has been in his current depressive episode since May or last year. He has had multiple medication changes since that time. Symptoms continue to persist.     ASSESSMENT:     Please consult  if any of the following concerns may " impact admission/participation in program:     PHQ, BENNY and PROMIS done within 7 days OR send upon admission if over 7 days.      Antelope Suicide Severity Rating (select Lifetime/Recent):   [place]elope Suicide Severity Rating Scale (Lifetime/Recent)  [place]elope Suicide Severity Rating (Lifetime/Recent) 7/22/2022 2/24/2023   1. Wish to be Dead (Lifetime) 0 1   Wish to be Dead Description (Lifetime) - Passive, just doesn't want to wake up   1. Wish to be Dead (Past 1 Month) - 1   Wish to be Dead Description (Past 1 Month) - Passive, just doesn't want to wake up   2. Non-Specific Active Suicidal Thoughts (Lifetime) 0 0   Most Severe Ideation Rating (Lifetime) - 5   Most Severe Ideation Rating (Past 1 Month) - 3   Frequency (Lifetime) - 1   Frequency (Past 1 Month) - 4   Duration (Lifetime) - 1   Duration (Past 1 Month) - 1   Controllability (Lifetime) - 2   Controllability (Past 1 Month) - 2   Deterrents (Lifetime) - 1   Deterrents (Past 1 Month) - 1   Reasons for Ideation (Lifetime) - 5   Reasons for Ideation (Past 1 Month) - 5   Actual Attempt (Lifetime) 0 0   Has subject engaged in non-suicidal self-injurious behavior? (Lifetime) 0 0   Interrupted Attempts (Lifetime) 0 0   Aborted or Self-Interrupted Attempt (Lifetime) 0 0   Preparatory Acts or Behavior (Lifetime) 0 0   Calculated C-SSRS Risk Score (Lifetime/Recent) No Risk Indicated Low Risk       LOCUS completed for recommended level of care? yes  - Coordinating with  to complete variance for 18  Composite Score     IOP: 17-19; PHP: 20-22     Copy/Paste current Safety Plan to the BEH TX PLAN ENCOUNTER. yes    Safety status/concerns: denies     Substance use concerns: no    Pertinent Medical/Nutritional concerns: no    Review Tele-Health Requirements (including secure environment, confidentiality, in-state status, equipment needs and process - encourage MyChart): N/a - in person    Confirm Emergency Contact listed in the SnapShot/Demographics with patient and  notify OBC if an update is required. yes  Primary Emergency Contact: MAJO PIERRE Phone: [phone]              Relation: Spouse      Paper or Docusign requirements for ROIs, e-RAFAEL, emergency contact, etc have been completed? yes  If not, do upon admission.     Does patient have FMLA or Short-Term Disability requests/plans? no  NOTE: Whenever possible, FMLA or Short-Term Disability paperwork needs to be managed/completed by the patient's community provider.   Exceptions: Patient does not have a community provider AND request is specific to mental health and time off for the duration of the program participation.    Notify RN Triage as soon as possible.     Care Providers/Medication Management Needs:     Does patient have a current community or other [facility] provider prescribing medications for mental health? yes  NOTE: Delete below if not applicable:    Psychiatric Provider (or PCP if managing MH meds)/Name: Maria M Foley      [facility] name/location: [facility]       NOTE: Inform patients, program is temporary and we will not be transferring care. Patient's should continue to see their community provider.       Individual Therapist/Name: reports that he has has outpatient counseling       Patient will continue to see above provider while participating in program: yes        RECOMMENDATIONS:     Patient Admission Completed: yes    Care Team, referrals made/needed: no  PCP: Jessica Russell  NOTE: Notify RN, as needed, to make internal referrals.                                                             Completed by: Irina Fields RN

## 2023-02-28 ENCOUNTER — HOSPITAL ENCOUNTER (OUTPATIENT)
Dept: BEHAVIORAL HEALTH | Facility: CLINIC | Age: 53
Discharge: HOME | End: 2023-02-28
Attending: PSYCHIATRY & NEUROLOGY
Payer: COMMERCIAL

## 2023-02-28 PROBLEM — F41.1 GENERALIZED ANXIETY DISORDER: Status: RESOLVED | Noted: 2017-07-07 | Resolved: 2022-06-01

## 2023-02-28 PROBLEM — F33.2 SEVERE EPISODE OF RECURRENT MAJOR DEPRESSIVE DISORDER, WITHOUT PSYCHOTIC FEATURES (H): Status: ACTIVE | Noted: 2017-07-07

## 2023-02-28 PROCEDURE — H0035 MH PARTIAL HOSP TX UNDER 24H: HCPCS | Performed by: COUNSELOR

## 2023-02-28 PROCEDURE — H0035 MH PARTIAL HOSP TX UNDER 24H: HCPCS | Performed by: OCCUPATIONAL THERAPIST

## 2023-02-28 PROCEDURE — H0035 MH PARTIAL HOSP TX UNDER 24H: HCPCS

## 2023-02-28 NOTE — GROUP NOTE
Psychoeducation Group Note    PATIENT'S NAME: Javid Amaya  MRN:   [id number]  :   1970  ACCT. NUMBER: [id number]  DATE OF SERVICE: 23  START TIME: 11:00 AM  END TIME: 11:50 AM  FACILITATOR: Cyndee Locke OTR/L  TOPIC: MH PHP OT Group: Self- Regulation Skills  [facility] Partial Hospitalization Program  TRACK: 2    NUMBER OF PARTICIPANTS: 5    Summary of Group / Topics Discussed:  Sensory Grounding Skills:  Patients actively participated in a psychoeducational discussion focused on identifying and utilizing skills for grounding when experiencing dissociation, flashbacks, panic attacks, ruminations, and/or suicidal thoughts.  Patients also participated in a discussion focused on identifying skills that can be used for preventative purposes.  Patient's explored body based skills (bottom up processing skills) and techniques to help manage symptoms and ground themselves so they can be in control and comfortable and able to function in different environments.         Patient Session Goals / Objectives:    [place] how the ANS works and importance of its  impact on functioning and mental wellbeing    [place] how developing interoceptive awareness can help one self-regulate sooner rather than later    Identified signs and symptoms when grounding skills could be helpful     Identified specific and individualized neurosensory skills to help when distressed and for crisis management    Established a plan for practice of these skills in their own environments       Patient Participation / Response:  Moderately participated, sharing some personal reflections / insights and adequately adequately received / provided feedback with other participants.    Patient presentation: quiet in session; mood seemed low and anxious; appeared to have fair concentration and Patient would benefit from additional opportunities to practice the content to be able to generalize it to their everyday life with increased  intentionality, consistency, and efficacy in support of their psychiatric recovery    Javid began the Partial Hospitalization Program today.  He was welcomed and oriented to OT groups.  Encouraged Javid to ask questions as needed.     Treatment Plan:  Patient has an initial individualized treatment plan that was created as part of their diagnostic assessment / admission process.  A master individualized treatment plan is in the process of being developed with the patient and multi-disciplinary care team.  Continue to assess.  Will complete OT assessment with Javid in the next couple of treatment days.      Cyndee Locke, OTR/L

## 2023-02-28 NOTE — ADDENDUM NOTE
Encounter addended by: Cassius Olivarez MD on: 2/28/2023 4:27 PM   Actions taken: Charge Capture section accepted

## 2023-02-28 NOTE — GROUP NOTE
Psychoeducation Group Note    PATIENT'S NAME: Javid Amaya  MRN:   [id number]  :   1970  ACCT. NUMBER: [id number]  DATE OF SERVICE: 23  START TIME: 10:00 AM  END TIME: 10:50 AM  FACILITATOR: Irina Fields RN  TOPIC:  Wellness Group: Mind/Body Practice & Complementary  [facility] Adult Partial Hospitalization Program  TRACK: 2    NUMBER OF PARTICIPANTS: 4    Summary of Group / Topics Discussed:  Mind/Body Practice & Complementary Therapies:  Progressive Muscle Relaxation: In addition to affecting our mood and behavior, psychological stress can cause a myriad of physical symptoms in our body. Patients were educated on these effects and guided to increased self-awareness of how stress affects their body. The purpose, benefits, history, and techniques of progressive muscle relaxation were discussed. In an instructor guided experiential, patients were guided to practice PMR to help reduce physical symptoms of psychological stress and achieve a more balanced feeling of well-being.    Patient Session Goals / Objectives:  ? Identified physiological symptoms of stress on the body  ? Listed & Explained the purpose and benefits to practicing PMR   ? Practiced progressive muscle relaxation experiential      Patient Participation / Response:  Fully participated with the group by sharing personal reflections / insights and openly received / provided feedback with other participants.    Demonstrated understanding of topics discussed through group discussion and participation and Identified / Expressed personal readiness to practice skills    Treatment Plan:  Patient has an initial individualized treatment plan that was created as part of their diagnostic assessment / admission process.  A master individualized treatment plan is in the process of being developed with the patient and multi-disciplinary care team.    Irina Fields RN

## 2023-03-01 ENCOUNTER — HOSPITAL ENCOUNTER (OUTPATIENT)
Dept: BEHAVIORAL HEALTH | Facility: CLINIC | Age: 53
Discharge: HOME | End: 2023-03-01
Attending: PSYCHIATRY & NEUROLOGY
Payer: COMMERCIAL

## 2023-03-01 DIAGNOSIS — F33.2 SEVERE EPISODE OF RECURRENT MAJOR DEPRESSIVE DISORDER, WITHOUT PSYCHOTIC FEATURES (H): Primary | ICD-10-CM

## 2023-03-01 PROCEDURE — H0035 MH PARTIAL HOSP TX UNDER 24H: HCPCS

## 2023-03-01 PROCEDURE — H0035 MH PARTIAL HOSP TX UNDER 24H: HCPCS | Performed by: OCCUPATIONAL THERAPIST

## 2023-03-01 PROCEDURE — H0035 MH PARTIAL HOSP TX UNDER 24H: HCPCS | Performed by: COUNSELOR

## 2023-03-01 NOTE — GROUP NOTE
"OT Clinic Group Note    PATIENT'S NAME: Javid Amaya  MRN:   [id number]  :   1970  ACCT. NUMBER: [id number]  DATE OF SERVICE: 23  START TIME: 11:00 AM  END TIME: 11:50 AM  FACILITATOR: Cyndee Locke OTR/L  TOPIC: [facility] OT Group: Occupational Therapy Clinic  [facility] Adult Partial Hospitalization Program  TRACK: 2    NUMBER OF PARTICIPANTS: 4    Summary of Group / Topics Discussed:  Occupational Therapy Clinic: Provided opportunity for patients to independently choose and engage in a therapeutic activity that supports progress towards their goals and psychiatric recovery. The Occupational Therapy Clinic provides a context for patients to monitor their symptoms, gain self-awareness, practice skills (self-regulation, mindfulness, self-talk, focus/concentration, social, productivity), build a sense of self efficacy and mastery, as well as receive validation, support, and resources. OT checks in, observes, assesses, and monitors performance skills and patterns in context with each group member. Patient completed the Occupational Self Assessment (RAFI) in which identified functional areas their mental health status is impacting and which are most important for them to work on while in the Partial Hospitalization Program. Patient was provided orientation to the OT clinic and overview of purpose of the OT clinic in support of meeting their goals.     Patients were also given the opportunity to reflect and identified different types of occupations (activities) they participate in to maintain and/or build resilience in their lives.  Patients were taught about how \"doing\" promotes mental health recovery by providing, routine and structure, empowerment, sense of self, and connectedness.  Patients identified occupations (activities) that promote mental health: connection, centering, creation, contemplation, and contribution.  Patients were also given the opportunity to improve self efficacy, self " sufficiency, quality of life and a sense of mastery and competency by identifying positive aspects of their life and to instill hope.         Patient Session Goals / Objectives:    Independently identify a purposeful and meaningful therapeutic activity.    Identified which goal(s) they are intentionally working on during session.     Reflected on their performance and share insight about their progress.    Practiced and identified a way to generalize a skill to their everyday life    Identified occupations (activities) they can use to promote mental health recovery and as a way to effectively manage both mental health and substance abuse/abuse symptoms     Improved awareness of positive qualities of occupations they currently participate in and new occupations they might try and how this contribute to the process of recovery and mental health wellbeing and resiliency        Patient Participation / Response:  Fully participated with the group by sharing personal reflections / insights and openly received / provided feedback with other participants.    Patient presentation: active in group working on his occupational assessment and reviewing handout on meaningful occupations; able to identify how his depressive and anxiety symptoms impact many areas of his life currently; seemed open to ideas/feedback, Verbalized understanding of content and Patient would benefit from additional opportunities to practice the content to be able to generalize it to their everyday life with increased intentionality, consistency, and efficacy in support of their psychiatric recovery    Treatment Plan:  Patient has an initial individualized treatment plan that was created as part of their diagnostic assessment / admission process.  A master individualized treatment plan is in the process of being developed with the patient and multi-disciplinary care team.  Continue to assess.  Will complete OT assessment with Javid in the next couple of  treatment days.      Cyndee Locke, OTR/L

## 2023-03-01 NOTE — GROUP NOTE
"Process Group Note    PATIENT'S NAME: Javid Amaya  MRN:   [id number]  :   1970  ACCT. NUMBER: [id number]  DATE OF SERVICE: 3/01/23  START TIME: 10:00 AM  END TIME: 10:50 AM  FACILITATOR: Janel Andujar LPCC  TOPIC:  Process Group    Diagnoses:    Severe episode of recurrent major depressive disorder, without psychotic features (H)   F33.2      [facility] Partial Hospitalization Program  TRACK: [facility]    NUMBER OF PARTICIPANTS: 6          Data:    Session content: At the start of this group, patients were invited to check in by identifying themselves, describing their current emotional status, and identifying issues to address in this group.   Area(s) of treatment focus addressed in this session included Symptom Management, Personal Safety, Develop / Improve Independent Living Skills and Develop Socialization / Interpersonal Relationship Skills.    Patient reported feeling 'down, depressed, anxious.'  Patient discussed working toward getting through the day.  Skills they plan to practice to address the goal include breathing, praying, thinking positively.  They identified a potential barrier as 'cant control the anxiety.'  Patient reported no safety concerns or SIB.  Patient reported no substance use.  Patient reported taking medications as prescribed.  Patient reported feeling proud/grateful for family and friends.         Therapeutic Interventions/Treatment Strategies:  Psychotherapist offered support, feedback and validation and reinforced use of skills. Treatment modalities used include Cognitive Behavioral Therapy and Dialectical Behavioral Therapy. Interventions include Cognitive Restructuring:  Assisted patient in formulating new neutral/positive alternatives to challenge less helpful / ineffective thoughts, Coping Skills: Discussed use of self-soothe skills to decrease distress in the body, Discussed how the use of intentional \"in the moment\" actions can help reduce distress, " Facilitated understanding of  what factors may contribute to symptom relapse and skills plan to manage symptom relapse  and Addressed barriers to utilizing coping skills when in distress and Symptoms Management: Promoted understanding of their diagnoses and how it impacts their functioning.    Assessment:    Patient response:   Patient responded to session by accepting feedback, listening, being attentive and verbalizing understanding    Possible barriers to participation / learning include: and no barriers identified    Health Issues:   None reported       Substance Use Review:   Substance Use: No active concerns identified.    Mental Status/Behavioral Observations  Appearance:   Appropriate   Eye Contact:   Good   Psychomotor Behavior: Retarded (Slowed)   Attitude:   Cooperative   Orientation:   All  Speech   Rate / Production: Monotone    Volume:  Normal   Mood:    Anxious  Depressed   Affect:    Blunted  Flat   Thought Content:   Clear  Thought Form:  Coherent  Logical     Insight:    Good     Plan:     Safety Plan: No current safety concerns identified.  Recommended that patient call 911 or go to the local ED should there be a change in any of these risk factors.     Barriers to treatment: None identified    Patient Contracts (see media tab):  None    Substance Use: Not addressed in session     Continue or Discharge: Patient will continue in Adult Partial Hospitalization Program (PHP)  as planned. Patient is likely to benefit from learning and using skills as they work toward the goals identified in their treatment plan.      SARAH Dodd  March 1, 2023

## 2023-03-01 NOTE — GROUP NOTE
Psychoeducation Group Note    PATIENT'S NAME: Javid Amaya  MRN:   [id number]  :   1970  ACCT. NUMBER: [id number]  DATE OF SERVICE: 3/01/23  START TIME:  9:00 AM  END TIME:  9:50 AM  FACILITATOR: Param Barrios RN  TOPIC:  Wellness Group: [facility] Adult Partial Hospitalization Program  TRACK: PHP2    NUMBER OF PARTICIPANTS: 4    Summary of Group / Topics Discussed:  Brain Health:  Pathophysiology of Mood Disorders: Patients were educated on mood disorder etiology and neuroscience, risk factors, symptoms, and pharmacologic, psychotherapeutic, and complementary treatment options. Patients were guided on a discussion of mental, behavioral, and physical symptoms and shared their symptoms with the group.     Patient Session Goals / Objectives:  ? Described what mood disorders are and identified risk factors   ? Explained how chemical imbalances in the brain can cause symptoms and how medications work to reverse this imbalance   ? Identified and described pharmacologic, psychotherapeutic, and complementary treatment options      Patient Participation / Response:  Fully participated with the group by sharing personal reflections / insights and openly received / provided feedback with other participants.    Demonstrated understanding of topics discussed through group discussion and participation    Treatment Plan:  Patient has a current master individualized treatment plan.  See Epic treatment plan for more information.    Param Barrios RN

## 2023-03-01 NOTE — GROUP NOTE
Process Group Note    PATIENT'S NAME: Javid Amaya  MRN:   [id number]  :   1970  ACCT. NUMBER: [id number]  DATE OF SERVICE: 23  START TIME:  9:00 AM  END TIME:  9:50 AM  FACILITATOR: Finn Ramos LMFT  TOPIC:  Process Group    Diagnoses:  1. Severe episode of recurrent major depressive disorder, without psychotic features (H)  F33.2            Have not ruled out 300.02 (F41.1) Generalized Anxiety Disorder      [facility] Adult Partial Hospitalization Program  TRACK: PHP2    NUMBER OF PARTICIPANTS: 4          Data:    Session content: At the start of this group, patients were invited to check in by identifying themselves, describing their current emotional status, and identifying issues to address in this group.   Area(s) of treatment focus addressed in this session included Symptom Management, Personal Safety and Community Resources/Discharge Planning.  Patient reported feeling anxious and depressed  just tired of this bout of depression and anxiety.    Patient discussed working toward  I want to get my life back, I want to be able to function like I used to.    For skills they will use to address their goal(s), patient identified breathing, mindfulness, and sleep hygiene.   A barrier to working toward their goal(s) and/or addressing mental health symptoms the patient identified was getting the medication to work and  my mind, just not being able to see things through to the light at the end of the tunnel.   Patient reported thoughts of wanting to go to sleep at night and not wake up because  I m spent.  He is committed to safety.  Patient reported no substance use. Patient reported they are taking their medications as prescribed.   Patient reported feeling proud/grateful for family.  Patient discussed with the treatment group that he has been in this bout of depression for about 10 months.    Therapeutic Interventions/Treatment Strategies:  Psychotherapist offered support, feedback  and validation. Treatment modalities used include Cognitive Behavioral Therapy. Interventions include Coping Skills: Facilitated understanding of  what factors may contribute to symptom relapse and skills plan to manage symptom relapse  and Symptoms Management: Promoted understanding of their diagnoses and how it impacts their functioning.    Assessment:    Patient response:   Patient responded to session by accepting feedback, giving feedback, listening, focusing on goals, being attentive and accepting support    Possible barriers to participation / learning include: Severity of Symptoms    Health Issues:   None reported       Substance Use Review:   Substance Use: No active concerns identified.    Mental Status/Behavioral Observations  Appearance:   Appropriate   Eye Contact:   Good   Psychomotor Behavior: Normal   Attitude:   Cooperative   Orientation:   All  Speech   Rate / Production: Normal    Volume:  Normal   Mood:    Anxious Depressed  Affect:    Subdued   Thought Content:   Clear and Safety reports thoughts of wanting to go to sleep at night and not wake up because  I m spent.  Denied Suicidal ideation. He is committed to safety.  Thought Form:  Coherent  Logical     Insight:    Good     Plan:     Safety Plan: Committed to safety.  Recommended that patient call 911 or go to the local ED should there be a change in any of these risk factors.    Barriers to treatment: None identified    Patient Contracts (see media tab):  None    Substance Use: Provided encouragement towards sobriety     Continue or Discharge: Patient will continue in Adult Partial Hospitalization Program (PHP)  as planned. Patient is likely to benefit from learning and using skills as they work toward the goals identified in their treatment plan.      MANOLO Connolly  March 1, 2023

## 2023-03-01 NOTE — GROUP NOTE
Psychotherapy Group Note    PATIENT'S NAME: Javid Amaya  MRN:   [id number]  :   1970  ACCT. NUMBER: [id number]  DATE OF SERVICE: 3/01/23  START TIME:  2:00 PM  END TIME:  2:50 PM  FACILITATOR: Janel Andujar LPCC  TOPIC: MH EBP Group: Social Support  [facility] Adult Partial Hospitalization Program  TRACK: [facility]    NUMBER OF PARTICIPANTS: 5    Discussed:Social Support:  Building and communicating with individual therapy supports: The patients engaged in a discussion of how locate, connect with and communicate with individual therapy providers Patient Session Goals / Objectives:    Identify resources for locating individual therapists       Identify ways that individual therapy can assist with recovery.       Identify ways to communicate needs to an individual therapist      Patient Participation / Response:  Minimally participated, only when prompted / asked.    Patient reported not currently having an individual therapist, but plans to look for one closer to his hometown to begin after discharge from [facility]. Patient presented as attentive.     Treatment Plan:  Patient has an initial individualized treatment plan that was created as part of their diagnostic assessment / admission process.  A master individualized treatment plan is in the process of being developed with the patient and multi-disciplinary care team.    SARAH Dodd

## 2023-03-01 NOTE — GROUP NOTE
Psychotherapy Group Note    PATIENT'S NAME: Javid Amaya  MRN:   [id number]  :   1970  ACCT. NUMBER: [id number]  DATE OF SERVICE: 3/01/23  START TIME:  1:00 PM  END TIME:  1:50 PM  FACILITATOR: Janel Andujar LPCC  TOPIC:  EBP Group: Coping Skills  [facility] Adult Partial Hospitalization Program  TRACK: PHP2    NUMBER OF PARTICIPANTS: 5    Summary of Group / Topics Discussed:  Coping Skills: Radical Acceptance: Patients learned radical acceptance as a way to tolerate heightened stress in the moment.  Patients identified situations that necessitate radical acceptance.  They focused on applying acceptance of the moment to tolerate difficult emotions and events. Patients discussed how to distinguish when this can be useful in their lives and when other skills are more relevant or helpful.    Patient Session Goals / Objectives:    Understand that some amount of pain exists for each of us in our lives    Process the difficulty of acceptance in our lives and benefits of radical acceptance to mood and functioning.    Demonstrate understanding of when to use the radical acceptance to tolerate distress by providing examples of situations where this could be applied.    Identify barriers to applying radical acceptance to difficult situations and explore strategies to overcome them        Patient Participation / Response:  Minimally participated, only when prompted / asked.    Demonstrated understanding of topics discussed through group discussion and participation, Expressed understanding of the relevance / importance of coping skills at distressing times in life and Practiced 2-3 new coping skills in session    Treatment Plan:  Patient has an initial individualized treatment plan that was created as part of their diagnostic assessment / admission process.  A master individualized treatment plan is in the process of being developed with the patient and multi-disciplinary care team.    Janel  Pratima, SARAH

## 2023-03-01 NOTE — GROUP NOTE
Psychotherapy Group Note    PATIENT'S NAME: Javid Amaya  MRN:   [id number]  :   1970  ACCT. NUMBER: [id number]  DATE OF SERVICE: 23  START TIME:  1:00 PM  END TIME:  2:50 PM  FACILITATOR: Finn Ramos LMFT  TOPIC: MH EBP Group: [facility] Adult Partial Hospitalization Program  TRACK: PHP2    NUMBER OF PARTICIPANTS: 4    Summary of Group / Topics Discussed:  Education Support Network:  Patients and caregivers received an overview of diagnoses including physical, intellectual, and behavioral indicators of illness. Patients presented information created in the support network development group to engage caregivers in planning for help and support to manage mental health symptoms.  The goal is to help patients and caregivers create a plan for support and assistance after discharge.      Patient Session Goals / Objectives:    Understanding diagnoses and accompanying physical reactions, thoughts, and behaviors.      Explored options to support patients in their recovery     Formulated a plan with caregivers for assistance and support to aid in recovery     Problem solved barriers to follow through on plan      Patient Participation / Response:  Fully participated with the group by sharing personal reflections / insights and openly received / provided feedback with other participants.    Demonstrated understanding of topics discussed through group discussion and participation, Identified / Expressed readiness to act on skill suggestions discussed in topic and Practiced skills in session  No support person present.  Treatment Plan:  Patient has an initial individualized treatment plan that was created as part of their diagnostic assessment / admission process.  A master individualized treatment plan is in the process of being developed with the patient and multi-disciplinary care team.    MANOLO Connolly

## 2023-03-01 NOTE — GROUP NOTE
OT Clinic Group Note    PATIENT'S NAME: Javid Amaya  MRN:   [id number]  :   1970  ACCT. NUMBER: [id number]  DATE OF SERVICE: 3/01/23  START TIME: 11:00 AM  END TIME: 11:50 AM  FACILITATOR: Cyndee Locke OTR/L  TOPIC: [facility] OT Group: Occupational Therapy Clinic  [facility] Partial Hospitalization Program  TRACK: 2    NUMBER OF PARTICIPANTS: 5    Summary of Group / Topics Discussed:  Occupational Therapy Clinic: Provided opportunity for patients to independently choose and engage in a therapeutic activity that supports progress towards their goals and psychiatric recovery. The Occupational Therapy Clinic provides a context for patients to monitor their symptoms, gain self-awareness, practice skills (self-regulation, mindfulness, self-talk, focus/concentration, social, productivity), build a sense of self efficacy and mastery, as well as receive validation, support, and resources. OT checks in, observes, assesses, and monitors performance skills and patterns in context with each group member. Patients were provided orientation to the OT clinic and overview of purpose of the OT clinic in support of meeting their goals.     Patient Session Goals / Objectives:    Independently identify a purposeful and meaningful therapeutic activity.    Identified which goal(s) they are intentionally working on during session.     Reflected on their performance and share insight about their progress.    Practiced and identified a way to generalize a skill to their everyday life      Patient Participation / Response:  Fully participated with the group by sharing personal reflections / insights and openly received / provided feedback with other participants.    Patient presentation: active in reviewing handouts received from groups in session with fair concentration observed, Verbalized understanding of content and Patient would benefit from additional opportunities to practice the content to be able to  generalize it to their everyday life with increased intentionality, consistency, and efficacy in support of their psychiatric recovery    Treatment Plan:  Patient has an initial individualized treatment plan that was created as part of their diagnostic assessment / admission process.  A master individualized treatment plan is in the process of being developed with the patient and multi-disciplinary care team.  Continue to assess.  Will complete OT assessment with Javid in the next couple of treatment days.    Cyndee Locke, OTR/L

## 2023-03-02 ENCOUNTER — HOSPITAL ENCOUNTER (OUTPATIENT)
Dept: BEHAVIORAL HEALTH | Facility: CLINIC | Age: 53
Discharge: HOME | End: 2023-03-02
Attending: PSYCHIATRY & NEUROLOGY
Payer: COMMERCIAL

## 2023-03-02 DIAGNOSIS — F33.2 SEVERE EPISODE OF RECURRENT MAJOR DEPRESSIVE DISORDER, WITHOUT PSYCHOTIC FEATURES (H): Primary | ICD-10-CM

## 2023-03-02 PROCEDURE — H0035 MH PARTIAL HOSP TX UNDER 24H: HCPCS | Performed by: OCCUPATIONAL THERAPIST

## 2023-03-02 PROCEDURE — H0035 MH PARTIAL HOSP TX UNDER 24H: HCPCS

## 2023-03-02 PROCEDURE — H0035 MH PARTIAL HOSP TX UNDER 24H: HCPCS | Performed by: COUNSELOR

## 2023-03-02 NOTE — PROGRESS NOTES
Adult Partial Hospitalization Program:  Individualized Treatment Plan     Date of Plan: 3/2/2023    Name: Javid Amaya MRN: [id number]  :   1970    Programs: Adult Partial Hospitalization Program    DSM5 Primary Diagnosis:  1. Severe episode of recurrent major depressive disorder, without psychotic features (H)  F33.2            Have not ruled out 300.02 (F41.1) Generalized Anxiety Disorder      Adult Partial Hospitalization Program Multidisciplinary Team Members: Finn Ramos LMFT, Glenna [facility],  Izzy Clayton, EVELINA, Thea Parker, LMFT; Cyndee Locke OTR/L; Onelia Le MOTR/L; Cassius Olivarez MD    Javid Amaya will participate in the Adult Partial Hospitalization Program 5 days per week, 5 hours per day. Anticipated duration/discharge: 2 weeks      Program Start Date: 2023  Anticipated Discharge Date: 3/10/2023 (pending authorization/clinical changes)        Javid Amaya would be at reasonable risk of requiring inpatient hospitalization in the absence of partial hospitalization.     Review Date: Does Javid Amaya continue to meet criteria to participate in the Partial Hospitalization Program, 5 days per week; 5 hours per day?   3/2/2023 yes   3/7/2023  3/10/2023 Yes (done 1 day early do to staff coverage)  no discharging as planned       Client Strengths:  caring, has a previous history of therapy, intelligent, open to learning and support of family, friends and providers    Client Areas of Vulnerability:  Suicidal Ideation   Anxiety  Depressive symptoms     Client Participation in Plan:  Contributed to goals and plan   Attended individual treatment plan meeting on 3/2/23, 3/7/2023 and 3/10/2023 for discharge   Agrees with plan   Received copy of treatment plan     Long-Term Goals:  Knowledge about illness and management of symptoms   Maintenance of personal safety     Abuse Prevention Plan:  Safe, therapeutic environment   Safety coping plan as needed   Education  "regarding illness and skill development     Discharge Criteria:  Satisfactory progress toward treatment goals   Improvement re: identified problems and symptoms   Ability to continue recovery at next level of service   Has a discharge plan in place   Has safety/coping plan in place        Areas of Treatment Focus       Why are you seeking treatment/What do you want to focus on during treatment? \"Keep me occupied for a long enough period of time to let the medications kick in.\"  \"Listen in group and apply the things.\"  From Dr. Olivarez's 2/27/23 H&P: Goals for Treatment (in addition to those goals listed in the [facility] Treatment Plan Encounter):    \"I want to talk about medications\"    Generally hoping for return to function      Area of Treatment Focus:  Personal Safety  Start Date:    3/2/2023    Problem Description: (from the 2/24/23 DA)    Safety Assessment:      [place] Suicide Severity Rating Scale (Lifetime/Recent)  [place] Suicide Severity Rating (Lifetime/Recent) 7/22/2022 2/24/2023   1. Wish to be Dead (Lifetime) 0 1   Wish to be Dead Description (Lifetime) - Passive, just doesn't want to wake up   1. Wish to be Dead (Past 1 Month) - 1   Wish to be Dead Description (Past 1 Month) - Passive, just doesn't want to wake up   2. Non-Specific Active Suicidal Thoughts (Lifetime) 0 0   Most Severe Ideation Rating (Lifetime) - 5   Most Severe Ideation Rating (Past 1 Month) - 3   Frequency (Lifetime) - 1   Frequency (Past 1 Month) - 4   Duration (Lifetime) - 1   Duration (Past 1 Month) - 1   Controllability (Lifetime) - 2   Controllability (Past 1 Month) - 2   Deterrents (Lifetime) - 1   Deterrents (Past 1 Month) - 1   Reasons for Ideation (Lifetime) - 5   Reasons for Ideation (Past 1 Month) - 5   Actual Attempt (Lifetime) 0 0   Has subject engaged in non-suicidal self-injurious behavior? (Lifetime) 0 0   Interrupted Attempts (Lifetime) 0 0   Aborted or Self-Interrupted Attempt (Lifetime) 0 0   Preparatory Acts or " "Behavior (Lifetime) 0 0   Calculated C-SSRS Risk Score (Lifetime/Recent) No Risk Indicated Low Risk      Patient denies current homicidal ideation and behaviors.  Patient denies current self-injurious ideation and behaviors.    Patient denied risk behaviors associated with substance use.  Patient denies any high risk behaviors associated with mental health symptoms.  Patient reports the following current concerns for their personal safety: None.  Patient reports there are not firearms in the house.   The firearms are secured in a locked space.     History of Safety Concerns:  Patient denied a history of homicidal ideation.     Patient denied a history of personal safety concerns.    Patient denied a history of assaultive behaviors.    Patient denied a history of sexual assault behaviors.     Patient denied a history of risk behaviors associated with substance use.  Patient denies any history of high risk behaviors associated with mental health symptoms.  Patient reports the following protective factors: dedication to family or friends.     Risk Plan:  See Recommendations for Safety and Risk Management Plan      Goal: Target Date: 3/8/23, discharge Status: Stopped  1) Safety:   Client will notify staff when needing assistance to develop or implement a coping plan to manage suicidal or self injurious urges.  Client will use coping plan for safety, as needed.  .    Progress:  3/2/2023: Met with team members. Discussed program, process, and progress. Discussed and set treatment goals.  Javid reported \"just not wanting to wake up in the morning.\"  He reported feeling depressed and anxious every day, \"I can't control it and I can't sleep.\"  He reported no thoughts of plans or intentions toward suicide since being in [facility].  He denied any other thoughts for injury to himself or others.  When the suicidal thoughts come up he reported \"I think about my family, my wife and I got 3 boys, and what it would do to them.\"  He " "reported he prays to get through the distress.  He goes home and he can't sleep but he prays, then he is almost asleep and it goes to anxiety.  Javid committed to safety and agreed to follow previously developed safety coping plan.  If safety concerns increase, Javid agreed to call 911 or check into the emergency department, if needed to stay safe.  3/7/2023: Met with team members. Discussed program progress.  Javid continues to deny suicidal thoughts or self-injury, and continues to have thoughts of not wanting to wake up in the morning.  His family continues to be the main motivation to keep on living.  He talks to his wife, sons or parents for support.  Javid reports the feeling of not being able to continue living has gotten more difficult because he has decreased hope because he is not seeing improvement.  He reports feeling so exhausted and it's hard to move forward \"I'm so spent.\" He is continues his commitment to following his safety plan for safety.  Goals continued.  3/10/2023 - discharge: Javid reports the program went okay. Symptoms continue to be elevated.  Reports some decrease in feeling like he doesn't want to wake up in the morning overall but thoughts do continue at times.  Has no intent or plan to act on these thoughts.  Uses prayer and his wife for support.  Has a copy of his safety plan if needed.  Goal stopped.     Treatment Strategies:  Assist clients in establishing / strengthening support network  Assist to identify treatment goals  Assist with discharge planning  Engage in safety planning when indicated  Facilitate increased self-awareness  Provide education regarding distress tolerance skills     Area of Treatment Focus:  Symptom Management  Start Date:    3/2/2023    Description: (from the 2/24/23 DA)   Chief Complaint:   The reason for seeking services at this time is: \"depression, anxiety, insomnia\".  The problem(s) began 05/23/22.     Patient has a history of depression and anxiety and is " "currently followed by a psychiatrist at [facility]. 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 Symptoms returned within two months. Patient has been in his current depressive episode since May or last year. He has had multiple medication changes since that time. Symptoms continue to persist.     Patient endorses low mood, low motivation, high levels of anxiety and insomnia. He does not want to get out of bed, he does not want to socialize and has no drive to help out around the house. His son comes in the morning before he goes work to make him exercise and his wife ensures that he is eating. Patient reports strong family/social support and strong amrit. Current symptomology prevents the patient from working. Patient endorses passive suicidal ideation but denies intent plan. He does not have a history of suicide attempts or self injurious behaviors. Patient denies any history of psychiatric hospitalizations. He denies problematic substance use. Patient is med compliant.      Goal: Target Date: 3/8/23, discharge Status: Stopped  2) In Psychotherapy groups Javid will:   Use process group time develop at least 2 skills Javid can practice daily \"target the anxiety.\"     As measured by self report and staff observation during groups daily.       Progress:  3/2/2023: Met with team members. Discussed program, process, and progress. Discussed and set treatment goals.  Javid has a hard time using skills \"I don't have the energy or drive.\"  He reports he is very anxious.  Since he was on Welbutrin his anxiety \"was off the charts\" and he hasn't been able to reduce that level of anxiety since then.  3/7/2023: Met with team members. Discussed program " "progress.  When asked about his anxiety since starting PHP, Javid reports \"It seems like I've been getting worse.\"  He reported the intensity of the anxiety has increased.  He continues to pray to occupy himself, however, he repored \"when my mind stops praying the anxiety rushes in\"  \"I don't get any rest.\"  He is working with Dr. Olivarez to adjust medications to improve sleep and decrease anxiety and depression.  He reports thinking about things he is grateful for helps him to improve his mood.  He continues to have a lack of motivation to do things he previously enjoyed doing, such [place].  He tried exercising and he reported it hasn't helped much.  Goal continued.  3/10/2023 - discharge: Met with Javid to complete his discharge.  Reports he continues to struggle to implement skills on his own so wife and son help him to get active. Able to recognize some small shifts in energy, mood but no large change yet.  Goal stopped.      Treatment Strategies:  Assist clients in establishing / strengthening support network  Assist to identify treatment goals  Assist with discharge planning  Engage in safety planning when indicated  Facilitate increased self awareness  Provide education regarding how to use group process, thoughts/behaviors/emotions management, emotional regulation, values identification, distorted thinking, grief and loss, shame, self compassion, self awareness, mindfulness, radical acceptance, distress tolerance skills, hope, problem solving. Communication/interpersonal effectiveness.      Area of Treatment Focus:  Develop / Improve Independent Living Skills  Start Date:    3/2/2023    Description: (from the 2/24/23 DA)    Functional Status:  Patient reports the following functional impairments:  \"every area of my life.\"         Goal: Target Date: 3/8/2023, discharge Status: Stopped  3) In Occupational Therapy groups Javid will:    Lifestyle health: Learn, practice, apply 2 " "skills/strategies that support participation in meaningful activities with an emphasis on self-care, such daily hygiene and self-care \"taking care of myself,\"  \"showering on a regular basis [daily] and not having to be told what to do\" and consistent routines to improve mental health management after discharge.     Self Regulation: Learn, practice, apply 2 body based self-regulation skills for improved emotional regulation and distress tolerance to support participation in meaningful (roles, routines, relationships and responsibilities), such as \"more exercise,\" Javid has been going on the treadmill 2 to 3 times a week (for 30 minutes) and he would like to be doing if 5 times a week.    As measured by self report and staff observation during groups daily.     Progress:  3/2/2023: Met with team members. Discussed program, process, and progress. Discussed and set treatment goals. Javid reports his wife prompts him to follow through on activities of daily living and other parts of his routine. Javid would like to do \"more exercise.\" Javid has been going on the treadmill 2 to 3 times a week (for 30 minutes) and he would like to be doing if 5 times a week.  His wife prompts him for showering and hygiene and he would like to be able to initiate and follow through with those activities on his own.  3/7/2023: Met with team members. Discussed program progress.  Javid has been exercising less because his son has not been over for support.  He did some physical activity over the weekend but did not exercise.  He has been able to do some hygiene without his wife telling him to do it.  He has been working on mindfulness, to be in the present, as he allows his recovery to continue working. Goals continued.  3/10/2023 - discharge: Met with Javid for discharge.  He continues to report a high level of symptoms with little to no motivation on his own.  Reports his wife and son are helping him get active. Has demonstrated increased " participation in groups both verbally and physically in sessions.  Reports he will continue to work on these after the program. Goal stopped.     Treatment Strategies:  Assist to identify treatment goals  Engage in safety planning when indicated  Facilitate increased self awareness  OT assessment  Provide education regarding:  Lifestyle Health: balance/structure/routine, goal setting and integration, prioritizing and planning, leisure values, behavior activation, weaving a mindful lifestyle and benefits of focused activity,.  Self regulation: sensory modulation, window of tolerance, ANS and vagus nerve activation, self awareness, development of a self regulation plan, sensory enhanced mindfulness, sensory/body based grounding skills.  Resiliency development: Motivation, transitions, energy management, self compassion, stress management, positive thought processes, neuroscience of change.        Area of Treatment Focus:  Community Resources/Discharge Planning  Start Date:    3/2/2023    Description:   Psychiatrist/Med Mgmt: [facility],   Next Visit:  In 2 to 3 weeks, Javid will check into the date/time of the appointment   Individual Therapist: Javid doesn't have an individual therapist, [facility] staff will make a referral for individual therapy services.   Couples therapist: roberto carlos De Santiago,    Next Visit: mid March    Goal: Target Date: 3/8/23, discharge   Status: Completed    4) Wellness and Discharge preparation:     Will improve wellness related behaviors by attending wellness sessions and ask questions as they come up.    Will increase effective use of support / increase ability to ask for help. Invite someone to the Support Network Education group to discuss your support needs.    Will develop an aftercare / transition plan by discharge    Progress:  3/2/2023: Met with team members. Discussed program, process, and progress. Discussed and set treatment goals. Javid is still thinking  about step down group, Adult Day Treatment/ Intensive Outpatient groups, and he will review his decision on 3/8/23. His wife didn't come to network education but she is very experienced with mental health.  3/7/2023: Met with team members. Discussed program progress.  Javid is not interested in a step down group at this time because he is not sure he is in a place to engage with group due to mental illness symptom severity.  PHP staff will make a referral for individual therapy.  [facility] staff will check in with Javid again about interest in other mental health support services at [facility] discharge on 3/10/23. Consider getting involved with the [employer] [facility], www.[facility].org (support groups for Men with depression), for additional support.    Goal continued.  3/10/2023 - discharge: Met with Javid for discharge. Given phone number to call about individual therapy through [employer] [phone] or will look at psychologytoday.com for options.  Has upcoming appointments with his psychiatric provider and couples therapist on 3/14/2023. Javid is not interested in the intensive outpatient program at this time.  Encouraged [facility] and/or [facility] for additional options for support. Goal completed.     Treatment Strategies:  Assist to identify treatment goals  Assist with discharge planning  Engage in safety planning when indicated  Facilitate increased self awareness  Provide education regarding eight dimensions of wellness. sleep hygiene. medication education and management. stigma. nutrition. signs and symptoms. community resources. support network education.      MANOLO Connolly LMFT on 3/7/2023 at 3:42 PM  CIARA Soriano 3/10/2023    NOTE: Signatures are available on the Acknowledgement of Treatment Plan located in Chart Review    The Individualized Treatment Plan Signature Page has been routed to the provider for co-sign.     I have reviewed the patient's Individualized  Treatment Plan and agree with the current goals, interventions and level of care.     Cassius Olivarez MD  3/7/2023

## 2023-03-02 NOTE — GROUP NOTE
Psychoeducation Group Note    PATIENT'S NAME: Javid Amaya  MRN:   [id number]  :   1970  ACCT. NUMBER: [id number]  DATE OF SERVICE: 3/02/23  START TIME: 11:00 AM  END TIME: 11:50 AM  FACILITATOR: Param Barrios RN  TOPIC:  Wellness Group: [facility] Adult Partial Hospitalization Program  TRACK: PHP2    NUMBER OF PARTICIPANTS: 6    Summary of Group / Topics Discussed: pt 2    Discussed how light, temperature can impact sleep     Patient Session Goals / Objectives:  ? Described the differences in light sources and how the body responds to it  ? Described how the body responds to changes in temperature and their impacts on sleep  ? Discussed ways light can be used to manage mental health and their impacts on shelli/mood regulation      Patient Participation / Response:  Fully participated with the group by sharing personal reflections / insights and openly received / provided feedback with other participants.    Demonstrated understanding of topics discussed through group discussion and participation    Treatment Plan:  Patient has a current master individualized treatment plan.  See Epic treatment plan for more information.    Param Barrios RN

## 2023-03-02 NOTE — GROUP NOTE
Psychotherapy Group Note    PATIENT'S NAME: Javid Amaya  MRN:   [id number]  :   1970  ACCT. NUMBER: [id number]  DATE OF SERVICE: 3/02/23  START TIME:  2:00 PM  END TIME:  2:50 PM  FACILITATOR: Finn Ramos LMFT  TOPIC:  EBP Group: [facility] Adult Partial Hospitalization Program  TRACK: PHP2    NUMBER OF PARTICIPANTS: 6    Summary of Group / Topics Discussed:  Specialty Topics: Hope: The topic of hope was presented in order to help patients better understand the symptoms of hopelessness and how to become more hopeful. Patients discussed their current awareness of the topic and relevance to their functioning. Individual experiences with symptoms and treatment options were also discussed. Patients explored options for ongoing/future treatment and symptom management.      Patient Session Goals / Objectives:    Discussed definition of hopelessness    Discussed how hopelessness impacts functioning    Set a plan to utilize skills to reduce hopelessness      Patient Participation / Response:  Moderately participated, sharing some personal reflections / insights and adequately adequately received / provided feedback with other participants.  Javid reported he does not see hope for his recovery right now because it is so difficult, however, he reported prayer helps him get through difficult times.  Demonstrated understanding of topics discussed through group discussion and participation, Verbalized understanding of ways to proactively manage illness and Practiced skills in session    Treatment Plan:  Patient has a current master individualized treatment plan.  See Epic treatment plan for more information.    MANOLO Connolly

## 2023-03-02 NOTE — GROUP NOTE
Psychoeducation Group Note    PATIENT'S NAME: Javid Amaya  MRN:   [id number]  :   1970  ACCT. NUMBER: [id number]  DATE OF SERVICE: 3/02/23  START TIME: 10:00 AM  END TIME: 10:50 AM  FACILITATOR: Cyndee Locke OTR/L  TOPIC: MH PHP OT Group: Partial Hospitalization Program- Occupational Therapy  [facility] Partial Hospitalization Program  TRACK: 2    NUMBER OF PARTICIPANTS: 6    Summary of Group / Topics Discussed:  Coping skills: Opposite to Emotion and Building Interoceptive Awareness: Patients discussed past and present struggles with knowing how to make changes in their lives due to difficult emotional experiences and high level of mental health symptoms.  Reviewed the therapeutic skill of opposite action and patients explored opportunities to use their behaviors as a tool to reduce an emotion and help engage in tasks/behaviors that they want to change.   Discussed small steps that can be taken to break opposite action skill into manageable pieces.      Patient Session Goals / Objectives:    Review DBT concepts and focus on patient's experiences of distress and difficult emotional experiences.    Learn how to do the opposite of what an emotion makes us want to do in an effort to decrease an unwanted emotional experience.    Demonstrate understanding of the skill of opposite action by sharing experiences where the technique could be useful in past / present situations.    Applied concept to everyday life and occupations, planning and problem solving as needed              Patient Participation / Response:  Moderately participated.    Patient presentation: quiet in session overall; reports struggling with low motivation, low mood so finding skill use difficult right now and Patient would benefit from additional opportunities to practice the content to be able to generalize it to their everyday life with increased intentionality, consistency, and efficacy in support of their psychiatric  recovery    Treatment Plan:  Patient has a current master individualized treatment plan.  See Epic treatment plan for more information.    Cyndee Locke, OTR/L

## 2023-03-03 ENCOUNTER — HOSPITAL ENCOUNTER (OUTPATIENT)
Dept: BEHAVIORAL HEALTH | Facility: CLINIC | Age: 53
Discharge: HOME | End: 2023-03-03
Attending: PSYCHIATRY & NEUROLOGY
Payer: COMMERCIAL

## 2023-03-03 PROCEDURE — H0035 MH PARTIAL HOSP TX UNDER 24H: HCPCS | Performed by: OCCUPATIONAL THERAPIST

## 2023-03-03 PROCEDURE — H0035 MH PARTIAL HOSP TX UNDER 24H: HCPCS | Performed by: COUNSELOR

## 2023-03-03 PROCEDURE — H0035 MH PARTIAL HOSP TX UNDER 24H: HCPCS | Performed by: SOCIAL WORKER

## 2023-03-03 PROCEDURE — H0035 MH PARTIAL HOSP TX UNDER 24H: HCPCS

## 2023-03-03 NOTE — GROUP NOTE
Psychoeducation Group Note    PATIENT'S NAME: Javid Amaya  MRN:   [id number]  :   1970  ACCT. NUMBER: [id number]  DATE OF SERVICE: 3/03/23  START TIME: 11:00 AM  END TIME: 11:50 AM  FACILITATOR: Cyndee Locke OTR/L  TOPIC:  PHP OT Group: Lifestyle Balance and Structure  [facility] Adult Partial Hospitalization Program  TRACK: 2    NUMBER OF PARTICIPANTS: 7    Summary of Group / Topics Discussed:  Lifestyle Balance and Structure:  OT Goal-setting & integration: Weekend Wellness: The group focused on reflecting on the past week and identifying insights and positive experiences that they want to build upon further.  The group also focused on identifying needs and any concerns for the upcoming weekend and created a list of activities and tasks that they might engage in to help support themselves and add structure their weekend.  Facilitated the sharing of individual insights and plans with validation, support, and feedback provided.    Patient Session Goals / Objectives:    Reflected on insights learned and positive experiences over the last week to help with their recovery and wellbeing    Identified needs and concerns for the upcoming weekend and identified ways to address these    Created a written list of possible ways to structure their weekend    Identified plan to support follow through on plans and reflection on progress made       Patient Participation / Response:  Fully participated with the group by sharing personal reflections / insights and openly received / provided feedback with other participants.    Patient presentation: able to identify plans for the weekend with his wife and son; reports no concerns and will take breaks as needed; overall quiet but attentive to discussion, Verbalized understanding of content and Patient would benefit from additional opportunities to practice the content to be able to generalize it to their everyday life with increased intentionality, consistency,  and efficacy in support of their psychiatric recovery    Treatment Plan:  Patient has a current master individualized treatment plan.  See Epic treatment plan for more information.    Cyndee Locke OTR/L

## 2023-03-03 NOTE — PROGRESS NOTES
Acknowledgement of Current Treatment Plan       I have reviewed my treatment plan with my therapist / counselor on 3/2/23.   I agree with the plan as it is written in the electronic health record.    Name:      Signature:  Javid Olivarez MD  Psychiatrist/Medical Director    Finn Ramos MA, LMFT

## 2023-03-03 NOTE — GROUP NOTE
Psychotherapy Group Note    PATIENT'S NAME: Javid Amaya  MRN:   [id number]  :   1970  ACCT. NUMBER: [id number]  DATE OF SERVICE: 3/03/23  START TIME:  2:00 PM  END TIME:  2:50 PM  FACILITATOR: Glenna Mathew LICSW  TOPIC:  EBP Group: Enhanced Mindfulness  [facility] Adult Partial Hospitalization Program  TRACK: 2    NUMBER OF PARTICIPANTS: 6    Summary of Group / Topics Discussed:  Enhanced Mindfulness: Body and Mind Integration: Patients received an overview and education regarding the importance of including the body in the management of emotional health and self-care and as a direct route to mindfulness practice.  Patients discussed various ways in which the body can serve as an informant to their physical and emotional experiences/need. Patients discussed the body as a direct link to the present moment and to mindfulness practice.  Patients discussed current relationship with body, self-awareness, mindfulness practice and barriers to connection with body.  Patients were guided through breath work and movement to facilitate greater self-awareness, grounding, self-expression, and connection to other.  Patients discussed how the experiential could be applied to better manage mental health and develop arriaza connection to self.    Patient Session Goals / Objectives:    Identify how movement awareness could be used for grounding, stress management, self-expression, connection to other and self-regulation    Improved awareness of breath and movement preferences    Identify how movement and the body is used in mindfulness practice    Reflect on use of these practices in everyday life.    Identify barriers to attending to body                                        Service Modality:  Video Visit         Telemedicine Visit: The patient's condition can be safely assessed and treated via synchronous audio and visual telemedicine encounter.          Reason for Telemedicine Visit: Services only offered  telehealth        Originating Site (Patient Location): Patient's place of employment        Distant Site (Provider Location): Provider Remote Setting- Home Office        Consent:  The patient/guardian has verbally consented to: the potential risks and benefits of telemedicine (video visit) versus in person care; bill my insurance or make self-payment for services provided; and responsibility for payment of non-covered services.         Patient would like the video invitation sent by:  My Chart        Mode of Communication:  Video Conference via Medical Zoom        As the provider I attest to compliance with applicable laws and regulations related to telemedicine.                                                  Patient Participation / Response:  Moderately participated, sharing some personal reflections / insights and adequately adequately received / provided feedback with other participants.    Practiced skills in session    Treatment Plan:  Patient has a current master individualized treatment plan.  See Epic treatment plan for more information.    NASREEN VillafuerteSW

## 2023-03-03 NOTE — GROUP NOTE
Psychoeducation Group Note    PATIENT'S NAME: Javid Amaya  MRN:   [id number]  :   1970  ACCT. NUMBER: [id number]  DATE OF SERVICE: 3/03/23  START TIME: 10:00 AM  END TIME: 10:50 AM  FACILITATOR: Irina Fields RN  TOPIC:  Wellness Group: Medication Education and Management  [facility] Partial Hospitalization Program  TRACK: 2    NUMBER OF PARTICIPANTS: 6    Summary of Group / Topics Discussed:  Medication Educations and Management:  Medication Jeopardy: Patients provided education regarding medication safety, antidepressants, side effects, neuroleptics, expected medication outcomes, knowledge of diagnosis, symptoms, and symptom management through an engaging jeopardy-style format.     Patient Session Goals / Objectives:    ? Participated in team-based Jeopardy game  ? Identified strategies for safe use, handling, and disposal of medications  ? Discussed basic aspects of medication safety, side effects, adverse outcomes and contraindications      Patient Participation / Response:  Moderately participated, sharing some personal reflections / insights and adequately adequately received / provided feedback with other participants.     Verbalized understanding of medication education and management topic    Treatment Plan:  Patient has a current master individualized treatment plan.  See Epic treatment plan for more information.    Irina Fields RN

## 2023-03-03 NOTE — GROUP NOTE
Psychotherapy Group Note    PATIENT'S NAME: Javid Amaya  MRN:   [id number]  :   1970  ACCT. NUMBER: [id number]  DATE OF SERVICE: 3/02/23  START TIME:  1:00 PM  END TIME:  1:50 PM  FACILITATOR: Finn Ramos LMFT  TOPIC:  EBP Group: Emotions Management  [facility] Adult Partial Hospitalization Program  TRACK: PHP2    NUMBER OF PARTICIPANTS: 6    Summary of Group / Topics Discussed:  Emotions Management: Guilt and Shame: Patients explored and shared personal experiences associated with feelings of guilt and shame.  Group explored how these feelings develop, what they mean to each individual, and how to increase acceptance and usefulness of these feelings.  Group members assisted one another to contextualize these concepts and promote healing.     Patient Session Goals / Objectives:    Discuss and review definitions and personal views/experiences with guilt and shame    Understand the differences between guilt and shame    Explore how feelings of guilt and shame impact functioning    Understand and practice strategies to manage difficult emotions and move towards healing    Understand and normalize difficult emotions through group discussion    Understand the utility of guilt and shame    Target  unwanted  emotions for change      Patient Participation / Response:  Moderately participated, sharing some personal reflections / insights and adequately adequately received / provided feedback with other participants.    Demonstrated understanding of topics discussed through group discussion and participation    Treatment Plan:  Patient has a current master individualized treatment plan.  See Epic treatment plan for more information.    MANOLO Connolly

## 2023-03-03 NOTE — GROUP NOTE
Process Group Note    PATIENT'S NAME: Javid Amaya  MRN:   [id number]  :   1970  ACCT. NUMBER: [id number]  DATE OF SERVICE: 3/02/23  START TIME:  9:00 AM  END TIME:  9:50 AM  FACILITATOR: Finn Ramos LMFT  TOPIC:  Process Group    Diagnoses:  1. Severe episode of recurrent major depressive disorder, without psychotic features (H)  F33.2            Have not ruled out 300.02 (F41.1) Generalized Anxiety Disorder      [facility] Adult Partial Hospitalization Program  TRACK: PHP2    NUMBER OF PARTICIPANTS: 6          Data:    Session content: At the start of this group, patients were invited to check in by identifying themselves, describing their current emotional status, and identifying issues to address in this group.   Area(s) of treatment focus addressed in this session included Symptom Management, Personal Safety and Community Resources/Discharge Planning.  Patient reported feeling really down, depressed, anxious and tired.   Patient discussed working toward  try to have a better attitude, think more positively and make it through the day.    For skills they will use to address their goal(s), patient identified breathing exercises, and  try to focus on something other than depression and anxiety.    A barrier to working toward their goal(s) and/or addressing mental health symptoms the patient identified was  my own mind  and medication not working.  Patient reported no safety concerns and/or self-injurious behaviors. Patient reported no substance use. Patient reported they are taking their medications as prescribed.   Patient reported feeling proud/grateful for his family.    Therapeutic Interventions/Treatment Strategies:  Psychotherapist offered support, feedback and validation. Treatment modalities used include Cognitive Behavioral Therapy. Interventions include Coping Skills: Facilitated understanding of  what factors may contribute to symptom relapse and skills plan to manage symptom  relapse  and Symptoms Management: Promoted understanding of their diagnoses and how it impacts their functioning.    Assessment:    Patient response:   Patient responded to session by accepting feedback, listening, focusing on goals, being attentive and accepting support    Possible barriers to participation / learning include: Severity of symptoms    Health Issues:   None reported       Substance Use Review:   Substance Use: No active concerns identified.    Mental Status/Behavioral Observations  Appearance:   Appropriate   Eye Contact:   Reduced  Psychomotor Behavior: Slowed  Attitude:   Cooperative   Orientation:   All  Speech   Rate / Production: Normal    Volume:  Normal   Mood:    Depressed Anxious  Affect:    Subdued   Thought Content:   Clear and Safety denies any current safety concerns including suicidal ideation, self-harm, and homicidal ideation  Thought Form:  Coherent  Logical     Insight:    Good     Plan:     Safety Plan: No current safety concerns identified.  Recommended that patient call 911 or go to the local ED should there be a change in any of these risk factors.     Barriers to treatment: None identified    Patient Contracts (see media tab):  None    Substance Use: Provided encouragement towards sobriety     Continue or Discharge: Patient will continue in Adult Partial Hospitalization Program (PHP)  as planned. Patient is likely to benefit from learning and using skills as they work toward the goals identified in their treatment plan.      Finn Ramos, MANOLO  March 3, 2023

## 2023-03-06 ENCOUNTER — HOSPITAL ENCOUNTER (OUTPATIENT)
Dept: BEHAVIORAL HEALTH | Facility: CLINIC | Age: 53
Discharge: HOME | End: 2023-03-06
Attending: PSYCHIATRY & NEUROLOGY
Payer: COMMERCIAL

## 2023-03-06 DIAGNOSIS — F33.2 SEVERE EPISODE OF RECURRENT MAJOR DEPRESSIVE DISORDER, WITHOUT PSYCHOTIC FEATURES (H): Primary | ICD-10-CM

## 2023-03-06 PROCEDURE — H0035 MH PARTIAL HOSP TX UNDER 24H: HCPCS | Performed by: COUNSELOR

## 2023-03-06 PROCEDURE — H0035 MH PARTIAL HOSP TX UNDER 24H: HCPCS

## 2023-03-06 PROCEDURE — H0035 MH PARTIAL HOSP TX UNDER 24H: HCPCS | Performed by: OCCUPATIONAL THERAPIST

## 2023-03-06 NOTE — GROUP NOTE
Psychotherapy Group Note    PATIENT'S NAME: Javid Amaya  MRN:   [id number]  :   1970  ACCT. NUMBER: [id number]  DATE OF SERVICE: 3/06/23  START TIME:  2:00 PM  END TIME:  2:50 PM  FACILITATOR: Janel Andujar LPCC  TOPIC:  EBP Group: Emotions Management  [facility] Adult Partial Hospitalization Program  TRACK: PHP2    NUMBER OF PARTICIPANTS: 6    Summary of Group / Topics Discussed:  Emotions Management: Model of Emotions: Patients were introduced to the cyclical model of emotions.  Explored emotions are shaped by different events and one s interpretation of events.  Group discussed how emotions begin with an event, followed by one s interpretation, followed by associated feelings.  Discussion included a review of personal urges and actions that can/do follow an emotional experience in the patient s life, and the end results.    Patient Session Goals / Objectives:    Demonstrate understanding of types various emotions.    Identify and discuss specific emotions and when they occur; understand triggers.    Identify individual emotions and physical sensations that accompany them.    Discuss urges and actions, and how to influence the intensity of emotional reactions and disrupt the cycle.      Discuss barriers to emotional regulation.    Choose 1-2 strategies to assist with emotional response to potentially distressing situations.      Patient Participation / Response:  Minimally participated, only when prompted / asked.    Demonstrated understanding of topics discussed through group discussion and participation and Expressed understanding of the relevance / importance of emotions management skills at distressing times in life    Treatment Plan:  Patient has a current master individualized treatment plan.  See Epic treatment plan for more information.    SARAH Dodd

## 2023-03-06 NOTE — GROUP NOTE
Psychoeducation Group Note    PATIENT'S NAME: Javid Amaya  MRN:   [id number]  :   1970  ACCT. NUMBER: [id number]  DATE OF SERVICE: 3/06/23  START TIME:  1:00 PM  END TIME:  1:50 PM  FACILITATOR: Kallie Clayton RN  TOPIC:  Wellness Group: Mental Health Maintenance  [facility] Adult Partial Hospitalization Program  TRACK: [facility] 2    NUMBER OF PARTICIPANTS: 7    Summary of Group / Topics Discussed:  Mental Health Maintenance:  Assessments of Strengths: Patients completed a self-reflection on personal strengths worksheet. The concept of personal strength as it relates to resilience were explored. Patients shared responses with the group and participated in discussion.     Patient Session Goals / Objectives:  ? Patients identified 1-3 qualities they consider a personal strength.  ? Patients understood the concept of personal strengths and the connection it has to resiliency          Patient Participation / Response:  Fully participated with the group by sharing personal reflections / insights and openly received / provided feedback with other participants.    Identified / Expressed personal readiness to practice skills    Treatment Plan:  Patient has a current master individualized treatment plan.  See Epic treatment plan for more information.    Kallie Clayton RN

## 2023-03-06 NOTE — GROUP NOTE
Process Group Note    PATIENT'S NAME: Javid Amaya  MRN:   [id number]  :   1970  ACCT. NUMBER: [id number]  DATE OF SERVICE: 3/06/23  START TIME:  9:00 AM  END TIME: 10:50 AM  FACILITATOR: Janel Andujar LPCC  TOPIC:  Process Group    Diagnoses:    Severe episode of recurrent major depressive disorder, without psychotic features (H)   F33.2      [facility] Adult Partial Hospitalization Program  TRACK: PHP2    NUMBER OF PARTICIPANTS: PHP2          Data:    Session content: At the start of this group, patients were invited to check in by identifying themselves, describing their current emotional status, and identifying issues to address in this group.   Area(s) of treatment focus addressed in this session included Symptom Management, Personal Safety, Develop / Improve Independent Living Skills and Develop Socialization / Interpersonal Relationship Skills.    Patient reported feeling down, depressed.  Patient discussed working toward 'seeing what I can gain from the classes to help deal with it.'  Skills they plan to practice to address the goal include breathing, prayer.  They identified a potential barrier as 'my own brain.'  Patient reported no safety concerns or SIB.  Patient reported no substance use.  Patient reported taking medications as prescribed.  Patient reported feeling proud/grateful for family.        Therapeutic Interventions/Treatment Strategies:  Psychotherapist offered support, feedback and validation and reinforced use of skills. Treatment modalities used include Motivational Interviewing and Dialectical Behavioral Therapy. Interventions include Behavioral Activation: Explored how behaviors effect mood and interact with thoughts and feelings and Encouraged strategies to reduce individual procrastination and increase motivation by increasing goal-directed activities to enhance mood and reduce symptoms., Coping Skills: Assisted patient in understanding the purpose of planning  / creating / participating / sharing in positive experiences and Symptoms Management: Promoted understanding of their diagnoses and how it impacts their functioning.    Assessment:    Patient response:   Patient responded to session by accepting feedback, listening, being attentive and verbalizing understanding    Possible barriers to participation / learning include: and no barriers identified    Health Issues:   None reported       Substance Use Review:   Substance Use: No active concerns identified.    Mental Status/Behavioral Observations  Appearance:   Appropriate   Eye Contact:   Good   Psychomotor Behavior: Retarded (Slowed)   Attitude:   Cooperative   Orientation:   All  Speech   Rate / Production: Monotone    Volume:  Normal   Mood:    Anxious  Depressed   Affect:    Flat   Thought Content:   Clear  Thought Form:  Coherent  Logical     Insight:    Good     Plan:     Safety Plan: No current safety concerns identified.  Recommended that patient call 911 or go to the local ED should there be a change in any of these risk factors.     Barriers to treatment: None identified    Patient Contracts (see media tab):  None    Substance Use: Not addressed in session     Continue or Discharge: Patient will continue in Adult Partial Hospitalization Program (PHP)  as planned. Patient is likely to benefit from learning and using skills as they work toward the goals identified in their treatment plan.      SARAH Dodd  March 6, 2023

## 2023-03-06 NOTE — GROUP NOTE
Psychoeducation Group Note    PATIENT'S NAME: Javid Amaya  MRN:   [id number]  :   1970  ACCT. NUMBER: [id number]  DATE OF SERVICE: 3/03/23  START TIME:  1:00 PM  END TIME:  1:50 PM  FACILITATOR: Finn Ramos LMFT  TOPIC:  Wellness Group: Mental Health Maintenance  [facility] Partial Hospitalization Program  TRACK: PHP2    NUMBER OF PARTICIPANTS: 7    Summary of Group / Topics Discussed:  Mental Health Maintenance:  Vulnerability: In this group, the concept of vulnerability was explored through the viewing, discussion, and self-reflection of the Edith Lamb Talk Titled,  The Power of Vulnerability.      Patient Session Goals / Objectives:  ? Defined and described definition of vulnerability   ? Identified 2 or more ways of practicing authenticity         Patient Participation / Response:  Moderately participated, sharing some personal reflections / insights and adequately adequately received / provided feedback with other participants.    Demonstrated understanding of topics discussed through group discussion and participation    Treatment Plan:  Patient has a current master individualized treatment plan.  See Epic treatment plan for more information.    MANOLO Connolly

## 2023-03-06 NOTE — GROUP NOTE
Process Group Note    PATIENT'S NAME: Javid Amaya  MRN:   [id number]  :   1970  ACCT. NUMBER: [id number]  DATE OF SERVICE: 3/03/23  START TIME:  9:00 AM  END TIME:  9:50 AM  FACILITATOR: Finn Ramos LMFT  TOPIC:  Process Group    Diagnoses:  1. Severe episode of recurrent major depressive disorder, without psychotic features (H)  F33.2            Have not ruled out 300.02 (F41.1) Generalized Anxiety Disorder      [facility] Partial Hospitalization Program  TRACK: PHP2    NUMBER OF PARTICIPANTS: 6          Data:    Session content: At the start of this group, patients were invited to check in by identifying themselves, describing their current emotional status, and identifying issues to address in this group.   Area(s) of treatment focus addressed in this session included Symptom Management, Personal Safety and Community Resources/Discharge Planning.  Patient reported feeling down, depressed and anxious.   Patient discussed working toward seeing what he can get from the group.   For skills they will use to address their goal(s), patient identified breathing and praying.   A barrier to working toward their goal(s) and/or addressing mental health symptoms the patient identified was  the exhaustion and depression.   Patient reported no safety concerns and/or self-injurious behaviors. Patient reported no substance use. Patient reported they are taking their medications as prescribed.   Patient reported feeling proud/grateful for family.    Therapeutic Interventions/Treatment Strategies:  Psychotherapist offered support, feedback and validation. Treatment modalities used include Cognitive Behavioral Therapy. Interventions include Coping Skills: Facilitated understanding of  what factors may contribute to symptom relapse and skills plan to manage symptom relapse  and Symptoms Management: Promoted understanding of their diagnoses and how it impacts their  functioning.    Assessment:    Patient response:   Patient responded to session by accepting feedback, listening, focusing on goals, being attentive and accepting support    Possible barriers to participation / learning include: Severity of Symptoms  Health Issues:   None reported       Substance Use Review:   Substance Use: No active concerns identified.    Mental Status/Behavioral Observations  Appearance:   Appropriate   Eye Contact:   Good   Psychomotor Behavior: Normal   Attitude:   Cooperative   Orientation:   All  Speech   Rate / Production: Normal    Volume:  Normal   Mood:    Depressed Anxious  Affect:    Subdued  Thought Content:   Clear and Safety denies any current safety concerns including suicidal ideation, self-harm, and homicidal ideation  Thought Form:  Coherent  Logical     Insight:    Good     Plan:     Safety Plan: No current safety concerns identified.      Barriers to treatment: None identified    Patient Contracts (see media tab):  None    Substance Use: Provided encouragement towards sobriety     Continue or Discharge: Patient will continue in Adult Partial Hospitalization Program (PHP)  as planned. Patient is likely to benefit from learning and using skills as they work toward the goals identified in their treatment plan.    Finn Ramos, MANOLO  March 5, 2023

## 2023-03-07 ENCOUNTER — HOSPITAL ENCOUNTER (OUTPATIENT)
Dept: BEHAVIORAL HEALTH | Facility: CLINIC | Age: 53
Discharge: HOME | End: 2023-03-07
Attending: PSYCHIATRY & NEUROLOGY
Payer: COMMERCIAL

## 2023-03-07 VITALS — HEART RATE: 75 BPM | DIASTOLIC BLOOD PRESSURE: 81 MMHG | SYSTOLIC BLOOD PRESSURE: 123 MMHG | OXYGEN SATURATION: 98 %

## 2023-03-07 DIAGNOSIS — F33.2 SEVERE EPISODE OF RECURRENT MAJOR DEPRESSIVE DISORDER, WITHOUT PSYCHOTIC FEATURES (H): Primary | ICD-10-CM

## 2023-03-07 PROCEDURE — H0035 MH PARTIAL HOSP TX UNDER 24H: HCPCS | Performed by: COUNSELOR

## 2023-03-07 PROCEDURE — 99215 OFFICE O/P EST HI 40 MIN: CPT | Performed by: PSYCHIATRY & NEUROLOGY

## 2023-03-07 PROCEDURE — H0035 MH PARTIAL HOSP TX UNDER 24H: HCPCS | Performed by: OCCUPATIONAL THERAPIST

## 2023-03-07 RX ORDER — QUETIAPINE FUMARATE 50 MG/1
50 TABLET, FILM COATED ORAL AT BEDTIME
Qty: 15 TABLET | Refills: 0 | Status: SHIPPED | OUTPATIENT
Start: 2023-03-07 | End: 2023-03-10

## 2023-03-07 RX ORDER — MIRTAZAPINE 15 MG/1
15 TABLET, FILM COATED ORAL AT BEDTIME
Qty: 30 TABLET | Refills: 0 | Status: SHIPPED | OUTPATIENT
Start: 2023-03-07 | End: 2024-07-03 | Stop reason: ALTCHOICE

## 2023-03-07 RX ORDER — DESVENLAFAXINE 50 MG/1
50 TABLET, FILM COATED, EXTENDED RELEASE ORAL DAILY
Qty: 30 TABLET | Refills: 0 | Status: SHIPPED | OUTPATIENT
Start: 2023-03-07

## 2023-03-07 NOTE — GROUP NOTE
OT Clinic Group Note    PATIENT'S NAME: Javdi Amaya  MRN:   [id number]  :   1970  ACCT. NUMBER: [id number]  DATE OF SERVICE: 3/07/23  START TIME: 11:00 AM  END TIME: 11:50 AM  FACILITATOR: Cyndee Locke OTR/L  TOPIC: [facility] OT Group: Occupational Therapy Clinic  [facility] Adult Partial Hospitalization Program  TRACK: 2    NUMBER OF PARTICIPANTS: 6    Summary of Group / Topics Discussed:  Occupational Therapy Clinic: Provided opportunity for patients to independently choose and engage in a therapeutic activity that supports progress towards their goals and psychiatric recovery. The Occupational Therapy Clinic provides a context for patients to monitor their symptoms, gain self-awareness, practice skills (self-regulation, mindfulness, self-talk, focus/concentration, social, productivity), build a sense of self efficacy and mastery, as well as receive validation, support, and resources. OT checks in, observes, assesses, and monitors performance skills and patterns in context with each group member. New patients completed the Occupational Self Assessment (RAFI) in which identified functional areas their mental health status is impacting and which are most important for them to work on while in the Partial Hospitalization Program. Patients were provided orientation to the OT clinic and overview of purpose of the OT clinic in support of meeting their goals.  Discussed importance of cultivating positive experiences and activities to help challenge negative self talk and negative core beliefs.      Patient Session Goals / Objectives:    Independently identify a purposeful and meaningful therapeutic activity.    Identified which goal(s) they are intentionally working on during session.     Reflected on their performance and share insight about their progress.    Practiced and identified a way to generalize a skill to their everyday life      Patient Participation / Response:  Fully participated with the  group by sharing personal reflections / insights and openly received / provided feedback with other participants.    Patient presentation: spent time in group reviewing handouts from groups especially radical acceptance and negative core beliefs; energy level and mood observed to be low, Verbalized understanding of content and Patient would benefit from additional opportunities to practice the content to be able to generalize it to their everyday life with increased intentionality, consistency, and efficacy in support of their psychiatric recovery    Treatment Plan:  Patient has a current master individualized treatment plan and today was our weekly review of the patient's progress.  See Epic treatment plan for progress / updates on goals and plan.    Cyndee Locke, OTR/L

## 2023-03-07 NOTE — PROGRESS NOTES
Acknowledgement of Current Treatment Plan       I have reviewed my treatment plan with my therapist / counselor on 3/7/23.   I agree with the plan as it is written in the electronic health record.    Name:      Signature:  Javid Olivarez MD  Psychiatrist/Medical Director    Finn Ramos MA, LMFT

## 2023-03-07 NOTE — GROUP NOTE
Psychoeducation Group Note    PATIENT'S NAME: Javid Amaya  MRN:   [id number]  :   1970  ACCT. NUMBER: [id number]  DATE OF SERVICE: 3/06/23  START TIME: 11:00 AM  END TIME: 11:50 AM  FACILITATOR: Cyndee Locke OTR/L  TOPIC: [facility] OT Group: Lifestyle Balance and Structure  [facility] Adult Partial Hospitalization Program  TRACK: 2    NUMBER OF PARTICIPANTS: 7    Summary of Group / Topics Discussed:  Lifestyle Balance and Structure:  Leisure Values: Provided psychoeducation and discussion on benefits of leisure on stress management, parasympathetic NS activation, and wellness. Facilitated a structured self-reflective process where patients identified their leisure values: what is most important to them with regards to how they spend their time and energy on that promotes lifestyle balance to support mental wellbeing. Experiential process facilitated where patients reflected on past, present, and potential future leisure activities that fulfill their leisure values. Validation and support provided.    Patient Session Goals / Objectives:    [place] the mechanisms and benefits of leisure activity to create lifestyle balance and improve mental health.     Identified their leisure values (how they want to spend their time and energy)    Identified past, present, and future leisure activities that demonstrate a connection to their leisure values    Identify first step to engaging in a new or old leisure activity again and problem solve barriers.         Patient Participation / Response:  Fully participated with the group by sharing personal reflections / insights and openly received / provided feedback with other participants.    Patient presentation: active in group discussion sharing his difficulties with leisure engagement with peers; does find spending time outdoors and with his wife to be meaningful, Verbalized understanding of content and Patient would benefit from additional opportunities to  practice the content to be able to generalize it to their everyday life with increased intentionality, consistency, and efficacy in support of their psychiatric recovery    Treatment Plan:  Patient has a current master individualized treatment plan.  See Epic treatment plan for more information.    Cyndee Locke, OTR/L

## 2023-03-07 NOTE — PROGRESS NOTES
"[facility] Mental Health Outpatient Programs  Provider Interval History Note    Program (track): Partial Hospital Program (track 2, in-person)    PATIENT'S NAME: Javid Amaya  MRN:   [id number]  :   1970  ACCT. NUMBER: [id number]  DATE OF SERVICE: 3/07/23  CALL/VIDEO START TIME: 1004  CALL/VIDEO END TIME: 1045      Interval History:  \"I'm not doing well.\" Javid presents today for follow-up and ongoing program supervision.   Endorses:    Discontinued venlafaxine as discussed earlier last week    Increased mirtazapine to 15 mg at bedtime on Friday    Not noting significantly improved sleep as a result  o Still falling asleep without difficulty  o Still waking during the night and sleeping restlessly/not at all for the rest of the night    Has been discussing with spouse whether this will be permanent, and if so, how he might learn to live with it    Endorses he is struggling to put skills or other coping into practice on a day-to-day basis    Anxiety remains high even after stopping bupropion and higher dose of venlafaxine  o When taking bupropion as monotherapy, rumination was in nearly constant phenomenon  o Now more episodic    Notes ongoing tinnitus since stopping bupropion, though patient is unsure if this was related to stopping bupropion or starting high-dose venlafaxine immediately after    Noting changes in vision, specifically decrease in distance vision since starting olanzapine and persisting even after he stopped it  o When taking olanzapine was sleeping more soundly but had difficulty waking in the morning    Substance use:    Denies    Reactions/thoughts about program:    \"It's getting me out of the house every day\"    \"The tools are beneficial but I have a heck of a time implementing them\"      Risk Assessment:    Suicidal ideation: denies current or recent suicidal ideation or behavior    Thoughts of non-suicidal self-injury: denied    Recent " self-injurious behavior: denied    Homicidal ideation: denied    Other safety concerns: denied      Medications:  Current Outpatient Medications   Medication Sig Dispense Refill     desvenlafaxine (PRISTIQ) 50 MG 24 hr tablet Take 1 tablet (50 mg) by mouth daily 30 tablet 0     mirtazapine (REMERON) 15 MG tablet Take 1 tablet (15 mg) by mouth At Bedtime 30 tablet 0     QUEtiapine (SEROQUEL) 50 MG tablet Take 1 tablet (50 mg) by mouth At Bedtime 15 tablet 0     fish oil-omega-3 fatty acids 1000 MG capsule Take 2 g by mouth daily       hydrOXYzine (ATARAX) 25 MG tablet Take 0.5-2 tablets (12.5-50 mg) by mouth 3 times daily as needed for anxiety 60 tablet 2     Turmeric (CURCUMIN 95) 500 MG CAPS        valerian root 530 MG CAPS capsule        vitamin C with B complex (B COMPLEX-C) tablet Take 1 tablet by mouth daily       Vitamin D (Cholecalciferol) 25 MCG (1000 UT) TABS          The above list was reviewed and updated in EPIC with patient today.     Patient is taking medications as prescribed and denies adverse effects      Laboratory Results:  Most recent labs reviewed. Pertinent updates/findings: None.     Metrics:  PHQ-9 scores:   PHQ-9 SCORE 7/5/2022 7/19/2022 2/24/2023 2/27/2023   PHQ-9 Total Score MyChart 21 (Severe depression) - 23 (Severe depression) -   PHQ-9 Total Score 21 21 23 22       BENNY-7 scores:   BENNY-7 SCORE 7/20/2022 2/24/2023 2/27/2023   Total Score 13 (moderate anxiety) 10 (moderate anxiety) -   Total Score 13 10 13       CSSR-S: No flowsheet data found.      Mental Status Examination (limited due to video virtual visit format):  Vital Signs: /81 (BP Location: Right arm, Patient Position: Sitting)   Pulse 75   SpO2 98%   Appearance: appropriately groomed, appears stated age, and in mild distress.  Attitude: cooperative   Eye Contact: good   Muscle Strength and Tone: no gross abnormalities   Psychomotor Behavior: Appropriate and Calm; no evidence of tardive dyskinesia, dystonia, or tics;  "slight psychomotor slowing  Gait and Station: normal, no gross abnormalities   Speech: slowed and soft  Associations: No loosening of associations  Thought Process: coherent and goal directed  Thought Content: no evidence of suicidal ideation or homicidal ideation, no evidence of psychotic thought, no auditory hallucinations present, no visual hallucinations present and catastrophic thinking  Mood: \"anxious and depressed\"  Affect: mood congruent, intensity is blunted, constricted mobility, restricted range and nonreactive  Insight: good  Judgment: intact, adequate for safety  Impulse Control: intact  Oriented to: time, place, person and situation  Attention Span and Concentration: normal  Language: Intact  Recent and Remote Memory: intact to interview. Not formally assessed. No amnesia.  Fund of Knowledge/Assessment of Intelligence: Average  Capacity of Activities of Daily Living: Independent, able to participate in programmatic care services.      Diagnosis/es:  1. Severe episode of recurrent major depressive disorder, without psychotic features (H)        Assessment/Plan:  Javid presents today for follow up. Endorses little to no improvement in sleep, anxiety, depression since completing cross taper from venlafaxine to desvenlafaxine and an increase in mirtazapine.    Anxiety remains high, particularly anxiety about not sleeping. I believe this is contributing to the anxiety that prevents him from falling back to sleep and thus exacerbating his sleep maintenance insomnia. Discussed several options moving forward, including benzodiazepines for anxiety and sleep, Z-drugs for sleep primarily, and quetiapine as an adjunct for his antidepressants and for additional sedation to help with sleep.    Patient previously took zolpidem with little success. Since he has sleep maintenance insomnia and not sleep onset insomnia, this makes sense. Were we to go in this direction in the future we could consider Lunesta or the " extended release Ambien. Not discussed with patient but also a possibility: Ambien (immediate release) or Sonata to be taken when waking during the night to get back to sleep.    Could also consider diazepam or clonazepam at bedtime for additional help with anxiety that would hopefully persist until and through the initial periods of wakefulness during the night.    Ultimately we opted for quetiapine due to less risk of habit formation. Given his prior experience with olanzapine (sedation into the next day), however, he may not tolerate quetiapine either. Can consider other augmenting agents with sedative properties as well.    Also discussed the importance of sleep hygiene and good technique when it comes to insomnia. Discouraged patient from remaining in bed if he is unable to sleep and instead getting up and doing a quiet activity, for example reading his Bible and returning to bed only when nodding off.    Finally, since patient is struggling to implement skills, I recommended he bring those difficulties back to group discussion for additional support from therapists and other group members.    At patient request, I will share this note with outpatient provider. New prescriptions sent today for the increased dose of mirtazapine, a refill for desvenlafaxine, and the new prescription for quetiapine 50 mg by mouth daily at bedtime. I will plan to follow up with the patient later this week, prior to discharge.    Patient would meet criteria for intensive outpatient and I do not believe he plans to continue with us but we will continue to explore treatment options with him in any event.      Depression with anxious distress  o Overall unchanged or perhaps very slightly improved  o Continue desvenlafaxine and mirtazapine at current doses (50 mg and 15 mg, respectively)  o Start quetiapine 50 mg by mouth daily at bedtime  - Patient can cut in half and take 25 mg instead should this prove too sedating  o Continue  partial hospital program    Continue all other medications as reviewed per electronic medical record today    Continue therapy as planned:    Enrolled in partial hospital program    Continues to benefit from services    Continues to meet criteria for this level of care    Patient would be at reasonable risk of requiring a higher level of care in the absence of current services.    I feel this patient does not meet criteria for an involuntary hold and is appropriate for treatment at an outpatient level of care.    Continue with individual therapist as appropriate    Safety plan reviewed:    To the Emergency Department as needed or call after hours crisis line at [phone] or [phone]. [place] Crisis Text Line: Text [place] to 153666 or Suicide LifeLine Chat: suicideVirtual Instruments Corporation.org/chat    Follow-up:     schedule an appointment with me or another program provider in approximately in 1 week(s) or sooner if needed.  Can speak with a staff member or call the appropriate program number (see below) to schedule    Follow up with outpatient provider(s) as planned or sooner if needed for acute medical concerns.    Questions or concerns:    Call program line with questions or concerns (see below)    Websupport may be used to communicate with your provider, but this is not intended to be used for emergencies.      [facility] Adult Mental Health Program lines:  Partial Hospital: [phone]  Dual Disorder: [phone]  Adult Day Treatment:  [phone]  55+/Intensive Outpatient: [phone]    Community Resources:    National Suicide Prevention Lifeline: 988 from any phone, or 745-019-7118 (TTY: 846.830.9628). Call anytime for help.  (www.suicidepreventionlifeline.org)  National [place] on Mental Illness (www.[place].org): 364.867.9202 or 296-070-5060.   Mental Health Association (www.mentalhealth.org): 257.262.6454 or 301-415-8706.  [place] Crisis Text Line: Text [place] to 357501  Suicide LifeLine Chat:  suicidepreventionlifeline.org/chat    Treatment Objective(s) Addressed in This Session:  The purpose of today's virtual visit is for this writer to provide oversight of patient's care while receiving program services. Specific treatment goals addressed included personal safety, symptoms stabilization and management, wellness and mental health, and community resources/discharge planning.     This author or another program provider will follow up with the patient as noted above.     Patient agrees with the current plan of care.    Cassius Olivarez MD  3/07/23      Visit Details:  Type of service:  In-person visit    Start/End Time: see above    Location: [facility] Outpatient Setting: [facility] Mental Health Outpatient Programmatic Care    55 minutes spent on the date of the encounter doing chart review, patient visit, documentation and discussion with other provider(s)     This document completed in part using Dragon Medical One dictation software.  Please excuse any inadvertent word or phrase substitutions.

## 2023-03-08 ENCOUNTER — HOSPITAL ENCOUNTER (OUTPATIENT)
Dept: BEHAVIORAL HEALTH | Facility: CLINIC | Age: 53
Discharge: HOME | End: 2023-03-08
Attending: PSYCHIATRY & NEUROLOGY
Payer: COMMERCIAL

## 2023-03-08 DIAGNOSIS — F33.2 SEVERE EPISODE OF RECURRENT MAJOR DEPRESSIVE DISORDER, WITHOUT PSYCHOTIC FEATURES (H): Primary | ICD-10-CM

## 2023-03-08 PROCEDURE — H0035 MH PARTIAL HOSP TX UNDER 24H: HCPCS | Performed by: OCCUPATIONAL THERAPIST

## 2023-03-08 PROCEDURE — H0035 MH PARTIAL HOSP TX UNDER 24H: HCPCS | Performed by: COUNSELOR

## 2023-03-08 PROCEDURE — H0035 MH PARTIAL HOSP TX UNDER 24H: HCPCS

## 2023-03-08 NOTE — GROUP NOTE
Process Group Note    PATIENT'S NAME: Javid Amaya  MRN:   [id number]  :   1970  ACCT. NUMBER: [id number]  DATE OF SERVICE: 3/08/23  START TIME:  9:00 AM  END TIME:  9:50 AM  FACILITATOR: Janel Andujar LPCC  TOPIC:  Process Group    Diagnoses:  1.  Severe episode of recurrent major depressive disorder, without psychotic features (H)   F33.2      [facility] Partial Hospitalization Program  TRACK: [facility]    NUMBER OF PARTICIPANTS: 5          Data:    Session content: At the start of this group, patients were invited to check in by identifying themselves, describing their current emotional status, and identifying issues to address in this group.   Area(s) of treatment focus addressed in this session included Symptom Management, Personal Safety, Develop / Improve Independent Living Skills and Develop Socialization / Interpersonal Relationship Skills.    Patient reported feeling 'anxious, depressed, exhausted.'  Patient discussed working toward getting through the day.  Skills they plan to practice to address the goal include breathing, exercising.  They identified a potential barrier as 'myself' and lack of motivation.  Patient reported no safety concerns or SIB.  Patient reported no substance use.  Patient reported the opted not to take new prescription due to wife's concerns.   Patient reported feeling proud/grateful for wife and kids.        Therapeutic Interventions/Treatment Strategies:  Psychotherapist offered support, feedback and validation and reinforced use of skills. Treatment modalities used include Motivational Interviewing, Cognitive Behavioral Therapy and Dialectical Behavioral Therapy. Interventions include Behavioral Activation: Explored how behaviors effect mood and interact with thoughts and feelings and Encouraged strategies to reduce individual procrastination and increase motivation by increasing goal-directed activities to enhance mood and reduce symptoms., Cognitive  Restructuring:  Explored impact of ineffective thoughts / distortions on mood and activity and Assisted patient in formulating new neutral/positive alternatives to challenge less helpful / ineffective thoughts and Coping Skills: Assisted patient in understanding the purpose of planning / creating / participating / sharing in positive experiences.    Assessment:    Patient response:   Patient responded to session by accepting feedback, giving feedback, listening, focusing on goals, being attentive, demonstrating behavior change and verbalizing understanding    Possible barriers to participation / learning include: and no barriers identified    Health Issues:   None reported       Substance Use Review:   Substance Use: No active concerns identified.    Mental Status/Behavioral Observations  Appearance:   Appropriate   Eye Contact:   Good   Psychomotor Behavior: Normal   Attitude:   Cooperative   Orientation:   All  Speech   Rate / Production: Monotone    Volume:  Normal   Mood:    Anxious  Depressed   Affect:    Flat   Thought Content:   Clear  Thought Form:  Coherent  Logical     Insight:    Good     Plan:     Safety Plan: No current safety concerns identified.  Recommended that patient call 911 or go to the local ED should there be a change in any of these risk factors.     Barriers to treatment: None identified    Patient Contracts (see media tab):  None    Substance Use: Not addressed in session     Continue or Discharge: Patient will continue in Adult Partial Hospitalization Program (PHP)  as planned. Patient is likely to benefit from learning and using skills as they work toward the goals identified in their treatment plan.      Janel Andujar, SARAH  March 8, 2023

## 2023-03-08 NOTE — GROUP NOTE
Psychotherapy Group Note    PATIENT'S NAME: Javid Amaya  MRN:   [id number]  :   1970  ACCT. NUMBER: [id number]  DATE OF SERVICE: 3/08/23  START TIME:  1:00 PM  END TIME:  1:50 PM  FACILITATOR: Janel Andujar LPCC  TOPIC: MH EBP Group: Relationship Skills  [facility] Adult Partial Hospitalization Program  TRACK: PHP2    NUMBER OF PARTICIPANTS: 4    Summary of Group / Topics Discussed:  Relationship Skills: Relationship Mapping: Patients identified different types of relationships they have in their life and examined if there is conflict or closeness, the degree of conflict or closeness, and the reason for conflict. The goal of this topic is to help patients gain awareness of the relationships they have with others, identify types of conflict in patients  lives and how they impact symptoms/functioning, and identify how they can improve relationships with relationship and interpersonal skills they have learned.     Patient Session Goals / Objectives:    Familiarized patients with awareness to the different types of relationships they may have with different people and substances in their lives    Discussed and practiced strategies to promote healthier understanding of interpersonal relationships with a focus on awareness of conflict, the causes of conflict, and the ways to transform conflict    Discussed the use of substances and its impact on their relationships      Patient Participation / Response:  Fully participated with the group by sharing personal reflections / insights and openly received / provided feedback with other participants.    Demonstrated understanding of topics discussed through group discussion and participation, Demonstrated understanding of relationship skills and communication skills and Verbalized understanding of communication skills, communication challenges, and communication strengths    Treatment Plan:  Patient has a current master individualized treatment plan.   See Epic treatment plan for more information.    Janel Andujar LPCC

## 2023-03-08 NOTE — GROUP NOTE
Psychoeducation Group Note    PATIENT'S NAME: Javid Amaya  MRN:   [id number]  :   1970  ACCT. NUMBER: [id number]  DATE OF SERVICE: 3/08/23  START TIME: 10:00 AM  END TIME: 10:50 AM  FACILITATOR: Param Barrios RN  TOPIC: MH Wellness Group: [facility] Adult Partial Hospitalization Program  TRACK: PHP2    NUMBER OF PARTICIPANTS: 5    Summary of Group / Topics Discussed:  Patients were provided education and discussed the topic of resilience using the APA's Road to Resilience.    Patient Session Goals / Objectives:  ? Patients discovered effective resilience-building strategies  ? Patients determined the importance of building resilience in supporting MH  ? Patients identified one technique they will use to support their resilience              Patient Participation / Response:  Fully participated with the group by sharing personal reflections / insights and openly received / provided feedback with other participants.    Demonstrated understanding of topics discussed through group discussion and participation    Treatment Plan:  Patient has a current master individualized treatment plan.  See Epic treatment plan for more information.    Param Barrios RN

## 2023-03-08 NOTE — GROUP NOTE
Psychotherapy Group Note    PATIENT'S NAME: Javid Amaya  MRN:   [id number]  :   1970  ACCT. NUMBER: [id number]  DATE OF SERVICE: 3/07/23  START TIME:  1:00 PM  END TIME:  1:50 PM  FACILITATOR: Finn Ramos LMFT  TOPIC:  EBP Group: Behavioral Activation  [facility] Adult Partial Hospitalization Program  TRACK: PHP2    NUMBER OF PARTICIPANTS: 7    Summary of Group / Topics Discussed:  Behavioral Activation: The Change Process - Behavior Change: Patients explored the process and types of change, including but not limited to, theories of change, steps to making change, methods of changing behavior, and potential barriers.  Patients worked to identify what changes may benefit their daily lives, and work towards a plan to implement change.      Patient Session Goals / Objectives:    Demonstrate understanding of the change process.      Identify positive and negative behavioral patterns.    Identify personal barriers to change      Patient Participation / Response:  Moderately participated, sharing some personal reflections / insights and adequately adequately received / provided feedback with other participants.    Demonstrated understanding of topics discussed through group discussion and participation, Identified barriers to change and Practiced skills in session    Treatment Plan:  Patient has a current master individualized treatment plan and today was our weekly review of the patient's progress.  See Epic treatment plan for progress / updates on goals and plan.    MANOLO Connolly

## 2023-03-08 NOTE — PROGRESS NOTES
"   Partial Hospitalization Program  Occupational Therapy Evaluation     Patient: Javid Amaya  MRN: [id number]  : 1970  Age: 52 year old  Sex: male  Program Start date: 2023  Date of Occupational Therapy Evaluation: 2023   Anticipated discharge: On or around: 3/10/2023    Medical and Therapy History  Presenting Problem (From DA completed on 2023):   Chief Complaint:   The reason for seeking services at this time is: \"depression, anxiety, insomnia\".  The problem(s) began 21.  Mental Health Diagnosis (From Dr. Cassius Olivarez H&P completed on 2023):   1. Severe episode of recurrent major depressive disorder, without psychotic features (H)  F33.2            Have not ruled out 300.02 (F41.1) Generalized Anxiety Disorder  Current ways taking care of presenting issues: medications, couples therapist, not working currently  Current Patient Providers: Maria M Rivero, Psychiatry at [facility]; Luis Mcintosh for couples therapy  Medical Conditions and Co-morbidities: insomnia; per chart has had 2 concussions and was hospitalized for 1 week with one of them  Occupational Therapy History: Chart/patient indicates no knowledge of OT.  Occupational Profile: Occupations are  the everyday activities that people do as individuals, in families, and with communities to occupy time and bring meaning and purpose to life. Occupations include things people need to, want to and are expected to do  ([employer] Federation of Occupational Therapists, 2012a, para. 2). Occupations are categorized as activities of daily living, instrumental activities of daily living, health management, rest and sleep, education, work, play, leisure, spirituality, and social participation within specific environments or contexts.  Occupational History:   Patient concerns related to participation in occupations and how they have changed over time  Javid reports, \"prior to this bout of depression + anxiety + insomnia I " "handled all of these tasks just fine.  With the depression, anxiety & insomnia I've lost all confidence motivation + drive\"   Which occupations does patient currently feel successful in  Javid identified he gets along well with others as a strength  From Chart review - strengths include: exercise routine, anastasia / spirituality, friends / good social support, family support, insight, intelligence, motivation and motivated, driven. Good at problem solving.\"       What are barriers to engagement in occupations:  Javid endorses a high level of depressive, anxiety symptoms including low motivation, low energy, poor concentration, worry, rumination, poor sleep   Personal Interests, values, cultural considerations  Per DA \"Cultural influences and impact on patient's life structure, values, norms, and healthcare: Bahai,  grew up on farm..  Contextual influences on patient's health include: Patient denied.Patient identified their preferred language to be English. Patient reported they do not need the assistance of an  or other support involved in therapy. Patient reports they are involved in [employer] of anastasia activities. They reports spirituality impacts recovery in the following ways:  Anastasia is important to him.\"   Performance Patterns (routines, roles, habits & rituals)   Javid reports he is not working currently.  Has difficulty engaging in activities/tasks without support from his wife and son.     Environment & context  Lives with his wife. Housing is stable     Assessment of Occupational Performance Patterns: Acquired habits, routines, roles, and rituals used in the process of engaging consistently in occupations and can support or hinder occupational performance. Performance patterns help establish lifestyles and occupational balance (proportion of time spend in productive, restorative, and leisure activities) and are shaped in part by context and cultural norms (AOTA Occupational Therapy Practice " Framework: Domain and Process Fourth Edition).     Assessment format:   [facility] Occupational Therapy Screening tool, Occupational Self Assessment (RAFI), Interview, Observation in Group Process, Chart review    Occupations:  Activities of Daily Living: Activities oriented toward taking care of one's own body and completed on a routine basis Patient report and observation   Bathing/showering  Personal hygiene/grooming  Dressing/clothing mgmt.  Medication Management  Nutritional intake  Sexual activity  Functional mobility  Javid reports difficulty with daily self cares without prompting/support from his wife.  Reports his son exercises with him and that helps.         Instrumental Activities of daily Living: Activities to support daily life within the home and community   Care of others  Care of pets  Communication mgmt.  Driving & community mobility  Financial management  Home establishment & management  Moravian & spiritual expression  Safety & emergency preparedness  Shopping Reports no difficulty driving currently    Difficulty engaging in tasks without prompting/encouragment from his wife       Health Management: Activities related to developing, managing, and maintaining health and wellness routines, including self-management, with the goal of improving or maintaining health to support participation in other occupations.   Social & emotional health promotion & maintenance.  Symptom & condition management.  Communication with healthcare system.  Mindfulness  Physical activity  Substance use Reports his son exercises with him    No substance use         Rest & Sleep: Activities related to obtaining restorative rest and sleep to support healthy, active engagement in other occupations.   Rest & relaxation  Sleep preparation & promotion  Sleep participation Reports insomnia  - sleep is poor     Education: Activities needed for learning and participating in the educational environment.   Formal  "educational participation.  Informal personal education needs or interest exploration.  Informal educational participation. Per DA, \"Patient reported had no significant delays in developmental tasks. Patient's highest education level was high school graduate. Patient identified the following learning problems: none reported.  Patient reports they are able to understand written materials.\"        Work: Labor or exertion related to the development, production, delivery, or management of objects or services; benefits. May be financial or nonfinancial (e.g., social connectedness, contributions to society, structure and routine to daily life;   Employment interest & pursuits  Employment seeking & acquisition  Job performance & maintenance  long-term preparation & adjustment  Volunteer exploration & participation. Currently not working - has not worked since January 2023 as symptoms are interfering too much     Leisure & Play: Nonobligatory activity that is intrinsically motivated and engaged in during discretionary time, that is, time not committed to obligatory occupations. Activities that are intrinsically motivated, internally controlled, and freely chosen and that may include fantasy, exploration, humor, risk taking, contests, and celebrations   Leisure/play exploration  Leisure/play participation Javid reports little enjoyment in activities currently.  Reports he struggles to participate in leisure due to belief/upbringing that work needs to be done first.  Reports he does enjoy outdoor activities     Social Participation: Activities that involve social interaction with others, including family, friends, peers, and community members, and that support social interdependence   Community participation  Family participation  Friendships  Intimate partner relationships  Peer group participation Per DA, \"Patient's current living/housing situation is: Patient resides with wife and children and they report that housing is " "stable. Patient identified parents; spouse; other as part of their support system.  Patient identified the quality of these relationships as stable and meaningful.\"          Client Factors & Performance Skills:   Safety:  As noted above or in diagnostic assessment. No plan or intent.  Has passive thoughts but able to contract for safety  Substance use:   No concerns reported in diagnostic assessment.   Trauma History: Per DA:  Significant Losses / Trauma / Abuse / Neglect Issues:   Patient did not serve in the .  There are indications or report of significant loss, trauma, abuse or neglect issues related to: are no indications and client denies any losses, trauma, abuse, or neglect concerns.  Concerns for possible neglect are not present.        Neurosensory patterns and preferences:    Awareness of body (Interoception/sensation/NS arousal level): some    Awareness of sensory based coping skills: some - reports breathing helps sometimes    Mood: Depressed  Anxious  Affect: Constricted  Blunted    Thought Content:  Clear  Ruminative    Verbal Content: generally very quiet but answers direct questions when drawn out    Concentration: Inconsistent    Energy Level:  Low  Needs encouragement    Motivation/Procrastination:  Reports very low motivation, task initiation on his own    Frustration/Stress Management:  Independently identifies stressors/symptoms  Needs assistance to manage frustrations/identify & apply coping skills    Self Awareness:  Demonstrates/espressess negative view of self  Demonstrates/expresses lack of confidence in abilities    Time Management:  Needs assistance to organize use of time and/or structure daily activities effectively      Intervention/Plan of Care:   (See Multidisciplinary Plan of Care for more details and progress towards goals)     Clinical Occupational Summary: Patient is a 52 year old  male diagnosed with Severe episode of recurrent major depressive disorder, without " "psychotic features; have not ruled out Generalized anxiety disorder.  Patient was referred to the program by outpatient providers. Patient has been a quiet, attentive participant in groups. Patient has history of PHP at [facility] and tried IOP at [facility] but that was not a good fit per chart. Patient noted mental health symptoms have been impacting important occupations such as work and activities of daily living, self cares, and community involvement.  Pt reports he is not working currently and needs more support and treatment at this time. Patient identified goals to work on while in the Partial Program.    Treatment diagnosis: Impaired participation in valued occupations self care, leisure, work, community involvement, socializing due to mental health symptoms: anxiety, depression, immobilization/shut down/dysregulation, negative self talk, ruminative thinking.   Occupational Therapy Goal(s):   In Occupational Therapy groups Javid will:    Lifestyle health: Learn, practice, apply 2 skills/strategies that support participation in meaningful activities with an emphasis on self-care, such daily hygiene and self-care \"taking care of myself,\"  \"showering on a regular basis [daily] and not having to be told what to do\" and consistent routines to improve mental health management after discharge.     Self Regulation: Learn, practice, apply 2 body based self-regulation skills for improved emotional regulation and distress tolerance to support participation in meaningful (roles, routines, relationships and responsibilities), such as \"more exercise,\" Javid has been going on the treadmill 2 to 3 times a week (for 30 minutes) and he would like to be doing if 5 times a week.      Skilled Occupational Therapy Interventions: Including but not limited to:    Evaluation, goal setting, ongoing assessment, treatment planning, discharge planning.     Treatment groups: Facilitation and group cohesion development.  Psychoeducation and " Experiential groups focusing on: Self-awareness & self-expression, lifestyle health, neurosensory applications/interventions, mindfulness, motivation, energy management, meaningful & purposeful intervention activities, self-regulation skill development, self-compassion and resiliency development, therapeutic use of self, community resources.    Medical Necessity: Moderate complexity (93943)  CPT codes & descriptors Occupational Profile and  Medical/Therapy History Assessment of Occupational Performance Clinical   Decision Making   Overall Level       Low Complexity (93050) Brief history relating to presenting  Problem   Problem-focused. 1-3 performance  deficits relating to  physical, cognitive, psychosocial  limitations/restrictions Low complexity, limited  amount of treatment options,  no assessment modification,  no co-morbidities          x Moderate Complexity (59548)   Expanded review of therapy/  medical records. Additional  review of physical, cognitive,  psychosocial performance Detailed. 3-5 performance  deficits relating to physical,  cognitive, psychosocial limitations/  restrictions Moderate analytical complexity,  detailed assessments,  minimal to moderate modification  of assessments, may have  comorbidities.     x x x    High Complexity (45374)   Extensive review of physical,  cognitive, psychosocial performance Comprehensive, 5 or more  performance deficits relating  to physical, cognitive, and  psychosocial limitations/restrictions. High analytical complexity,  comprehensive assessments,  multiple treatment options,  significant modifications of  assessment, comorbidities  affecting performance.            Would patient benefit from skilled Occupational Therapy Intervention to work on the above goals?    Yes    Patients potential for improvement towards goals: excellent, good, fair, guarded, poor     Signature: LOUISE Soriano/IMANI

## 2023-03-08 NOTE — GROUP NOTE
Psychoeducation Group Note    PATIENT'S NAME: Javid Amaya  MRN:   [id number]  :   1970  ACCT. NUMBER: [id number]  DATE OF SERVICE: 3/08/23  START TIME:  2:00 PM  END TIME:  2:50 PM  FACILITATOR: Janel Andujar LPCC  TOPIC: MH Life Skills Group: Communication and Social Skills Development  [facility] Adult Partial Hospitalization Program  TRACK: PHP2    NUMBER OF PARTICIPANTS: 3    Summary of Group / Topics Discussed:  Communication and Social Skills Development: Communication Styles: Anatomy of Trust: BRAVING Skill: Patients were taught and gained awareness of interpersonal relationships, specifically trust.  Patients watched a video by Edith Lee and were introduced to the acronym BRAVING as a tool to reflect on issues in important relationships in a more manageable way and use skills to express the true issue of the conflict.  Patients also reflected on self trust and self compassion.      Patient Session Goals / Objectives:    Identified skills that help improve interpersonal relationships and express conflict        Improved awareness of important aspects of trust in interpersonal relationships and how this relates to mental health recovery        Established a plan for practice of these skills in their own environments    Practiced and reflected on how to generalize taught skills to their everyday life      Patient Participation / Response:  Fully participated with the group by sharing personal reflections / insights and openly received / provided feedback with other participants.    Patient presentation: attentive, engaged, Demonstrated understanding of content through application of concepts to personal experiences and goals  and Verbalized understanding of content    Treatment Plan:  Patient has a current master individualized treatment plan.  See Epic treatment plan for more information.    SARAH Dodd

## 2023-03-08 NOTE — GROUP NOTE
Psychotherapy Group Note    PATIENT'S NAME: Javid Amaya  MRN:   [id number]  :   1970  ACCT. NUMBER: [id number]  DATE OF SERVICE: 3/07/23  START TIME:  2:00 PM  END TIME:  2:50 PM  FACILITATOR: Finn Ramos LMFT  TOPIC:  EBP Group: Self-Awareness  [facility] Adult Partial Hospitalization Program  TRACK: PHP2    NUMBER OF PARTICIPANTS: 5    Summary of Group / Topics Discussed:  Self-Awareness: Values: Patients identified personal values by examining development of their current values and how their values influence their daily functioning and life choices. Patients explored the impact of their values on their thoughts, feelings, and actions. Patients discussed definition of personal values and how they develop and change over time. The goal is to help patients reconcile value conflicts and achieve balance and flexibility to improve mood and daily functioning.     Patient Session Goals / Objectives:    Examined development of values and impact of values on functioning    Identified / prioritized important values related to current well-being     Identified strategies to change or enhance values to positively impact symptoms    Assisted patients to find ways to adapt functioning to better fit their values      Patient Participation / Response:  Fully participated with the group by sharing personal reflections / insights and openly received / provided feedback with other participants.    Demonstrated understanding of topics discussed through group discussion and participation, Demonstrated understanding of values, strengths, and challenges to learn about themselves and Practiced skills in session    Treatment Plan:  Patient has a current master individualized treatment plan and today was our weekly review of the patient's progress.  See Epic treatment plan for progress / updates on goals and plan.    MANOLO Connolly

## 2023-03-08 NOTE — GROUP NOTE
Process Group Note    PATIENT'S NAME: Javid Amaya  MRN:   [id number]  :   1970  ACCT. NUMBER: [id number]  DATE OF SERVICE: 3/07/23  START TIME:  9:00 AM  END TIME:  9:50 AM  FACILITATOR: Finn Ramos LMFT  TOPIC:  Process Group    Diagnoses:  1. Severe episode of recurrent major depressive disorder, without psychotic features (H)  F33.2            Have not ruled out 300.02 (F41.1) Generalized Anxiety Disorder      [facility] Adult Partial Hospitalization Program  TRACK: PHP2    NUMBER OF PARTICIPANTS: 7          Data:    Session content: At the start of this group, patients were invited to check in by identifying themselves, describing their current emotional status, and identifying issues to address in this group.   Area(s) of treatment focus addressed in this session included Symptom Management, Personal Safety and Community Resources/Discharge Planning.  Patient reported feeling  still just really down and super anxious   constant anxiety.    Patient discussed working toward  make it through the day.    For skills they will use to address their goal(s), patient identified breathing and think about things he s grateful for, such as positives in his life.   A barrier to working toward their goal(s) and/or addressing mental health symptoms the patient identified was anxiety, and  feeling like I m stuck.   Patient reported no safety concerns and/or self-injurious behaviors. Patient reported no substance use. Patient reported they are taking their medications as prescribed.   Patient reported feeling proud/grateful for his sons.    Therapeutic Interventions/Treatment Strategies:  Psychotherapist offered support, feedback and validation. Treatment modalities used include Cognitive Behavioral Therapy. Interventions include Coping Skills: Facilitated understanding of  what factors may contribute to symptom relapse and skills plan to manage symptom relapse  and Symptoms Management: Promoted  understanding of their diagnoses and how it impacts their functioning.    Assessment:    Patient response:   Patient responded to session by accepting feedback, listening, focusing on goals, being attentive and accepting support    Possible barriers to participation / learning include: Severity of symptoms    Health Issues:   None reported       Substance Use Review:   Substance Use: No active concerns identified.    Mental Status/Behavioral Observations  Appearance:   Appropriate   Eye Contact:   Fair   Psychomotor Behavior: Normal   Attitude:   Cooperative   Orientation:   All  Speech   Rate / Production: Normal    Volume:  Normal   Mood:    Depressed Anxious  Affect:    Subdued  Thought Content:   Clear and Safety denies any current safety concerns including suicidal ideation, self-harm, and homicidal ideation  Thought Form:  Coherent  Logical     Insight:    Good     Plan:     Safety Plan: No current safety concerns identified.  Recommended that patient call 911 or go to the local ED should there be a change in any of these risk factors.     Barriers to treatment: None identified    Patient Contracts (see media tab):  None    Substance Use: Provided encouragement towards sobriety     Continue or Discharge: Patient will continue in Adult Partial Hospitalization Program (PHP)  as planned. Patient is likely to benefit from learning and using skills as they work toward the goals identified in their treatment plan.    Finn Ramos, MANOLO  March 8, 2023

## 2023-03-08 NOTE — GROUP NOTE
OT Clinic Group Note    PATIENT'S NAME: Javid Amaya  MRN:   [id number]  :   1970  ACCT. NUMBER: [id number]  DATE OF SERVICE: 3/08/23  START TIME: 11:00 AM  END TIME: 11:50 AM  FACILITATOR: Cyndee Locke OTR/L  TOPIC: [facility] OT Group: Occupational Therapy Clinic  [facility] Adult Partial Hospitalization Program  TRACK: 2    NUMBER OF PARTICIPANTS: 5    Summary of Group / Topics Discussed:  Occupational Therapy Clinic: Provided opportunity for patients to independently choose and engage in a therapeutic activity that supports progress towards their goals and psychiatric recovery. The Occupational Therapy Clinic provides a context for patients to monitor their symptoms, gain self-awareness, practice skills (self-regulation, mindfulness, self-talk, focus/concentration, social, productivity), build a sense of self efficacy and mastery, as well as receive validation, support, and resources. OT checks in, observes, assesses, and monitors performance skills and patterns in context with each group member. New patient completed the Occupational Self Assessment (RAFI) in which identified functional areas their mental health status is impacting and which are most important for them to work on while in the Partial Hospitalization Program. Patients were provided orientation to the OT clinic and overview of purpose of the OT clinic in support of meeting their goals.     Patient Session Goals / Objectives:    Independently identify a purposeful and meaningful therapeutic activity.    Identified which goal(s) they are intentionally working on during session.     Reflected on their performance and share insight about their progress.    Practiced and identified a way to generalize a skill to their everyday life      Patient Participation / Response:  Fully participated with the group by sharing personal reflections / insights and openly received / provided feedback with other participants.    Patient presentation:  actively engaged in a paper pencil puzzle in session; energy level seemed slightly improved today compared to earlier in the week, Verbalized understanding of content and Patient would benefit from additional opportunities to practice the content to be able to generalize it to their everyday life with increased intentionality, consistency, and efficacy in support of their psychiatric recovery    Treatment Plan:  Patient has a current master individualized treatment plan.  See Epic treatment plan for more information.    Cnydee Locke, OTR/L

## 2023-03-09 ENCOUNTER — HOSPITAL ENCOUNTER (OUTPATIENT)
Dept: BEHAVIORAL HEALTH | Facility: CLINIC | Age: 53
Discharge: HOME | End: 2023-03-09
Attending: PSYCHIATRY & NEUROLOGY
Payer: COMMERCIAL

## 2023-03-09 DIAGNOSIS — F33.2 SEVERE EPISODE OF RECURRENT MAJOR DEPRESSIVE DISORDER, WITHOUT PSYCHOTIC FEATURES (H): Primary | ICD-10-CM

## 2023-03-09 PROCEDURE — H0035 MH PARTIAL HOSP TX UNDER 24H: HCPCS | Performed by: OCCUPATIONAL THERAPIST

## 2023-03-09 PROCEDURE — H0035 MH PARTIAL HOSP TX UNDER 24H: HCPCS

## 2023-03-09 PROCEDURE — H0035 MH PARTIAL HOSP TX UNDER 24H: HCPCS | Performed by: COUNSELOR

## 2023-03-09 NOTE — GROUP NOTE
Psychoeducation Group Note    PATIENT'S NAME: Javid Amaya  MRN:   [id number]  :   1970  ACCT. NUMBER: [id number]  DATE OF SERVICE: 3/09/23  START TIME: 11:00 AM  END TIME: 11:50 AM  FACILITATOR: Param Barrios RN  TOPIC:  Wellness Group: [facility] Adult Partial Hospitalization Program  TRACK: PHP2    NUMBER OF PARTICIPANTS: 6    Summary of Group / Topics Discussed:  Foundations of Health: Nutrition: Macronutrients: Patient were provided education on major macronutrients, their role in the body, and why it is important to meet daily nutritional needs. Obstacles to meeting daily nutritional needs were identified in group discussion and strategies to promote improved nutrition were explored. Macronutrients discussed include: carbohydrates, proteins and amino acids, fats, fiber, and water.     Patient Session Goals / Objectives:  ? Discussed the role of diet on mood, physical health, energy level, and the development of chronic disease.  ? Identified daily nutritional needs recommended by the USDA via My Plate education resources  ? Developing increased health literacy skills in finding credible nutrition information from reliable sources      Patient Participation / Response:  Fully participated with the group by sharing personal reflections / insights and openly received / provided feedback with other participants.    Demonstrated understanding of topics discussed through group discussion and participation    Treatment Plan:  Patient has a current master individualized treatment plan.  See Epic treatment plan for more information.    Param Barrios RN

## 2023-03-09 NOTE — DISCHARGE SUMMARY
"       Adult Mental Health Intensive Outpatient Discharge Summary/Instructions      Patient: Javid Amaya MRN: [id number]   : 1970 Age: 52 year old Sex: male     Admission Date: 23  Discharge Date: 3/10/223  Diagnosis:  Severe episode of recurrent major depressive disorder, without psychotic features (H)   F33.2      Focus of Treatment / Progress    Personal Safety: Some passive thoughts of not wanting to be here but no plan or intent to act on these     * Follow your safety plan     * Call crisis lines as needed:    [facility] [phone] [facility] [phone]  [facility] [phone] Crisis Connection [phone]  [facility] [phone] [facility] [phone]  [facility] [phone] National Suicide Prevention 1-368.594.2008 or [address] [place] [address] [phone] Suicide Prevention [phone]  [facility] [phone]    Managing symptoms of:  depression    Community support/health:    National [place] on Mental Illness   Https://namimn.org/    [place] Warm line Peer support  https://mentalhealthmn.org/support/[place]-warmline/   802.207.6160  Open 7 days a week 9 am to 9 pm  Text \"Support\" to 51827    [facility] Help line  [phone]    [place] (support for men)  https://www.[facility].org/support-offerings/support-groups/      Managing Symptoms and Preventing Relapse    * Go to all of your appointments    * Take all medications as directed.      * Carry a current list if medication with you    * Do not use illicit (street) drugs.  Avoid alcohol    * Report these symptoms to your care team. These are early signs of relapse:   Thoughts of suicide   Losing more sleep   Increased confusion   Mood getting worse   Feeling more aggressive   Other:  Anxiety level is high;     *Use these skills daily:  Create a routine or structure that works for your needs, practice mindfulness, move your body intentionally every day, eat regular " balanced meals daily, reach out to supports, and practice self-compassion.  Prayer. Keep active.  Radical acceptance. Core beliefs. Practice self compassion. Recognize small steps/progress      Copy of summary sent to: patient via [employer]    Follow up with psychiatrist / main caregiver:  [facility],    Next visit: 3/14/2023    Follow up with your therapist: [facility] 1-701.877.2827 [employer]  [facility] [employer]    Couples therapist: Luis Mcintosh, independent    Next visit: 3/14/2023    Go to group therapy and / or support groups at:   Consider getting involved with the [employer], www.Blendspace.org (support groups for Men with depression), for additional support.     See your medical doctor about:  As needed    Other:  Your treatment team appreciates having the opportunity to work with you and wishes you the best      Client Signature:_______________________  Date / Time:___________  Staff Signature:________________________   Date / Time:___________

## 2023-03-09 NOTE — GROUP NOTE
Psychoeducation Group Note    PATIENT'S NAME: Javid Amaya  MRN:   [id number]  :   1970  ACCT. NUMBER: [id number]  DATE OF SERVICE: 3/09/23  START TIME: 10:00 AM  END TIME: 10:50 AM  FACILITATOR: Cyndee Locke OTR/L  TOPIC: MH PHP OT Group: Self- Regulation Skills  [facility] Partial Hospitalization Program  TRACK: 2    NUMBER OF PARTICIPANTS: 7    Summary of Group / Topics Discussed:  Self-Regulation Skills: Sensory Modulation: Patients were provided with education on Autonomic Nervous System activation and the importance of identifying their Window of Tolerance for effective self-regulation.  Patients were taught how to recognize specific signs and symptoms of their individualized state of arousal and how to make changes when needed.  Patient's explored body based skills (bottom up processing skills) and techniques to help manage symptoms and change level of arousal when needed to be in control and comfortable so they are able to function in different environments.         Patient Session Goals / Objectives:    Darby how the ANS works and importance of its  impact on functioning and mental wellbeing    Darby how developing interoceptive awareness can help one self-regulate sooner rather than later    Identified signs and symptoms of current level of arousal and ways to make changes in level of arousal when needed    Identified specific and individualized neurosensory skills to help when distressed and for crisis management    Established a plan for practice of these skills in their own environments      Patient Participation / Response:  Fully participated with the group by sharing personal reflections / insights and openly received / provided feedback with other participants.    Patient presentation: quiet in session but seemed attentive to discussion, Verbalized understanding of content and Patient would benefit from additional opportunities to practice the content to be able to  generalize it to their everyday life with increased intentionality, consistency, and efficacy in support of their psychiatric recovery    Treatment Plan:  Patient has a current master individualized treatment plan.  See Epic treatment plan for more information.    Cyndee Locke, OTR/L

## 2023-03-10 ENCOUNTER — HOSPITAL ENCOUNTER (OUTPATIENT)
Dept: BEHAVIORAL HEALTH | Facility: CLINIC | Age: 53
Discharge: HOME | End: 2023-03-10
Attending: PSYCHIATRY & NEUROLOGY
Payer: COMMERCIAL

## 2023-03-10 VITALS — DIASTOLIC BLOOD PRESSURE: 80 MMHG | HEART RATE: 77 BPM | OXYGEN SATURATION: 98 % | SYSTOLIC BLOOD PRESSURE: 107 MMHG

## 2023-03-10 DIAGNOSIS — F33.2 SEVERE EPISODE OF RECURRENT MAJOR DEPRESSIVE DISORDER, WITHOUT PSYCHOTIC FEATURES (H): Primary | ICD-10-CM

## 2023-03-10 PROCEDURE — H0035 MH PARTIAL HOSP TX UNDER 24H: HCPCS | Performed by: COUNSELOR

## 2023-03-10 PROCEDURE — H0035 MH PARTIAL HOSP TX UNDER 24H: HCPCS | Performed by: OCCUPATIONAL THERAPIST

## 2023-03-10 PROCEDURE — 99214 OFFICE O/P EST MOD 30 MIN: CPT | Performed by: PSYCHIATRY & NEUROLOGY

## 2023-03-10 PROCEDURE — H0035 MH PARTIAL HOSP TX UNDER 24H: HCPCS

## 2023-03-10 PROCEDURE — H0035 MH PARTIAL HOSP TX UNDER 24H: HCPCS | Performed by: SOCIAL WORKER

## 2023-03-10 ASSESSMENT — COLUMBIA-SUICIDE SEVERITY RATING SCALE - C-SSRS
1. SINCE LAST CONTACT, HAVE YOU WISHED YOU WERE DEAD OR WISHED YOU COULD GO TO SLEEP AND NOT WAKE UP?: NO
2. HAVE YOU ACTUALLY HAD ANY THOUGHTS OF KILLING YOURSELF?: NO
ATTEMPT SINCE LAST CONTACT: NO

## 2023-03-10 NOTE — TELEPHONE ENCOUNTER
----- Message from LOUISE Soriano/L sent at 3/10/2023  3:29 PM CST -----  Regarding: Patient discharging from [facility] today  Javid Suggs discharged from [facility] today.  Please remove from [facility] after today.    Thank you,  LOUISE Soriano/IMANI

## 2023-03-10 NOTE — GROUP NOTE
Process Group Note    PATIENT'S NAME: Javid Amaya  MRN:   [id number]  :   1970  ACCT. NUMBER: [id number]  DATE OF SERVICE: 3/09/23  START TIME:  9:00 AM  END TIME:  9:50 AM  FACILITATOR: Finn Ramos LMFT  TOPIC:  Process Group    Diagnoses:  1. Severe episode of recurrent major depressive disorder, without psychotic features (H)  F33.2            Have not ruled out 300.02 (F41.1) Generalized Anxiety Disorder      [facility] Adult Partial Hospitalization Program  TRACK: PHP2    NUMBER OF PARTICIPANTS: 7          Data:    Session content: At the start of this group, patients were invited to check in by identifying themselves, describing their current emotional status, and identifying issues to address in this group.   Area(s) of treatment focus addressed in this session included Symptom Management, Personal Safety and Community Resources/Discharge Planning.  Patient reported feeling  down, depressed, anxious.    Patient discussed working toward get through the day.   For skills they will use to address their goal(s), patient identified he got up and exercised this morning, breathing.   A barrier to working toward their goal(s) and/or addressing mental health symptoms the patient identified was  anxiety and the brain just not wanting to function right.   Patient reported no safety concerns and/or self-injurious behaviors. Patient reported no substance use. Patient reported they are taking their medications as prescribed.   Patient reported feeling proud/grateful for his  family and what they are putting up with.     Therapeutic Interventions/Treatment Strategies:  Psychotherapist offered support, feedback and validation. Treatment modalities used include Cognitive Behavioral Therapy. Interventions include Coping Skills: Facilitated understanding of  what factors may contribute to symptom relapse and skills plan to manage symptom relapse  and Symptoms Management: Promoted understanding of  their diagnoses and how it impacts their functioning.    Assessment:    Patient response:   Patient responded to session by accepting feedback, giving feedback, listening, focusing on goals, being attentive and accepting support    Possible barriers to participation / learning include: Severity of symptoms    Health Issues:   None reported       Substance Use Review:   Substance Use: No active concerns identified.    Mental Status/Behavioral Observations  Appearance:   Appropriate   Eye Contact:   Fair  Psychomotor Behavior: Normal   Attitude:   Cooperative   Orientation:   All  Speech   Rate / Production: Normal    Volume:  Normal   Mood:    Depressed Anxious  Affect:    Subdued  Thought Content:   Clear and Safety denies any current safety concerns including suicidal ideation, self-harm, and homicidal ideation  Thought Form:  Coherent  Logical     Insight:    Good     Plan:     Safety Plan: No current safety concerns identified.  Recommended that patient call 911 or go to the local ED should there be a change in any of these risk factors.     Barriers to treatment: None identified    Patient Contracts (see media tab):  None    Substance Use: Provided encouragement towards sobriety     Continue or Discharge: Patient will continue in Adult Partial Hospitalization Program (PHP)  as planned. Patient is likely to benefit from learning and using skills as they work toward the goals identified in their treatment plan.    Finn Ramos, LMFT  March 10, 2023

## 2023-03-10 NOTE — GROUP NOTE
Psychotherapy Group Note    PATIENT'S NAME: Javid Amaya  MRN:   [id number]  :   1970  ACCT. NUMBER: [id number]  DATE OF SERVICE: 3/09/23  START TIME:  2:00 PM  END TIME:  2:50 PM  FACILITATOR: Finn Ramos LMFT  TOPIC:  EBP Group: Coping Skills  [facility] Adult Partial Hospitalization Program  TRACK: PHP2    NUMBER OF PARTICIPANTS: 7    Summary of Group / Topics Discussed:  Coping Skills: Relapse Planning: Patients discussed and increased understanding of how anticipating and planning for possible increased symptoms is a proactive way to reduce the likelihood of relapse.  Patients shared individualized factors that may lead to increased symptoms and decompensation in functioning.  Each patient developed a relapse prevention plan designed to help them recognize when they may have increasing symptoms requiring them to take action and notify their key supports and care team.      Patient Session Goals / Objectives:    Identify and understand what factors may contribute to symptom relapse.    Identify actions that may be taken to manage increased symptoms/stressors.    Create/Review an individualized written relapse plan    Problem solve barriers to plan creation and implementation    Share relapse plan with key support people        Patient Participation / Response:  Moderately participated, sharing some personal reflections / insights and adequately adequately received / provided feedback with other participants.    Demonstrated understanding of topics discussed through group discussion and participation and Expressed understanding of the relevance / importance of coping skills at distressing times in life    Treatment Plan:  Patient has a current master individualized treatment plan.  See Epic treatment plan for more information.    MANOLO Connolly

## 2023-03-10 NOTE — GROUP NOTE
Process Group Note    PATIENT'S NAME: Javid Amaya  MRN:   [id number]  :   1970  ACCT. NUMBER: [id number]  DATE OF SERVICE: 3/10/23  START TIME:  9:00 AM  END TIME:  9:50 AM  FACILITATOR: Janel Andujar LPCC  TOPIC:  Process Group    Diagnoses:  1.  Severe episode of recurrent major depressive disorder, without psychotic features (H)   F33.2      [facility] Partial Hospitalization Program  TRACK: [facility]    NUMBER OF PARTICIPANTS: 6          Data:    Session content: At the start of this group, patients were invited to check in by identifying themselves, describing their current emotional status, and identifying issues to address in this group.   Area(s) of treatment focus addressed in this session included Symptom Management, Personal Safety, Community Resources/Discharge Planning, Develop / Improve Independent Living Skills and Develop Socialization / Interpersonal Relationship Skills.    Patient reported feeling 'down, depressed, anxious.'  Patient discussed working toward getting through the day and 'coming up with a plan the for the weekend.'  Skills they plan to practice to address the goal include exercise, breathing, prayer.  They identified a potential barrier as 'my brain.'  Patient reported no safety concerns or SIB.  Patient reported no substance use.  Patient reported taking medications as prescribed.  Patient reported feeling proud/grateful for family.        Therapeutic Interventions/Treatment Strategies:  Psychotherapist offered support, feedback and validation and reinforced use of skills. Treatment modalities used include Motivational Interviewing. Interventions include Behavioral Activation: Encouraged strategies to reduce individual procrastination and increase motivation by increasing goal-directed activities to enhance mood and reduce symptoms., Relapse Prevention: Facilitated understanding the importance of awareness of factors that contribute to relapse  and  Symptoms Management: Promoted understanding of their diagnoses and how it impacts their functioning.    Assessment:    Patient response:   Patient responded to session by accepting feedback, listening, being attentive and verbalizing understanding    Possible barriers to participation / learning include: and no barriers identified    Health Issues:   None reported       Substance Use Review:   Substance Use: No active concerns identified.    Mental Status/Behavioral Observations  Appearance:   Appropriate   Eye Contact:   Good   Psychomotor Behavior: Normal   Attitude:   Cooperative   Orientation:   All  Speech   Rate / Production: Monotone  Slow    Volume:  Normal   Mood:    Anxious  Depressed   Affect:    Blunted  Flat   Thought Content:   Clear  Thought Form:  Coherent  Logical     Insight:    Fair     Plan:     Safety Plan: No current safety concerns identified.  Recommended that patient call 911 or go to the local ED should there be a change in any of these risk factors.     Barriers to treatment: None identified    Patient Contracts (see media tab):  None    Substance Use: Not addressed in session     Continue or Discharge: Patient is being discharged today. See Treatment Plan and Discharge Summary.       SARAH Dodd  March 10, 2023

## 2023-03-10 NOTE — GROUP NOTE
Psychoeducation Group Note    PATIENT'S NAME: Javid Amaya  MRN:   [id number]  :   1970  ACCT. NUMBER: [id number]  DATE OF SERVICE: 3/10/23  START TIME: 11:00 AM  END TIME: 11:50 AM  FACILITATOR: Cyndee Locke OTR/L  TOPIC: [facility] OT Group: Lifestyle Balance and Structure  [facility] Adult Partial Hospitalization Program  TRACK: 2    NUMBER OF PARTICIPANTS: 4    Summary of Group / Topics Discussed:  Lifestyle Balance and Structure:  OT Goal-setting & integration: Weekend Wellness: The group focused on reflecting on the past week and identifying insights and positive experiences that they want to build upon further.  The group also focused on identifying needs and any concerns for the upcoming weekend and created a list of activities and tasks that they might engage in to help support themselves and add structure their weekend.  Facilitated the sharing of individual insights and plans with validation, support, and feedback provided.    Patient Session Goals / Objectives:    Reflected on insights learned and positive experiences over the last week to help with their recovery and wellbeing    Identified needs and concerns for the upcoming weekend and identified ways to address these    Created a written list of possible ways to structure their weekend    Identified plan to support follow through on plans and reflection on progress made       Patient Participation / Response:  Fully participated with the group by sharing personal reflections / insights and openly received / provided feedback with other participants.    Patient presentation: more active in group discussion than previous sessions; not noticing much progress but peers were able to reflect progress they have seen with him; has plans for the weekend; no safety concerns noted. and Verbalized understanding of content    Treatment Plan:   See Epic Treatment Plan - Patient is discharging.    LOUISE Soriano/IMANI

## 2023-03-10 NOTE — GROUP NOTE
Psychotherapy Group Note    PATIENT'S NAME: Javid Amaya  MRN:   [id number]  :   1970  ACCT. NUMBER: [id number]  DATE OF SERVICE: 3/10/23  START TIME:  1:00 PM  END TIME:  1:50 PM  FACILITATOR: Janel Andujar LPCC  TOPIC: MH EBP Group: Relationship Skills  [facility] Adult Partial Hospitalization Program  TRACK: PHP2    NUMBER OF PARTICIPANTS: 6    Summary of Group / Topics Discussed:  Relationship Skills: Boundaries: Patients were provided with a general overview of interpersonal boundaries and how lack of boundaries relates to symptoms and functioning. The purpose is to help patients identify boundary issues and gain awareness and skills to work towards healthier interpersonal boundaries. Current awareness of healthy boundary characteristics and barriers to establishing healthy boundaries were discussed.    Patient Session Goals / Objectives:    Familiarized patients with the concept of interpersonal boundaries and their characteristics    Discussed and practiced strategies to promote healthier interpersonal boundaries    Identified boundary issues and identified plan to improve boundaries      Patient Participation / Response:  Minimally participated, only when prompted / asked.    Demonstrated understanding of topics discussed through group discussion and participation and Demonstrated understanding of relationship skills and communication skills    Treatment Plan:  Patient has See Epic Treatment Plan - Patient is discharging.    SARAH Dodd

## 2023-03-10 NOTE — PROGRESS NOTES
"[facility]   Adult Mental Health Outpatient Programs  Provider Interval History Note    Program: Partial Hospital Program (track 2, in-person)    PATIENT'S NAME: Javid Amaya  MRN:   [id number]  :   1970  ACCT. NUMBER: [id number]  DATE OF SERVICE: 3/10/23  CALL/VIDEO START TIME: 1410  CALL/VIDEO END TIME: 1430      Interval History:  \"I didn't start the Seroquel.\" Javid presents today for follow-up and ongoing program supervision.   Endorses:    \"My wife had concerns about quetiapine;\"  o Patient had first expressed suicidal ideation when stopping olanzapine  o She was concerned about the possibility of a similar reaction to quetiapine    Discussed upcoming visit with primary care  o \"What should I ask them to look for?\"  - Recommended TSH  - Also that he follow up on physical symptoms, including:    Blurred vision    Decreased sense of balance/coordination    Feeling lightheaded    Patient endorses his wife would be more comfortable with him restarting bupropion  o Discussed treatment considerations with that decision    Also discussed whether an increase in desvenlafaxine would be indicated    Symptoms:    Largely unchanged    Substance use:    None endorsed    Reactions/thoughts about program:    \"It's been good -- it gave me something to do during the day\"      Risk Assessment:    Suicidal ideation: none endorsed    Thoughts of non-suicidal self-injury: none endorsed    Recent self-injurious behavior: none endorsed    Homicidal ideation: none endorsed    Other safety concerns: none endorsed      Medications:  Current Outpatient Medications   Medication Sig Dispense Refill     desvenlafaxine (PRISTIQ) 50 MG 24 hr tablet Take 1 tablet (50 mg) by mouth daily 30 tablet 0     fish oil-omega-3 fatty acids 1000 MG capsule Take 2 g by mouth daily       hydrOXYzine (ATARAX) 25 MG tablet Take 0.5-2 tablets (12.5-50 mg) by mouth 3 times daily as needed for anxiety 60 tablet 2 " "    mirtazapine (REMERON) 15 MG tablet Take 1 tablet (15 mg) by mouth At Bedtime 30 tablet 0     Turmeric (CURCUMIN 95) 500 MG CAPS        valerian root 530 MG CAPS capsule        vitamin C with B complex (B COMPLEX-C) tablet Take 1 tablet by mouth daily       Vitamin D (Cholecalciferol) 25 MCG (1000 UT) TABS          The above list was reviewed with patient today.     Patient is taking medications as prescribed (except for quetiapine per above) and denies adverse effects      Laboratory Results:  Most recent labs reviewed. Pertinent updates/findings: None.     Metrics:  PHQ-9 scores:   PHQ-9 SCORE 7/5/2022 7/19/2022 2/24/2023 2/27/2023   PHQ-9 Total Score MyChart 21 (Severe depression) - 23 (Severe depression) -   PHQ-9 Total Score 21 21 23 22       BENNY-7 scores:   BENNY-7 SCORE 7/20/2022 2/24/2023 2/27/2023   Total Score 13 (moderate anxiety) 10 (moderate anxiety) -   Total Score 13 10 13       CSSR-S: No flowsheet data found.      Mental Status Examination:  Vital Signs: /80 (BP Location: Right arm, Patient Position: Sitting)   Pulse 77   SpO2 98%    Appearance: appropriately groomed, appears stated age, and in no apparent distress.  Attitude: cooperative   Eye Contact: good   Muscle Strength and Tone: no gross abnormalities   Psychomotor Behavior: mild slowing   Gait and Station: normal width, turn, arm swing  Speech: normal rate, production, volume, and rhythm of  Associations: No loosening of associations  Thought Process: coherent and goal directed  Thought Content: no evidence of suicidal ideation or homicidal ideation, no evidence of psychotic thought, no auditory hallucinations present and no visual hallucinations present  Mood: \"anxious and depressed\"  Affect: mood congruent, intensity is blunted, constricted mobility and reactive  Insight: good  Judgment: intact, adequate for safety  Impulse Control: intact  Oriented to: time, place, person and situation  Attention Span and Concentration: " normal  Language: Intact  Recent and Remote Memory: intact to interview. Not formally assessed. No amnesia.  Fund of Knowledge/Assessment of Intelligence: Average  Capacity of Activities of Daily Living: Independent, able to participate in programmatic care services.        Diagnosis/es:    ICD-10-CM    1. Severe episode of recurrent major depressive disorder, without psychotic features (H)  F33.2           Assessment/Plan:  Javid presents today for follow up. Endorses no significant change in depression as a result of the last two weeks in the program or on the increase in mirtazapine. Did not start quetiapine.     Discussed that a check of TSH may be high-yield, unlike imaging of the brain. Change in vision is not typically seen in depression so may bear further workup.    Discussed that SRIs can blunt the anxiogenic effect of bupropion and that I would therefore not predict the same level of anxiety as he saw previously. That being said, a higher dose of desvenlafaxine may a) help better with depression and anxiety both, and b) better protect against activation from bupropion. I might consider that change before adding bupropion. Also discussed that the reintroduction of bupropion could be very slow, even 37.5 mg daily, then twice daily, then slowly increasing to 150 mg XL.     Ultimately I will make no changes to medication as I will not be working with the patient following discharge from the [facility] program today. Per patient request I will share this note with patient's outpatient psychiatric provider, Maria M Foley at [facility].      Depression   o Overall unchanged   o No changes to medication; see above for considerations of next steps  o Discharge from PHP today as planned  o Patient will work with outpatient providers moving forward    Continue all other medications as reviewed per electronic medical record today    Continue therapy as planned:    Enrolled in [facility]  program    Has reached maximum benefit at this level of care    I feel this patient does not meet criteria for an involuntary hold and is appropriate for treatment at an outpatient level of care.    Continue with individual therapist as appropriate    Safety plan reviewed:    To the Emergency Department as needed or call after hours crisis line at [phone] or [phone]. [place] Crisis Text Line: Text [place] to 303679 or Suicide LifeLine Chat: suicideRetroficiency.org/chat    Follow-up:     Follow up with outpatient provider(s) as planned or sooner if needed for acute medical concerns.    Questions or concerns:    Call program line with questions or concerns (see below)    [employer] may be used to communicate with your provider, but this is not intended to be used for emergencies.      [facility] Adult Mental Health Program lines:  [facility]: [phone]  Dual Disorder: [phone]  Adult Day Treatment:  [phone]  55+/Intensive Outpatient: [phone]    Community Resources:    National Suicide Prevention Lifeline: 988 from any phone, or 860-926-0920 (TTY: 604.304.9889). Call anytime for help.  (www.suicidepreventionlifeline.org)  National [place] [employer] Mental Illness (www.collins.org): 955.863.1192 or 909-134-2571.   Mental Health Association (www.mentalhealth.org): 937.804.1059 or 945-406-6844.  [place] Crisis Text Line: Text [place] to 325555  Suicide LifeLine Chat: suicideRetroficiency.org/chat    Treatment Objective(s) Addressed in This Session:  The purpose of today's virtual visit is for this writer to provide oversight of patient's care while receiving program services. Specific treatment goals addressed included personal safety, symptoms stabilization and management, wellness and mental health, and community resources/discharge planning.     This author or another program provider will follow up with the patient as noted above.     Patient agrees with the current plan of  care.    Cassius Olivarez MD  3/10/23      Visit Details:  Type of service: In-person    Start/End Time: see above    Location (patient and provider): [facility] Adult Mental Health Outpatient Programmatic Care Offices    30 minutes spent on the date of the encounter doing chart review, patient visit, documentation and discussion with other provider(s)    This document completed in part using Dragon [employer] dictation software.  Please excuse any inadvertent word or phrase substitutions.

## 2023-03-10 NOTE — GROUP NOTE
Psychotherapy Group Note    PATIENT'S NAME: Javid Amaya  MRN:   [id number]  :   1970  ACCT. NUMBER: [id number]  DATE OF SERVICE: 3/10/23  START TIME:  2:00 PM  END TIME:  2:50 PM  FACILITATOR: Glenna Mathew LICSW  TOPIC:  EBP Group: Enhanced Mindfulness  [facility] Adult Partial Hospitalization Program  TRACK: 2    NUMBER OF PARTICIPANTS: 5    Summary of Group / Topics Discussed:  Enhanced Mindfulness: Body and Mind Integration: Patients received an overview and education regarding the importance of including the body in the management of emotional health and self-care and as a direct route to mindfulness practice.  Patients discussed various ways in which the body can serve as an informant to their physical and emotional experiences/need. Patients discussed the body as a direct link to the present moment and to mindfulness practice.  Patients discussed current relationship with body, self-awareness, mindfulness practice and barriers to connection with body.  Patients were guided through breath work and movement to facilitate greater self-awareness, grounding, self-expression, and connection to other.  Patients discussed how the experiential could be applied to better manage mental health and develop arriaza connection to self.    Patient Session Goals / Objectives:    Identify how movement awareness could be used for grounding, stress management, self-expression, connection to other and self-regulation    Improved awareness of breath and movement preferences    Identify how movement and the body is used in mindfulness practice    Reflect on use of these practices in everyday life.    Identify barriers to attending to body                                                           Patient Participation / Response:  Fully participated with the group by sharing personal reflections / insights and openly received / provided feedback with other participants.    Practiced skills in  session    Treatment Plan:  Patient has See Epic Treatment Plan - Patient is discharging.    Glenna Mathew, [facility] HospitalSW

## 2023-03-10 NOTE — GROUP NOTE
Psychotherapy Group Note    PATIENT'S NAME: Javid Amaya  MRN:   [id number]  :   1970  ACCT. NUMBER: [id number]  DATE OF SERVICE: 3/09/23  START TIME:  1:00 PM  END TIME:  1:50 PM  FACILITATOR: Finn Ramos LMFT  TOPIC:  EBP Group: Self-Awareness  [facility] Adult Partial Hospitalization Program  TRACK: PHP2    NUMBER OF PARTICIPANTS: 7    Summary of Group / Topics Discussed:  Self-Awareness: Grief: Patients were provided with an overview of how personal losses impact their thoughts, feelings, and behaviors. Different stages of grief were discussed, with a focus on the personal and individual experiences of grief as a natural response to loss. The relationship between grief, depression, and anxiety was also discussed. Patients were provided with information regarding different ways of processing grief and shared their personal experiences.     Patient Session Goals / Objectives:    Defined and explored the concept of grief and the grieving process    Discussed relationship between grief, depression, and anxiety     Normalized and recognized the purpose/benefits of the grieving process    Discussed management of the thoughts and feelings associated with grief      Patient Participation / Response:  Moderately participated, sharing some personal reflections / insights and adequately adequately received / provided feedback with other participants.    Demonstrated understanding of topics discussed through group participation    Treatment Plan:  Patient has a current master individualized treatment plan.  See Epic treatment plan for more information.    MANOLO Connolly

## 2023-03-15 ENCOUNTER — TELEPHONE (OUTPATIENT)
Dept: BEHAVIORAL HEALTH | Facility: CLINIC | Age: 53
End: 2023-03-15
Payer: COMMERCIAL

## 2023-03-15 ASSESSMENT — ENCOUNTER SYMPTOMS
PARESTHESIAS: 0
CHILLS: 1
DYSURIA: 0
NAUSEA: 1
HEADACHES: 1
MYALGIAS: 0
JOINT SWELLING: 0
PALPITATIONS: 1
FREQUENCY: 1
SHORTNESS OF BREATH: 1
HEMATURIA: 0
WEAKNESS: 1
EYE PAIN: 1
ARTHRALGIAS: 0
DIARRHEA: 0
COUGH: 0
FEVER: 0
HEMATOCHEZIA: 0
ABDOMINAL PAIN: 0
SORE THROAT: 0
NERVOUS/ANXIOUS: 1
DIZZINESS: 1
HEARTBURN: 0
CONSTIPATION: 0

## 2023-03-15 ASSESSMENT — PATIENT HEALTH QUESTIONNAIRE - PHQ9
SUM OF ALL RESPONSES TO PHQ QUESTIONS 1-9: 22
SUM OF ALL RESPONSES TO PHQ QUESTIONS 1-9: 22
10. IF YOU CHECKED OFF ANY PROBLEMS, HOW DIFFICULT HAVE THESE PROBLEMS MADE IT FOR YOU TO DO YOUR WORK, TAKE CARE OF THINGS AT HOME, OR GET ALONG WITH OTHER PEOPLE: EXTREMELY DIFFICULT

## 2023-03-15 NOTE — TELEPHONE ENCOUNTER
At the request or Dr. Olivaerz, sent a copy of his most recent H&P to Maria M Foley at [facility]  . RAFAEL obtained, quick disclosure completed.

## 2023-03-16 ENCOUNTER — OFFICE VISIT (OUTPATIENT)
Dept: FAMILY MEDICINE | Facility: CLINIC | Age: 53
End: 2023-03-16
Payer: COMMERCIAL

## 2023-03-16 VITALS
TEMPERATURE: 98.4 F | RESPIRATION RATE: 16 BRPM | BODY MASS INDEX: 27.55 KG/M2 | HEART RATE: 75 BPM | HEIGHT: 69 IN | WEIGHT: 186 LBS | SYSTOLIC BLOOD PRESSURE: 121 MMHG | OXYGEN SATURATION: 96 % | DIASTOLIC BLOOD PRESSURE: 83 MMHG

## 2023-03-16 DIAGNOSIS — R06.83 SNORES: ICD-10-CM

## 2023-03-16 DIAGNOSIS — E83.110 HEREDITARY HEMOCHROMATOSIS (H): ICD-10-CM

## 2023-03-16 DIAGNOSIS — Z13.0 SCREENING FOR ENDOCRINE, NUTRITIONAL, METABOLIC AND IMMUNITY DISORDER: ICD-10-CM

## 2023-03-16 DIAGNOSIS — Z13.228 SCREENING FOR ENDOCRINE, NUTRITIONAL, METABOLIC AND IMMUNITY DISORDER: ICD-10-CM

## 2023-03-16 DIAGNOSIS — Z13.29 SCREENING FOR ENDOCRINE, NUTRITIONAL, METABOLIC AND IMMUNITY DISORDER: ICD-10-CM

## 2023-03-16 DIAGNOSIS — Z12.5 SCREENING FOR PROSTATE CANCER: ICD-10-CM

## 2023-03-16 DIAGNOSIS — E78.00 HYPERCHOLESTEREMIA: ICD-10-CM

## 2023-03-16 DIAGNOSIS — R51.9 CHRONIC INTRACTABLE HEADACHE, UNSPECIFIED HEADACHE TYPE: ICD-10-CM

## 2023-03-16 DIAGNOSIS — Z13.21 SCREENING FOR ENDOCRINE, NUTRITIONAL, METABOLIC AND IMMUNITY DISORDER: ICD-10-CM

## 2023-03-16 DIAGNOSIS — F33.2 SEVERE EPISODE OF RECURRENT MAJOR DEPRESSIVE DISORDER, WITHOUT PSYCHOTIC FEATURES (H): ICD-10-CM

## 2023-03-16 DIAGNOSIS — Z12.11 SCREEN FOR COLON CANCER: ICD-10-CM

## 2023-03-16 DIAGNOSIS — Z00.00 ENCOUNTER FOR ROUTINE ADULT HEALTH EXAMINATION WITHOUT ABNORMAL FINDINGS: Primary | ICD-10-CM

## 2023-03-16 DIAGNOSIS — R73.01 ELEVATED FASTING GLUCOSE: ICD-10-CM

## 2023-03-16 DIAGNOSIS — G89.29 CHRONIC INTRACTABLE HEADACHE, UNSPECIFIED HEADACHE TYPE: ICD-10-CM

## 2023-03-16 PROCEDURE — 99396 PREV VISIT EST AGE 40-64: CPT | Performed by: FAMILY MEDICINE

## 2023-03-16 PROCEDURE — 99214 OFFICE O/P EST MOD 30 MIN: CPT | Mod: 25 | Performed by: FAMILY MEDICINE

## 2023-03-16 RX ORDER — BUPROPION HYDROCHLORIDE 150 MG/1
150 TABLET ORAL EVERY MORNING
COMMUNITY
Start: 2023-03-13

## 2023-03-16 ASSESSMENT — ANXIETY QUESTIONNAIRES
8. IF YOU CHECKED OFF ANY PROBLEMS, HOW DIFFICULT HAVE THESE MADE IT FOR YOU TO DO YOUR WORK, TAKE CARE OF THINGS AT HOME, OR GET ALONG WITH OTHER PEOPLE?: EXTREMELY DIFFICULT
7. FEELING AFRAID AS IF SOMETHING AWFUL MIGHT HAPPEN: NOT AT ALL
7. FEELING AFRAID AS IF SOMETHING AWFUL MIGHT HAPPEN: NOT AT ALL
IF YOU CHECKED OFF ANY PROBLEMS ON THIS QUESTIONNAIRE, HOW DIFFICULT HAVE THESE PROBLEMS MADE IT FOR YOU TO DO YOUR WORK, TAKE CARE OF THINGS AT HOME, OR GET ALONG WITH OTHER PEOPLE: EXTREMELY DIFFICULT
GAD7 TOTAL SCORE: 8
GAD7 TOTAL SCORE: 8
3. WORRYING TOO MUCH ABOUT DIFFERENT THINGS: SEVERAL DAYS
5. BEING SO RESTLESS THAT IT IS HARD TO SIT STILL: NOT AT ALL
1. FEELING NERVOUS, ANXIOUS, OR ON EDGE: NEARLY EVERY DAY
2. NOT BEING ABLE TO STOP OR CONTROL WORRYING: SEVERAL DAYS
GAD7 TOTAL SCORE: 8
6. BECOMING EASILY ANNOYED OR IRRITABLE: NOT AT ALL
4. TROUBLE RELAXING: NEARLY EVERY DAY

## 2023-03-16 ASSESSMENT — ENCOUNTER SYMPTOMS
WEAKNESS: 1
NAUSEA: 1
EYE PAIN: 1
DIZZINESS: 1
DIARRHEA: 0
JOINT SWELLING: 0
SORE THROAT: 0
ARTHRALGIAS: 0
HEADACHES: 1
CHILLS: 1
HEMATOCHEZIA: 0
DYSURIA: 0
HEARTBURN: 0
NERVOUS/ANXIOUS: 1
ABDOMINAL PAIN: 0
HEMATURIA: 0
FEVER: 0
CONSTIPATION: 0
PALPITATIONS: 1
COUGH: 0
PARESTHESIAS: 0
MYALGIAS: 0
SHORTNESS OF BREATH: 1
FREQUENCY: 1

## 2023-03-16 NOTE — ASSESSMENT & PLAN NOTE
Ongoing concern with both psychology and psychiatry actively managing medications.  Symptoms have been quite recalcitrant and debilitating.  Head MRI as discussed elsewhere.  Ongoing passive suicidal ideation.  No history of suicidal gestures or self-injurious behavior.  For now, continue treatment plan.  No obvious abnormalities of laboratory testing that would explain his symptoms.

## 2023-03-16 NOTE — ASSESSMENT & PLAN NOTE
The patient had some testing consistent with metabolic disease.  However, at that time he was on olanzapine.  He describes a diet that is quite healthy.  Predominantly whole foods and appropriate portions.  He exercises multiple types of fitness multiple times per week.  He focuses on sleep.  We did not repeat laboratory testing but we would consider doing so in 3 to 6 months.
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
## 2023-03-23 ENCOUNTER — LAB (OUTPATIENT)
Dept: LAB | Facility: CLINIC | Age: 53
End: 2023-03-23
Payer: COMMERCIAL

## 2023-03-23 DIAGNOSIS — Z13.29 SCREENING FOR ENDOCRINE, NUTRITIONAL, METABOLIC AND IMMUNITY DISORDER: ICD-10-CM

## 2023-03-23 DIAGNOSIS — Z13.228 SCREENING FOR ENDOCRINE, NUTRITIONAL, METABOLIC AND IMMUNITY DISORDER: ICD-10-CM

## 2023-03-23 DIAGNOSIS — Z12.5 SCREENING FOR PROSTATE CANCER: ICD-10-CM

## 2023-03-23 DIAGNOSIS — Z13.21 SCREENING FOR ENDOCRINE, NUTRITIONAL, METABOLIC AND IMMUNITY DISORDER: ICD-10-CM

## 2023-03-23 DIAGNOSIS — E83.110 HEREDITARY HEMOCHROMATOSIS (H): ICD-10-CM

## 2023-03-23 DIAGNOSIS — Z13.0 SCREENING FOR ENDOCRINE, NUTRITIONAL, METABOLIC AND IMMUNITY DISORDER: ICD-10-CM

## 2023-03-23 LAB
FERRITIN SERPL-MCNC: 98 NG/ML (ref 31–409)
HGB BLD-MCNC: 16.5 G/DL (ref 13.3–17.7)
PSA SERPL DL<=0.01 NG/ML-MCNC: 0.63 NG/ML (ref 0–3.5)
TSH SERPL DL<=0.005 MIU/L-ACNC: 0.82 UIU/ML (ref 0.3–4.2)

## 2023-03-23 PROCEDURE — 85018 HEMOGLOBIN: CPT

## 2023-03-23 PROCEDURE — 84443 ASSAY THYROID STIM HORMONE: CPT

## 2023-03-23 PROCEDURE — 82728 ASSAY OF FERRITIN: CPT

## 2023-03-23 PROCEDURE — 36415 COLL VENOUS BLD VENIPUNCTURE: CPT

## 2023-03-23 PROCEDURE — G0103 PSA SCREENING: HCPCS

## 2023-03-29 ENCOUNTER — PATIENT OUTREACH (OUTPATIENT)
Dept: ONCOLOGY | Facility: CLINIC | Age: 53
End: 2023-03-29
Payer: COMMERCIAL

## 2023-03-29 NOTE — CONFIDENTIAL NOTE
Patient was a no show for phlebotomy on 3/24 despite meeting parameters of Hgb > 11 and Ferritin >50. Patient states he thought it was for ferritin >100 and he was at 98. Per patient request, will recheck labs in 1 month with possible phlebotomy and see where he is at then. Patient states he is feeling well right now. Kallie Meza RN on 3/29/2023 at 4:19 PM

## 2023-03-31 ENCOUNTER — HOSPITAL ENCOUNTER (OUTPATIENT)
Dept: MRI IMAGING | Facility: CLINIC | Age: 53
Discharge: HOME | End: 2023-03-31
Attending: FAMILY MEDICINE | Admitting: FAMILY MEDICINE
Payer: COMMERCIAL

## 2023-03-31 DIAGNOSIS — G89.29 CHRONIC INTRACTABLE HEADACHE, UNSPECIFIED HEADACHE TYPE: ICD-10-CM

## 2023-03-31 DIAGNOSIS — F33.2 SEVERE EPISODE OF RECURRENT MAJOR DEPRESSIVE DISORDER, WITHOUT PSYCHOTIC FEATURES (H): ICD-10-CM

## 2023-03-31 DIAGNOSIS — R51.9 CHRONIC INTRACTABLE HEADACHE, UNSPECIFIED HEADACHE TYPE: ICD-10-CM

## 2023-03-31 PROCEDURE — 70551 MRI BRAIN STEM W/O DYE: CPT

## 2023-04-01 DIAGNOSIS — J34.1 MUCOUS RETENTION CYST OF MAXILLARY SINUS: Primary | ICD-10-CM

## 2023-04-03 ENCOUNTER — TELEPHONE (OUTPATIENT)
Dept: OTOLARYNGOLOGY | Facility: CLINIC | Age: 53
End: 2023-04-03
Payer: COMMERCIAL

## 2023-04-03 NOTE — TELEPHONE ENCOUNTER
[facility] Call Center    Phone Message    May a detailed message be left on voicemail: yes     Reason for Call: Other: per protocol Mucous retention cyst of maxillary sinus [J34.1] if not scheduled within 3 weeks send high priority message for assistance in scheduling. Thank youi     Action Taken: Other: ENT    Travel Screening: Not Applicable

## 2023-04-04 NOTE — TELEPHONE ENCOUNTER
MRI completed 3/31/23: There is a probable mucous retention cyst in the left maxillary sinus measuring up to 4.4 cm in the craniocaudal dimension. There may be a smaller retention cyst in the alveolar recess of the right maxillary sinus. Elsewhere, mild scattered paranasal sinus mucosal thickening. The calvarium, skull base, and midface otherwise appear grossly unremarkable, accounting for technique.    High priority referral sent due to ongoing headaches, tinnitus, disequilibrium, and large (probably) mucous retention cyst in left maxillary sinus.     No provider onsite this week in [place].   Routing to other clinics for possible openings.   Message has been sent to PCP regarding appointment status in [place] and informed other clinics have been contacted.     Thank you,   Rachael TABARES RN  [facility]

## 2023-04-05 NOTE — TELEPHONE ENCOUNTER
Spoke to patient in regards to scheduled appt and the request for sooner appt per staff. [place] [facility] [place] Pt was given clinic information. Pt verbalized understanding of information given and appreciative of call.

## 2023-04-05 NOTE — TELEPHONE ENCOUNTER
FUTURE VISIT INFORMATION      FUTURE VISIT INFORMATION:    Date: 4/28/23    Time: 8am    Location: [facility] [place]  REFERRAL INFORMATION:    Referring provider:  Sanjay Mahan MD    Referring providers clinic:  [facility]    Reason for visit/diagnosis  Mucous retention cyst of maxillary sinus [J34.1]    RECORDS REQUESTED FROM:       Clinic name Comments Records Status Imaging Status   [facility]  3/16/23- note with  Sanjay Mahan MD Epic     Imaging  3/31/23- mr brain  Epic  PACS

## 2023-04-24 ENCOUNTER — INFUSION THERAPY VISIT (OUTPATIENT)
Dept: INFUSION THERAPY | Facility: CLINIC | Age: 53
End: 2023-04-24
Attending: NURSE PRACTITIONER
Payer: COMMERCIAL

## 2023-04-24 ENCOUNTER — APPOINTMENT (OUTPATIENT)
Dept: LAB | Facility: CLINIC | Age: 53
End: 2023-04-24
Attending: NURSE PRACTITIONER
Payer: COMMERCIAL

## 2023-04-24 VITALS
RESPIRATION RATE: 18 BRPM | HEART RATE: 73 BPM | SYSTOLIC BLOOD PRESSURE: 118 MMHG | TEMPERATURE: 98 F | DIASTOLIC BLOOD PRESSURE: 80 MMHG

## 2023-04-24 DIAGNOSIS — E83.110 HEREDITARY HEMOCHROMATOSIS (H): Primary | ICD-10-CM

## 2023-04-24 LAB
FERRITIN SERPL-MCNC: 116 NG/ML (ref 31–409)
HCT VFR BLD AUTO: 46.7 % (ref 40–53)
HGB BLD-MCNC: 16.2 G/DL (ref 13.3–17.7)

## 2023-04-24 PROCEDURE — 82728 ASSAY OF FERRITIN: CPT | Performed by: NURSE PRACTITIONER

## 2023-04-24 PROCEDURE — 36415 COLL VENOUS BLD VENIPUNCTURE: CPT | Performed by: NURSE PRACTITIONER

## 2023-04-24 PROCEDURE — 85018 HEMOGLOBIN: CPT | Performed by: NURSE PRACTITIONER

## 2023-04-24 PROCEDURE — 99195 PHLEBOTOMY: CPT

## 2023-04-24 RX ORDER — HEPARIN SODIUM (PORCINE) LOCK FLUSH IV SOLN 100 UNIT/ML 100 UNIT/ML
5 SOLUTION INTRAVENOUS
Status: CANCELLED | OUTPATIENT
Start: 2023-05-31

## 2023-04-24 RX ORDER — HEPARIN SODIUM,PORCINE 10 UNIT/ML
5 VIAL (ML) INTRAVENOUS
Status: CANCELLED | OUTPATIENT
Start: 2023-05-31

## 2023-04-24 ASSESSMENT — PAIN SCALES - GENERAL: PAINLEVEL: NO PAIN (0)

## 2023-04-24 NOTE — PROGRESS NOTES
Infusion Nursing Note:  Javid Amaya presents today for Phlebotomy.    Patient seen by provider today: No   present during visit today: Not Applicable.    Note: Patient wondering if phlebotomies should be more frequently since he missed his last one and ferritin ins currently 116. Message snet to careteam.    Intravenous Access:  Phlebotomy needle placed in right AC 500cc whole blood removed without incident.    Treatment Conditions:  Lab Results   Component Value Date    HGB 16.2 04/24/2023    WBC 6.6 06/01/2022    ANEUTAUTO 4.8 06/01/2022     06/01/2022   Results reviewed, labs MET treatment parameters, ok to proceed with treatment.  Ferritin 116.    Post Infusion Assessment:  Patient tolerated phlebotomy without incident.  Site patent and intact, free from redness, edema or discomfort.  No evidence of extravasations.  Access discontinued per protocol.     Discharge Plan:   Discharge instructions reviewed with: Patient.  Patient and/or family verbalized understanding of discharge instructions and all questions answered.  AVS to patient via [employer].  Patient will return in 3 months for next appointment.   Patient discharged in stable condition accompanied by: self.  Departure Mode: Ambulatory.    Damari Esparza RN

## 2023-04-25 ENCOUNTER — TELEPHONE (OUTPATIENT)
Dept: ONCOLOGY | Facility: CLINIC | Age: 53
End: 2023-04-25
Payer: COMMERCIAL

## 2023-04-25 NOTE — TELEPHONE ENCOUNTER
----- Message from Dawna Caballero NP sent at 4/25/2023  1:16 PM CDT -----  He needs 1 yr follow-up scheduled. Please schedule him with me or a MD in next few months and we can decide. Can schedule phlebotomy after visit   ----- Message -----  From: Kallie Meza RN  Sent: 4/25/2023   9:19 AM CDT  To: CLOTILDE Dai,  Please see attached  message and let me know your thoughts.  Thanks!  Izzy  ----- Message -----  From: Damari Esparza RN  Sent: 4/24/2023   9:02 AM CDT  To: Dawna Caballero NP; Kallie Meza RN    Javid was a patient of Darabi's. He missed his phlebotomy last month. Today Ferritin was 116. He does not feel symptomatic when his ferritin is elevated. He is wondering if he should come in more frequently than every 3 months until his ferritin is closer to 50.   Can you please reach out to him and let him know? Patient states a [employer] message is ok.   Thanks  EVELINA Slade
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
## 2023-04-28 ENCOUNTER — OFFICE VISIT (OUTPATIENT)
Dept: OTOLARYNGOLOGY | Facility: CLINIC | Age: 53
End: 2023-04-28
Attending: FAMILY MEDICINE
Payer: COMMERCIAL

## 2023-04-28 ENCOUNTER — PRE VISIT (OUTPATIENT)
Dept: OTOLARYNGOLOGY | Facility: CLINIC | Age: 53
End: 2023-04-28

## 2023-04-28 VITALS
OXYGEN SATURATION: 98 % | DIASTOLIC BLOOD PRESSURE: 75 MMHG | BODY MASS INDEX: 28.19 KG/M2 | HEIGHT: 68 IN | HEART RATE: 74 BPM | WEIGHT: 186 LBS | SYSTOLIC BLOOD PRESSURE: 115 MMHG

## 2023-04-28 DIAGNOSIS — J34.1 MUCOUS RETENTION CYST OF MAXILLARY SINUS: ICD-10-CM

## 2023-04-28 DIAGNOSIS — F33.2 SEVERE EPISODE OF RECURRENT MAJOR DEPRESSIVE DISORDER, WITHOUT PSYCHOTIC FEATURES (H): ICD-10-CM

## 2023-04-28 DIAGNOSIS — G50.1 ATYPICAL FACIAL PAIN: Primary | ICD-10-CM

## 2023-04-28 PROCEDURE — 99203 OFFICE O/P NEW LOW 30 MIN: CPT | Performed by: OTOLARYNGOLOGY

## 2023-04-28 ASSESSMENT — PAIN SCALES - GENERAL: PAINLEVEL: NO PAIN (0)
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
## 2023-04-28 NOTE — PATIENT INSTRUCTIONS
You were seen in the ENT Clinic today by Dr. Treadwell. If you have any questions or concerns after your appointment, please contact us (see below)       2.   The following recommendations have been made based upon your appointment today:   - Please return to the ENT clinic as needed           How to Contact Us:  Send a [employer] message to your provider. Our team will respond to you via [employer]. Occasionally, we will need to call you to get further information.  For urgent matters (Monday-Friday), call the ENT Clinic: [phone] and speak with a call center team member - they will route your call appropriately.   If you'd like to speak directly with a nurse, please find our contact information below. We do our best to check voicemail frequently throughout the day, and will work to call you back within 1-2 days. For urgent matters, please use the general clinic phone numbers listed above.        Marietta GARCIA RN  ENT RN Care Coordinator  Direct: [phone]  Diamante LEE LPN  Direct: [phone]         [facility]  Department of Otolaryngology

## 2023-04-28 NOTE — NURSING NOTE
"Chief Complaint   Patient presents with     Consult     Mucus retiention cyst of maxillary sius       Blood pressure 115/75, pulse 74, height 1.727 m (5' 8\"), weight 84.4 kg (186 lb), SpO2 98 %.    Gilma Martinez, EMT    "

## 2023-05-02 ENCOUNTER — TELEPHONE (OUTPATIENT)
Dept: OTOLARYNGOLOGY | Facility: CLINIC | Age: 53
End: 2023-05-02

## 2023-05-02 NOTE — TELEPHONE ENCOUNTER
[facility] Call Center    Phone Message    May a detailed message be left on voicemail: yes     Reason for Call: Other: Patient doctor calling would like to comment on cyst and get feedback from . Please contact to discuss. Thank you     Action Taken: Other: ENT    Travel Screening: Not Applicable                                                                      
(4) no impairment

## 2023-05-15 ASSESSMENT — ANXIETY QUESTIONNAIRES
1. FEELING NERVOUS, ANXIOUS, OR ON EDGE: NEARLY EVERY DAY
3. WORRYING TOO MUCH ABOUT DIFFERENT THINGS: MORE THAN HALF THE DAYS
2. NOT BEING ABLE TO STOP OR CONTROL WORRYING: MORE THAN HALF THE DAYS
GAD7 TOTAL SCORE: 10
GAD7 TOTAL SCORE: 10
6. BECOMING EASILY ANNOYED OR IRRITABLE: NOT AT ALL
IF YOU CHECKED OFF ANY PROBLEMS ON THIS QUESTIONNAIRE, HOW DIFFICULT HAVE THESE PROBLEMS MADE IT FOR YOU TO DO YOUR WORK, TAKE CARE OF THINGS AT HOME, OR GET ALONG WITH OTHER PEOPLE: EXTREMELY DIFFICULT
5. BEING SO RESTLESS THAT IT IS HARD TO SIT STILL: NOT AT ALL
4. TROUBLE RELAXING: NEARLY EVERY DAY
7. FEELING AFRAID AS IF SOMETHING AWFUL MIGHT HAPPEN: NOT AT ALL
7. FEELING AFRAID AS IF SOMETHING AWFUL MIGHT HAPPEN: NOT AT ALL
8. IF YOU CHECKED OFF ANY PROBLEMS, HOW DIFFICULT HAVE THESE MADE IT FOR YOU TO DO YOUR WORK, TAKE CARE OF THINGS AT HOME, OR GET ALONG WITH OTHER PEOPLE?: EXTREMELY DIFFICULT

## 2023-05-15 ASSESSMENT — PATIENT HEALTH QUESTIONNAIRE - PHQ9
10. IF YOU CHECKED OFF ANY PROBLEMS, HOW DIFFICULT HAVE THESE PROBLEMS MADE IT FOR YOU TO DO YOUR WORK, TAKE CARE OF THINGS AT HOME, OR GET ALONG WITH OTHER PEOPLE: EXTREMELY DIFFICULT
SUM OF ALL RESPONSES TO PHQ QUESTIONS 1-9: 18
SUM OF ALL RESPONSES TO PHQ QUESTIONS 1-9: 18

## 2023-05-16 ENCOUNTER — OFFICE VISIT (OUTPATIENT)
Dept: FAMILY MEDICINE | Facility: CLINIC | Age: 53
End: 2023-05-16
Payer: COMMERCIAL

## 2023-05-16 VITALS
HEART RATE: 69 BPM | SYSTOLIC BLOOD PRESSURE: 114 MMHG | BODY MASS INDEX: 27.79 KG/M2 | WEIGHT: 183.38 LBS | RESPIRATION RATE: 16 BRPM | OXYGEN SATURATION: 99 % | TEMPERATURE: 97.7 F | DIASTOLIC BLOOD PRESSURE: 81 MMHG | HEIGHT: 68 IN

## 2023-05-16 DIAGNOSIS — Z13.0 SCREENING FOR ENDOCRINE, NUTRITIONAL, METABOLIC AND IMMUNITY DISORDER: ICD-10-CM

## 2023-05-16 DIAGNOSIS — Z13.1 SCREENING FOR DIABETES MELLITUS: ICD-10-CM

## 2023-05-16 DIAGNOSIS — R73.01 ELEVATED FASTING GLUCOSE: ICD-10-CM

## 2023-05-16 DIAGNOSIS — Z13.228 SCREENING FOR ENDOCRINE, NUTRITIONAL, METABOLIC AND IMMUNITY DISORDER: ICD-10-CM

## 2023-05-16 DIAGNOSIS — G89.29 CHRONIC INTRACTABLE HEADACHE, UNSPECIFIED HEADACHE TYPE: ICD-10-CM

## 2023-05-16 DIAGNOSIS — E63.9 NUTRITIONAL DEFICIENCY: ICD-10-CM

## 2023-05-16 DIAGNOSIS — E83.110 HEREDITARY HEMOCHROMATOSIS (H): ICD-10-CM

## 2023-05-16 DIAGNOSIS — Z13.29 SCREENING FOR ENDOCRINE, NUTRITIONAL, METABOLIC AND IMMUNITY DISORDER: ICD-10-CM

## 2023-05-16 DIAGNOSIS — Z13.0 SCREENING FOR DEFICIENCY ANEMIA: ICD-10-CM

## 2023-05-16 DIAGNOSIS — R51.9 CHRONIC INTRACTABLE HEADACHE, UNSPECIFIED HEADACHE TYPE: ICD-10-CM

## 2023-05-16 DIAGNOSIS — Z13.220 SCREENING FOR LIPID DISORDERS: ICD-10-CM

## 2023-05-16 DIAGNOSIS — E78.00 HYPERCHOLESTEREMIA: ICD-10-CM

## 2023-05-16 DIAGNOSIS — R53.83 FATIGUE, UNSPECIFIED TYPE: ICD-10-CM

## 2023-05-16 DIAGNOSIS — F33.2 SEVERE EPISODE OF RECURRENT MAJOR DEPRESSIVE DISORDER, WITHOUT PSYCHOTIC FEATURES (H): Primary | ICD-10-CM

## 2023-05-16 DIAGNOSIS — Z13.21 SCREENING FOR ENDOCRINE, NUTRITIONAL, METABOLIC AND IMMUNITY DISORDER: ICD-10-CM

## 2023-05-16 PROCEDURE — 99214 OFFICE O/P EST MOD 30 MIN: CPT | Performed by: FAMILY MEDICINE

## 2023-05-16 ASSESSMENT — PATIENT HEALTH QUESTIONNAIRE - PHQ9
SUM OF ALL RESPONSES TO PHQ QUESTIONS 1-9: 18
10. IF YOU CHECKED OFF ANY PROBLEMS, HOW DIFFICULT HAVE THESE PROBLEMS MADE IT FOR YOU TO DO YOUR WORK, TAKE CARE OF THINGS AT HOME, OR GET ALONG WITH OTHER PEOPLE: EXTREMELY DIFFICULT

## 2023-05-16 ASSESSMENT — ENCOUNTER SYMPTOMS: CONSTITUTIONAL NEGATIVE: 1

## 2023-05-16 ASSESSMENT — ANXIETY QUESTIONNAIRES: GAD7 TOTAL SCORE: 10

## 2023-05-16 NOTE — PROGRESS NOTES
Assessment & Plan   Problem List Items Addressed This Visit     Chronic intractable headache, unspecified headache type    Elevated fasting glucose    Relevant Orders    Insulin level    Basic metabolic panel  (Ca, Cl, CO2, Creat, Gluc, K, Na, BUN)    Fatigue, unspecified type     Patient presents for assistance in having labs drawn for functional medicine assessment.  Has been struggling with mood changes including severe debilitating depression and overwhelming fatigue.  He describes symptoms of anhedonia and fatigue.  He has had a number of medication changes.  He is done intensive outpatient psychotherapy programs.  He is frustrated that he has not made any progress.  He is working with a functional medicine practitioner who requested a number of labs.  Reviewed that we could complete these labs through our system although not sure about cost.  Orders were placed.         Relevant Orders    Insulin level    CBC with platelets and differential    Insulin Growth Factor 1 by Immunoassay    Leptin Quantitative    Coenzyme Q10 total    CK total    Lactate Dehydrogenase    Uric acid    Homocysteine    Fibrinogen Antigen    CRP cardiac risk    ESR: Erythrocyte sedimentation rate    Vitamin D Deficiency    Vitamin B12    Folate    Lipid panel reflex to direct LDL Fasting    TSH    T4, free    T3, total    Thyroxine total    T3, Free    Basic metabolic panel  (Ca, Cl, CO2, Creat, Gluc, K, Na, BUN)    Hepatic panel (Albumin, ALT, AST, Bili, Alk Phos, TP)    GGT    Osmolality    Magnesium    Phosphorus    Cystatin C with GFR    Iron and iron binding capacity    IgA    IgG    IgM    Hereditary hemochromatosis (H)    Relevant Orders    CBC with platelets and differential    Iron and iron binding capacity    Hypercholesteremia    Relevant Orders    Lipid panel reflex to direct LDL Fasting    Severe episode of recurrent major depressive disorder, without psychotic features (H) - Primary   Other Visit Diagnoses      Nutritional deficiency        Relevant Orders    Insulin level    CBC with platelets and differential    Insulin Growth Factor 1 by Immunoassay    Leptin Quantitative    Coenzyme Q10 total    CK total    Lactate Dehydrogenase    Uric acid    Homocysteine    Fibrinogen Antigen    CRP cardiac risk    ESR: Erythrocyte sedimentation rate    Vitamin D Deficiency    Vitamin B12    Folate    Lipid panel reflex to direct LDL Fasting    TSH    T4, free    T3, total    Thyroxine total    T3, Free    Basic metabolic panel  (Ca, Cl, CO2, Creat, Gluc, K, Na, BUN)    Hepatic panel (Albumin, ALT, AST, Bili, Alk Phos, TP)    GGT    Osmolality    Magnesium    Phosphorus    Cystatin C with GFR    Iron and iron binding capacity    IgA    IgG    IgM    Screening for endocrine, nutritional, metabolic and immunity disorder        Relevant Orders    Insulin level    CBC with platelets and differential    Insulin Growth Factor 1 by Immunoassay    Leptin Quantitative    Coenzyme Q10 total    CK total    Lactate Dehydrogenase    Uric acid    Homocysteine    Fibrinogen Antigen    CRP cardiac risk    ESR: Erythrocyte sedimentation rate    Vitamin D Deficiency    Vitamin B12    Folate    Lipid panel reflex to direct LDL Fasting    TSH    T4, free    T3, total    Thyroxine total    T3, Free    Basic metabolic panel  (Ca, Cl, CO2, Creat, Gluc, K, Na, BUN)    Hepatic panel (Albumin, ALT, AST, Bili, Alk Phos, TP)    GGT    Osmolality    Magnesium    Phosphorus    Cystatin C with GFR    Iron and iron binding capacity    IgA    IgG    IgM    Screening for diabetes mellitus        Relevant Orders    Insulin level    Basic metabolic panel  (Ca, Cl, CO2, Creat, Gluc, K, Na, BUN)    Screening for lipid disorders        Screening for deficiency anemia        Relevant Orders    CBC with platelets and differential        37 minutes of time was spent in review of the EHR, meeting with the patient and and documentation.  This occurred on the date of  service.    Sanjay Mahan MD  [facility] MARLENE Hua is a 52 year old, presenting for the following health issues:  Depression        5/16/2023     7:01 AM   Additional Questions   Roomed by SAC   Accompanied by self         5/16/2023     7:01 AM   Patient Reported Additional Medications   Patient reports taking the following new medications no     Depression:   - seeing a functional medicine practitioner who recommended a metabolic work-up including extensive lab work.  He was hoping that we could complete the labs through our health system.  He is not sure if recent medication changes have been helpful.  His family tries to keep an active and his sons will come and help him to work out on a regular basis.  He is then tired throughout the day.  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  He tells me that he did a similar functional medicine assessment a number years ago as well.    History of Present Illness       Mental Health Follow-up:  Patient presents to follow-up on Depression & Anxiety.Patient's depression since last visit has been:  No change  The patient is having other symptoms associated with depression.  Patient's anxiety since last visit has been:  No change  The patient is having other symptoms associated with anxiety.  Any significant life events: No  Patient is not feeling anxious or having panic attacks.  Patient has no concerns about alcohol or drug use.    He eats 2-3 servings of fruits and vegetables daily.He consumes 0 sweetened beverage(s) daily.He exercises with enough effort to increase his heart rate 10 to 19 minutes per day.  He exercises with enough effort to increase his heart rate 5 days per week.   He is taking medications regularly.    Today's PHQ-9         PHQ-9 Total Score: 18    PHQ-9 Q9 Thoughts of better off dead/self-harm past 2 weeks :   Not at all    How difficult have these problems made it for you  "to do your work, take care of things at home, or get along with other people: Extremely difficult  Today's BENNY-7 Score: 10    Review of Systems   Constitutional: Negative.    All other systems reviewed and are negative.         Objective    /81 (BP Location: Left arm, Patient Position: Sitting, Cuff Size: Adult Large)   Pulse 69   Temp 97.7  F (36.5  C) (Oral)   Resp 16   Ht 1.727 m (5' 8\")   Wt 83.2 kg (183 lb 6 oz)   SpO2 99%   BMI 27.88 kg/m    Body mass index is 27.88 kg/m .  Physical Exam  Nursing note reviewed.   Constitutional:       General: He is not in acute distress.     Appearance: Normal appearance. He is not ill-appearing.   HENT:      Head: Normocephalic and atraumatic.   Eyes:      Extraocular Movements: Extraocular movements intact.      Conjunctiva/sclera: Conjunctivae normal.   Pulmonary:      Effort: Pulmonary effort is normal.   Neurological:      Mental Status: He is alert and oriented to person, place, and time.   Psychiatric:         Attention and Perception: Attention normal.         Mood and Affect: Mood normal. Affect is blunt.         Speech: Speech normal.         Thought Content: Thought content normal.                        "

## 2023-05-16 NOTE — ASSESSMENT & PLAN NOTE
Patient presents for assistance in having labs drawn for functional medicine assessment.  Has been struggling with mood changes including severe debilitating depression and overwhelming fatigue.  He describes symptoms of anhedonia and fatigue.  He has had a number of medication changes.  He is done intensive outpatient psychotherapy programs.  He is frustrated that he has not made any progress.  He is working with a functional medicine practitioner who requested a number of labs.  Reviewed that we could complete these labs through our system although not sure about cost.  Orders were placed.

## 2023-05-23 ENCOUNTER — INFUSION THERAPY VISIT (OUTPATIENT)
Dept: INFUSION THERAPY | Facility: CLINIC | Age: 53
End: 2023-05-23
Attending: NURSE PRACTITIONER
Payer: COMMERCIAL

## 2023-05-23 ENCOUNTER — ONCOLOGY VISIT (OUTPATIENT)
Dept: ONCOLOGY | Facility: CLINIC | Age: 53
End: 2023-05-23
Attending: NURSE PRACTITIONER
Payer: COMMERCIAL

## 2023-05-23 ENCOUNTER — APPOINTMENT (OUTPATIENT)
Dept: LAB | Facility: CLINIC | Age: 53
End: 2023-05-23
Attending: NURSE PRACTITIONER
Payer: COMMERCIAL

## 2023-05-23 VITALS
WEIGHT: 179 LBS | HEART RATE: 79 BPM | OXYGEN SATURATION: 100 % | RESPIRATION RATE: 12 BRPM | HEIGHT: 68 IN | DIASTOLIC BLOOD PRESSURE: 78 MMHG | TEMPERATURE: 98.4 F | BODY MASS INDEX: 27.13 KG/M2 | SYSTOLIC BLOOD PRESSURE: 115 MMHG

## 2023-05-23 VITALS — DIASTOLIC BLOOD PRESSURE: 86 MMHG | HEART RATE: 72 BPM | SYSTOLIC BLOOD PRESSURE: 119 MMHG

## 2023-05-23 DIAGNOSIS — E83.110 HEREDITARY HEMOCHROMATOSIS (H): Primary | ICD-10-CM

## 2023-05-23 LAB
FERRITIN SERPL-MCNC: 95 NG/ML (ref 31–409)
HCT VFR BLD AUTO: 43 % (ref 40–53)
HGB BLD-MCNC: 15.2 G/DL (ref 13.3–17.7)

## 2023-05-23 PROCEDURE — 82728 ASSAY OF FERRITIN: CPT | Performed by: NURSE PRACTITIONER

## 2023-05-23 PROCEDURE — G0463 HOSPITAL OUTPT CLINIC VISIT: HCPCS | Performed by: NURSE PRACTITIONER

## 2023-05-23 PROCEDURE — 99215 OFFICE O/P EST HI 40 MIN: CPT | Performed by: NURSE PRACTITIONER

## 2023-05-23 PROCEDURE — 36415 COLL VENOUS BLD VENIPUNCTURE: CPT | Performed by: NURSE PRACTITIONER

## 2023-05-23 PROCEDURE — 85018 HEMOGLOBIN: CPT | Performed by: NURSE PRACTITIONER

## 2023-05-23 ASSESSMENT — PAIN SCALES - GENERAL: PAINLEVEL: NO PAIN (0)

## 2023-05-23 NOTE — PROGRESS NOTES
Infusion Nursing Note:  Javid Amaya presents today for phlebotomy.    Patient seen by provider today: Yes   present during visit today: Not Applicable.    Note: Labs drawn peripherally.      Intravenous Access:  16 gauge phleb needle.    Treatment Conditions:  Results reviewed, labs MET treatment parameters, ok to proceed with treatment.      Post Procedure Assessment:  500 mL whole blood collected. B/P stable pre & post phleb. Patient taking PO fluids. Site patent and intact, free from redness, edema or discomfort.  No evidence of extravasations.  Access discontinued per protocol.       Discharge Plan:   Patient discharged in stable condition accompanied by: self.  Departure Mode: Ambulatory.      Edwardo Luciano RN

## 2023-05-23 NOTE — PROGRESS NOTES
[facility] Hematology and Oncology Outpatient Progress Note    Patient: Javid Amaya  MRN: [id number]  Date of Service: May 23, 2023          Reason for Visit    1. Hereditary hemochromatosis    Primary Hematologist: formerly, Dr. Haynes (will establish with Dr. Lopez)      Assessment/Plan  1. Hereditary hemochromatosis (homozygous C282Y mutation)  Javid was diagnosed 2 yrs ago at [facility]. He then transferred his care here with Dr. Haynes last year.   He has been on maintenance phlebotomies every 3 mths for goal ferritin <50; his ferritin has been  on average.   Tolerates phlebotomies well.     I reviewed plan of care with Dr. Lopez today, who will establish as primary at an upcoming visit.     Plan:  -Every 3 mth hgb and ferritin with phlebotomy parameters: ferritin >50 for now. Dr. Lopez prefers treating more aggressively for ferritin >30 given his homozygous C282Y mutation, but will review that at next visit.  -Return in 3 months with Dr. Lopez to review plan of care (virtual ok)  -No evident cirrhosis/fibrosis on baseline eval in 2021, so no role for routine HCC screening  -Avoid alcohol  -Recommended he stop vit C as that can increase iron absorption. Okay to continue turmeric as that can be protective.  -Minimize cooking in cast iron skillets.    2.   Severe depression/anxiety  Discussed with Dr. Lopez and we do not believe his phlebotomies are causing the depression. The readily available serum nutrient is the iron, which we are attempting to deplete. Other nutrients/vitamins are largely stored in the liver and not readily accessible in serum; so the 500 ml phlebotomies every few months would only be removing a minute fraction of these over time.   Certainly reasonable to proceed with his work-up for nutritional deficiencies and treat accordingly, but we do not believe would be a result of the phlebotomy.     Plan:   -Continue mgmt with his PCP and  Psycn    ______________________________________________________________________________    History of Present Illness/ Interval History    Mr. Javid Amaya  is a 52 year old with hereditary hemochromatosis, treated with phlebotomy.   Returns for 1 yr follow-up.   At his visit last year, Dr. Haynes recommended labs every 3 mths with phlebotomy for parameter ferritin >50 and hgb>11.0. He has required a phlebotomy every 3 mths (ferritin ). His last phlebotomy was a month ago.   Tolerates these well.    He has a chronic history of intermittent depression and anxiety. He was doing really well with this in 2021. Since he started phlebotomies, he's had relapse in his depression which is quite severe and has been refractory. He is on antidepressant and working with Psych on this. He questions if the phlebotomy has disrupted any of his vitamins/minerals that may be contributing to his refractory depression.     ECOG Performance     0      Oncology History/Treatment  Diagnosis/Stage:   Hereditary hemochromatosis, homozygous C282Y mutation  -diagnosed at [facility]; later transferred his care to [facility] (Dr. Haynes)  -2021 abd MRI: iron deposition, no overt cirrhosis.  -2021 ECHO: normal    Treatment:  Initiated twice weekly phlebotomies initially, then transitioned to weekly phlebotomies until ferritin <50.   Now, requiring maintenance phlebotomies ~every  months with goal ferritin <50      Physical Exam    GENERAL: Alert and oriented to time place and person. Seated comfortably. In no distress. Alone.   HEAD: Atraumatic and normocephalic. No alopecia.  EYES: CHRYSTAL, EOMI. No erythema. No icterus.  LYMPH NODES: No palpable supraclavicular, cervical lymphadenopathy.  CHEST: clear to auscultation bilaterally. Resonant to percussion throughout bilaterally. Symmetrical breath movements bilaterally.  CVS: RRR  ABDOMEN: Soft. Not tender. Not distended. No palpable hepatomegaly or splenomegaly. No other mass palpable.  Bowel sounds present.  EXTREMITIES: Warm. No peripheral edema.  SKIN: no rash, or bruising or purpura.   NEURO: No gross deficit noted. Non-antalgic gait.      Lab Results    Recent Results (from the past 168 hour(s))   Ferritin   Result Value Ref Range    Ferritin 95 31 - 409 ng/mL   Hemoglobin and hematocrit   Result Value Ref Range    Hemoglobin 15.2 13.3 - 17.7 g/dL    Hematocrit 43.0 40.0 - 53.0 %       Imaging    No results found.    Billing  Total time 45 minutes, to include face to face visit, review of EMR, ordering, documentation and coordination of care on date of service    Signed by: Dawna Caballero NP

## 2023-05-23 NOTE — PROGRESS NOTES
"Oncology Rooming Note    May 23, 2023 2:13 PM   Javid Amaya is a 52 year old male who presents for:    Chief Complaint   Patient presents with     Hematology     Hereditary hemochromatosis - Labs provider and infusion     Initial Vitals: /78 (BP Location: Right arm, Patient Position: Sitting, Cuff Size: Adult Regular)   Pulse 79   Temp 98.4  F (36.9  C) (Tympanic)   Resp 12   Ht 1.727 m (5' 8\")   Wt 81.2 kg (179 lb)   SpO2 100%   BMI 27.22 kg/m   Estimated body mass index is 27.22 kg/m  as calculated from the following:    Height as of this encounter: 1.727 m (5' 8\").    Weight as of this encounter: 81.2 kg (179 lb). Body surface area is 1.97 meters squared.  No Pain (0) Comment: Data Unavailable   No LMP for male patient.  Allergies reviewed: Yes  Medications reviewed: Yes    Medications: Medication refills not needed today.  Pharmacy name entered into Psychiatric: [facility] [id number] [address] [facility]    Clinical concerns:  None      Anne Dean CMA            "

## 2023-05-30 ENCOUNTER — TELEPHONE (OUTPATIENT)
Dept: OTOLARYNGOLOGY | Facility: CLINIC | Age: 53
End: 2023-05-30
Payer: COMMERCIAL

## 2023-05-30 NOTE — TELEPHONE ENCOUNTER
Writer triaged call a bit. Dr. Carrasquillo thinks the patient could benefit from an antibiotic. She notices a very strong bacterial smell coming from the patient.     I sent message to Dr. Treadwell.     Marietta Jennings RN on 5/30/2023 at 12:53 PM

## 2023-05-30 NOTE — TELEPHONE ENCOUNTER
[facility] Call Center    Phone Message    May a detailed message be left on voicemail: yes     Reason for Call: Other: Dr Neida Patricia calling in regards to something she will like Dr Daily opinion on , will like for him to give her a call directly . Please reach out to Dr patricia at your soonest convenience .thank  You     Action Taken: Message routed to:  [facility] ENT     Travel Screening: Not Applicable

## 2023-06-07 ENCOUNTER — INFUSION THERAPY VISIT (OUTPATIENT)
Dept: INFUSION THERAPY | Facility: CLINIC | Age: 53
End: 2023-06-07
Attending: INTERNAL MEDICINE
Payer: COMMERCIAL

## 2023-06-07 ENCOUNTER — APPOINTMENT (OUTPATIENT)
Dept: LAB | Facility: CLINIC | Age: 53
End: 2023-06-07
Payer: COMMERCIAL

## 2023-06-07 VITALS — TEMPERATURE: 98.4 F | DIASTOLIC BLOOD PRESSURE: 74 MMHG | HEART RATE: 80 BPM | SYSTOLIC BLOOD PRESSURE: 114 MMHG

## 2023-06-07 DIAGNOSIS — E83.110 HEREDITARY HEMOCHROMATOSIS (H): Primary | ICD-10-CM

## 2023-06-07 LAB
FERRITIN SERPL-MCNC: 59 NG/ML (ref 31–409)
HCT VFR BLD AUTO: 42.8 % (ref 40–53)
HGB BLD-MCNC: 15 G/DL (ref 13.3–17.7)

## 2023-06-07 PROCEDURE — 99195 PHLEBOTOMY: CPT

## 2023-06-07 PROCEDURE — 85014 HEMATOCRIT: CPT | Performed by: NURSE PRACTITIONER

## 2023-06-07 PROCEDURE — 36415 COLL VENOUS BLD VENIPUNCTURE: CPT | Performed by: NURSE PRACTITIONER

## 2023-06-07 PROCEDURE — 82728 ASSAY OF FERRITIN: CPT | Performed by: NURSE PRACTITIONER

## 2023-06-07 NOTE — PROGRESS NOTES
Infusion Nursing Note:  Javidmarisel Amaya presents today for labs and phlebotomy  Patient seen by provider today: No   present during visit today: Not Applicable.    Note: Total volume of 500 ml removed from left antecubital without difficulty.  Pt tolerated well.  VS stable pre and post phlebotomy.  Pt denied feeling dizzy or lightheaded prior to discharge.       Intravenous Access:  Labs drawn without difficulty.  Peripheral IV placed.    Treatment Conditions:  Results reviewed, labs MET treatment parameters, ok to proceed with treatment.      Post Infusion Assessment:  Patient tolerated phlebotomy without incident.  Access discontinued per protocol.       Discharge Plan:   Patient discharged in stable condition accompanied by: self.  Departure Mode: Ambulatory.  Pt to return on 7/6/23 at 9:50 am for labs followed by clinic visit with Dr. Lopez and then possible phlebotomy.     Roberta Odonnell RN

## 2023-07-06 ENCOUNTER — ONCOLOGY VISIT (OUTPATIENT)
Dept: ONCOLOGY | Facility: CLINIC | Age: 53
End: 2023-07-06
Attending: INTERNAL MEDICINE
Payer: COMMERCIAL

## 2023-07-06 ENCOUNTER — APPOINTMENT (OUTPATIENT)
Dept: LAB | Facility: CLINIC | Age: 53
End: 2023-07-06
Attending: INTERNAL MEDICINE
Payer: COMMERCIAL

## 2023-07-06 ENCOUNTER — INFUSION THERAPY VISIT (OUTPATIENT)
Dept: INFUSION THERAPY | Facility: CLINIC | Age: 53
End: 2023-07-06
Attending: INTERNAL MEDICINE
Payer: COMMERCIAL

## 2023-07-06 VITALS
BODY MASS INDEX: 26.98 KG/M2 | DIASTOLIC BLOOD PRESSURE: 85 MMHG | RESPIRATION RATE: 16 BRPM | OXYGEN SATURATION: 97 % | WEIGHT: 178 LBS | HEART RATE: 70 BPM | SYSTOLIC BLOOD PRESSURE: 123 MMHG | TEMPERATURE: 98.3 F | HEIGHT: 68 IN

## 2023-07-06 DIAGNOSIS — E83.110 HEREDITARY HEMOCHROMATOSIS (H): Primary | ICD-10-CM

## 2023-07-06 DIAGNOSIS — E83.110 HEREDITARY HEMOCHROMATOSIS (H): ICD-10-CM

## 2023-07-06 LAB
FERRITIN SERPL-MCNC: 27 NG/ML (ref 31–409)
HGB BLD-MCNC: 15.4 G/DL (ref 13.3–17.7)

## 2023-07-06 PROCEDURE — 36415 COLL VENOUS BLD VENIPUNCTURE: CPT

## 2023-07-06 PROCEDURE — 99215 OFFICE O/P EST HI 40 MIN: CPT | Mod: VID | Performed by: INTERNAL MEDICINE

## 2023-07-06 PROCEDURE — G0463 HOSPITAL OUTPT CLINIC VISIT: HCPCS | Performed by: INTERNAL MEDICINE

## 2023-07-06 PROCEDURE — 82728 ASSAY OF FERRITIN: CPT | Performed by: INTERNAL MEDICINE

## 2023-07-06 PROCEDURE — 85018 HEMOGLOBIN: CPT | Performed by: INTERNAL MEDICINE

## 2023-07-06 ASSESSMENT — PAIN SCALES - GENERAL: PAINLEVEL: NO PAIN (0)

## 2023-07-06 NOTE — LETTER
7/6/2023         RE: Javid Amaya  [address]  [place] [address]        Dear Colleague,    Thank you for referring your patient, Javid Amaya, to the [facility]. Please see a copy of my visit note below.    Video-Visit Details    Type of service:  Video Visit    Video Start Time: 10:23 AM  Video End Time: 10:45 AM    Originating Location (pt. Location): Other [place] [facility] in [place]    Distant Location (provider location):  Home/[place]    Platform used for Video Visit: [facility] Hematology and Oncology Outpatient Progress Note    Patient: Javid Amaya  MRN: [id number]  Date of Service: Jul 6, 2023          Reason for Visit    1. Hereditary hemochromatosis    Primary Hematologist: formerly, Dr. Haynes (will establish with Dr. Lopez)      Assessment/Plan  1. Hereditary hemochromatosis (homozygous C282Y mutation)  Javid was diagnosed 2 yrs ago at [facility].  He then transferred care here and has been getting phlebotomies to keep his ferritin below 50.  He has been getting them once every 3 months.  Ferritin has not been in the goal range.  For the last 3 months has been getting monthly phlebotomies.    Today I discussed the complications associated with hemochromatosis with homozygous C282Y mutation and the need for phlebotomy and ferritin goals.  Patients with homozygous C282Y mutation are considered to be at the highest risk for iron overload and its complications.  Different societies have different ferritin goal in this situation but there is really good data that keeping it below 30 will be beneficial for him going forward and to minimize complications.  I reviewed the rationale behind this.  We also discussed about strategies to prevent stroke oral iron intake.  Reduce alcohol consumption, no cooking with cast iron skillet's, making sure that oral supplements do not have any iron in them, avoiding high-dose vitamin C, avoiding red  meat and avoiding raw seafood due to risk of infections.    His ferritin was 59 last month.  Today his ferritin came down to 27.  He has had 3 consecutive monthly phlebotomies.  I think the low ferritin today as a consequence of that.  I would still continue monthly labs with phlebotomy for ferritin more than 30.  My speculation is that he will probably need once every 2 months going forward from now on.      2.   Severe depression/anxiety  He is struggling with depression and anxiety and is on antidepressant medications.  There is no correlation between phlebotomy and depression.  The risk of nutrient deficiency other than iron deficiency from frequent phlebotomies is not well-established and I do not think it is contributing to any of his mental health issues.  Perhaps the diagnosis of hemochromatosis itself might be playing a role in contributing to his depression and anxiety.  His TSH was normal earlier this year and does not appear to have any endorgan damage right now.    ______________________________________________________________________________    History of Present Illness/ Interval History    Mr. Javid Amaya  is a 52 year old with hereditary hemochromatosis, treated with phlebotomy.     He is seen as a video visit to establish care with me.  In brief he was diagnosed with hereditary hemochromatosis with homozygous C282Y mutation back in early 2021.  His ferritin was more than 1400 at the time.  Has been on phlebotomy since then with a goal ferritin of less than 50.    He has been getting phlebotomy pretty much every 3 months now.  Most recent ferritin was 59.  Seem to have any endorgan damage.  No evidence of liver fibrosis or cirrhosis on ultrasound.  He does have elevated bilirubin at 1.4 but other LFTs are within normal limits.  Potentially could have Gilbert's syndrome.  He does have significant anxiety and depression.  A1c earlier this year was normal.  Normal TSH.    ECOG Performance      0      Oncology History/Treatment  Diagnosis/Stage:   Hereditary hemochromatosis, homozygous C282Y mutation  -diagnosed at [facility]; later transferred his care to [facility] (Dr. Haynes)  -2021 abd MRI: iron deposition, no overt cirrhosis.  -2021 ECHO: normal    Treatment:  Initiated twice weekly phlebotomies initially, then transitioned to weekly phlebotomies until ferritin <50.   Now, requiring maintenance phlebotomies ~every  months with goal ferritin <50      Physical Exam    GENERAL: Alert and oriented to time place and person. Seated comfortably.      Lab Results    Recent Results (from the past 168 hour(s))   Ferritin   Result Value Ref Range    Ferritin 27 (L) 31 - 409 ng/mL   Hemoglobin   Result Value Ref Range    Hemoglobin 15.4 13.3 - 17.7 g/dL       Imaging    No results found.    Billing  Total time 45 minutes, to include face to face visit, review of EMR, ordering, documentation and coordination of care on date of service    Signed by:  Rolly Lopez MD  Hematology and Medical Oncology  [facility]          Again, thank you for allowing me to participate in the care of your patient.        Sincerely,        Rolly Lopez MD

## 2023-07-06 NOTE — PROGRESS NOTES
Ferritin 27, does NOT meet parameters for phlebotomy. Pt was not seen in infusion.   Mir Mosley RN on 7/6/2023 at 11:09 AM

## 2023-07-06 NOTE — PROGRESS NOTES
Video-Visit Details    Type of service:  Video Visit    Video Start Time: 10:23 AM  Video End Time: 10:45 AM    Originating Location (pt. Location): Other [place] [facility] in [place]    Distant Location (provider location):  Home/[place]    Platform used for Video Visit: [facility] Hematology and Oncology Outpatient Progress Note    Patient: Javid Amaya  MRN: [id number]  Date of Service: Jul 6, 2023          Reason for Visit    1. Hereditary hemochromatosis    Primary Hematologist: formerly, Dr. Haynes (will establish with Dr. Lopez)      Assessment/Plan  1. Hereditary hemochromatosis (homozygous C282Y mutation)  Javid was diagnosed 2 yrs ago at [facility].  He then transferred care here and has been getting phlebotomies to keep his ferritin below 50.  He has been getting them once every 3 months.  Ferritin has not been in the goal range.  For the last 3 months has been getting monthly phlebotomies.    Today I discussed the complications associated with hemochromatosis with homozygous C282Y mutation and the need for phlebotomy and ferritin goals.  Patients with homozygous C282Y mutation are considered to be at the highest risk for iron overload and its complications.  Different societies have different ferritin goal in this situation but there is really good data that keeping it below 30 will be beneficial for him going forward and to minimize complications.  I reviewed the rationale behind this.  We also discussed about strategies to prevent stroke oral iron intake.  Reduce alcohol consumption, no cooking with cast iron skillet's, making sure that oral supplements do not have any iron in them, avoiding high-dose vitamin C, avoiding red meat and avoiding raw seafood due to risk of infections.    His ferritin was 59 last month.  Today his ferritin came down to 27.  He has had 3 consecutive monthly phlebotomies.  I think the low ferritin today as a consequence of that.  I would still  continue monthly labs with phlebotomy for ferritin more than 30.  My speculation is that he will probably need once every 2 months going forward from now on.      2.   Severe depression/anxiety  He is struggling with depression and anxiety and is on antidepressant medications.  There is no correlation between phlebotomy and depression.  The risk of nutrient deficiency other than iron deficiency from frequent phlebotomies is not well-established and I do not think it is contributing to any of his mental health issues.  Perhaps the diagnosis of hemochromatosis itself might be playing a role in contributing to his depression and anxiety.  His TSH was normal earlier this year and does not appear to have any endorgan damage right now.    ______________________________________________________________________________    History of Present Illness/ Interval History    Mr. Javid Amaya  is a 52 year old with hereditary hemochromatosis, treated with phlebotomy.     He is seen as a video visit to establish care with me.  In brief he was diagnosed with hereditary hemochromatosis with homozygous C282Y mutation back in early 2021.  His ferritin was more than 1400 at the time.  Has been on phlebotomy since then with a goal ferritin of less than 50.    He has been getting phlebotomy pretty much every 3 months now.  Most recent ferritin was 59.  Seem to have any endorgan damage.  No evidence of liver fibrosis or cirrhosis on ultrasound.  He does have elevated bilirubin at 1.4 but other LFTs are within normal limits.  Potentially could have Gilbert's syndrome.  He does have significant anxiety and depression.  A1c earlier this year was normal.  Normal TSH.    ECOG Performance     0      Oncology History/Treatment  Diagnosis/Stage:   Hereditary hemochromatosis, homozygous C282Y mutation  -diagnosed at [facility]; later transferred his care to [facility] [place] [facility] (Dr. Haynes)  -2021 abd MRI: iron deposition, no overt cirrhosis.  -2021  ECHO: normal    Treatment:  Initiated twice weekly phlebotomies initially, then transitioned to weekly phlebotomies until ferritin <50.   Now, requiring maintenance phlebotomies ~every  months with goal ferritin <50      Physical Exam    GENERAL: Alert and oriented to time place and person. Seated comfortably.      Lab Results    Recent Results (from the past 168 hour(s))   Ferritin   Result Value Ref Range    Ferritin 27 (L) 31 - 409 ng/mL   Hemoglobin   Result Value Ref Range    Hemoglobin 15.4 13.3 - 17.7 g/dL       Imaging    No results found.    Billing  Total time 45 minutes, to include face to face visit, review of EMR, ordering, documentation and coordination of care on date of service    Signed by:  Rolly Lopez MD  Hematology and Medical Oncology  [facility]

## 2023-08-09 ENCOUNTER — INFUSION THERAPY VISIT (OUTPATIENT)
Dept: INFUSION THERAPY | Facility: CLINIC | Age: 53
End: 2023-08-09
Attending: NURSE PRACTITIONER
Payer: COMMERCIAL

## 2023-08-09 ENCOUNTER — APPOINTMENT (OUTPATIENT)
Dept: LAB | Facility: CLINIC | Age: 53
End: 2023-08-09
Payer: COMMERCIAL

## 2023-08-09 VITALS
DIASTOLIC BLOOD PRESSURE: 79 MMHG | RESPIRATION RATE: 18 BRPM | TEMPERATURE: 98.6 F | SYSTOLIC BLOOD PRESSURE: 131 MMHG | HEART RATE: 74 BPM

## 2023-08-09 DIAGNOSIS — E83.110 HEREDITARY HEMOCHROMATOSIS (H): Primary | ICD-10-CM

## 2023-08-09 LAB
FERRITIN SERPL-MCNC: 38 NG/ML (ref 31–409)
HGB BLD-MCNC: 15.1 G/DL (ref 13.3–17.7)

## 2023-08-09 PROCEDURE — 85018 HEMOGLOBIN: CPT | Performed by: INTERNAL MEDICINE

## 2023-08-09 PROCEDURE — 99195 PHLEBOTOMY: CPT

## 2023-08-09 PROCEDURE — 82728 ASSAY OF FERRITIN: CPT | Performed by: INTERNAL MEDICINE

## 2023-08-09 PROCEDURE — 36415 COLL VENOUS BLD VENIPUNCTURE: CPT

## 2023-08-09 NOTE — PROGRESS NOTES
Infusion Nursing Note:  Javid Amaya presents today for Phlebotomy.    Patient seen by provider today: No   present during visit today: Not Applicable.    Note: N/A.      Intravenous Access:  Peripheral started with Phlebotomy kit.    Treatment Conditions:  Results reviewed, labs MET treatment parameters, ok to proceed with treatment.  Hgb 15.1 and Ferritin 38.    Post Infusion Assessment:  Patient tolerated Phlebotomy. 500ml whole blood collected.   Site patent and intact, free from redness, edema or discomfort.  No evidence of extravasations.  Access discontinued per protocol.       Discharge Plan:   Discharge instructions reviewed with: Patient.  Patient discharged in stable condition accompanied by: self.  Departure Mode: Ambulatory.      Addie Lara RN'

## 2023-09-06 ENCOUNTER — APPOINTMENT (OUTPATIENT)
Dept: LAB | Facility: CLINIC | Age: 53
End: 2023-09-06
Payer: COMMERCIAL

## 2023-09-06 ENCOUNTER — INFUSION THERAPY VISIT (OUTPATIENT)
Dept: INFUSION THERAPY | Facility: CLINIC | Age: 53
End: 2023-09-06
Attending: INTERNAL MEDICINE
Payer: COMMERCIAL

## 2023-09-06 DIAGNOSIS — E83.110 HEREDITARY HEMOCHROMATOSIS (H): ICD-10-CM

## 2023-09-06 LAB
FERRITIN SERPL-MCNC: 26 NG/ML (ref 31–409)
HGB BLD-MCNC: 14.4 G/DL (ref 13.3–17.7)

## 2023-09-06 PROCEDURE — 36415 COLL VENOUS BLD VENIPUNCTURE: CPT

## 2023-09-06 PROCEDURE — 85018 HEMOGLOBIN: CPT | Performed by: INTERNAL MEDICINE

## 2023-09-06 PROCEDURE — 82728 ASSAY OF FERRITIN: CPT | Performed by: INTERNAL MEDICINE

## 2023-09-06 NOTE — PROGRESS NOTES
Infusion Nursing Note:  Javid Amaya presents today for therapeutic phlebotomy, which was not required.    Patient seen by provider today: No   present during visit today: Not Applicable.    Note: N/A.      Intravenous Access:  No Intravenous access/labs at this visit.    Treatment Conditions:  Results reviewed, labs did NOT meet treatment parameters: pt's ferritin = 28.       Discharge Plan:   Pt not seen in the infusion clinic. Was given the lab results while in the waiting area.      Lizzette Causey RN

## 2023-10-04 ENCOUNTER — INFUSION THERAPY VISIT (OUTPATIENT)
Dept: INFUSION THERAPY | Facility: CLINIC | Age: 53
End: 2023-10-04
Attending: INTERNAL MEDICINE
Payer: COMMERCIAL

## 2023-10-04 ENCOUNTER — APPOINTMENT (OUTPATIENT)
Dept: LAB | Facility: CLINIC | Age: 53
End: 2023-10-04
Payer: COMMERCIAL

## 2023-10-04 DIAGNOSIS — E83.110 HEREDITARY HEMOCHROMATOSIS (H): Primary | ICD-10-CM

## 2023-10-04 LAB
FERRITIN SERPL-MCNC: 34 NG/ML (ref 31–409)
HCT VFR BLD AUTO: 43.5 % (ref 40–53)
HGB BLD-MCNC: 15.1 G/DL (ref 13.3–17.7)

## 2023-10-04 PROCEDURE — 85018 HEMOGLOBIN: CPT | Performed by: NURSE PRACTITIONER

## 2023-10-04 PROCEDURE — 82728 ASSAY OF FERRITIN: CPT | Performed by: NURSE PRACTITIONER

## 2023-10-04 PROCEDURE — 36415 COLL VENOUS BLD VENIPUNCTURE: CPT | Performed by: NURSE PRACTITIONER

## 2023-10-04 NOTE — PROGRESS NOTES
Patient declined phlebotomy today. He was just curious where his numbers are at. Patient will review lab results on MyChart.  Ferritin 34.  Patient not seen in infusion.   Next appt 11/8/2023  Damari Esparza RN

## 2023-11-08 ENCOUNTER — LAB (OUTPATIENT)
Dept: LAB | Facility: CLINIC | Age: 53
End: 2023-11-08
Payer: COMMERCIAL

## 2023-11-08 DIAGNOSIS — E83.110 HEREDITARY HEMOCHROMATOSIS (H): Primary | ICD-10-CM

## 2023-11-08 LAB
FERRITIN SERPL-MCNC: 54 NG/ML (ref 31–409)
HCT VFR BLD AUTO: 45.7 % (ref 40–53)
HGB BLD-MCNC: 15.8 G/DL (ref 13.3–17.7)

## 2023-11-08 PROCEDURE — 85014 HEMATOCRIT: CPT | Performed by: NURSE PRACTITIONER

## 2023-11-08 PROCEDURE — 36415 COLL VENOUS BLD VENIPUNCTURE: CPT | Performed by: NURSE PRACTITIONER

## 2023-11-08 PROCEDURE — 82728 ASSAY OF FERRITIN: CPT | Performed by: NURSE PRACTITIONER

## 2023-12-27 ENCOUNTER — TRANSFERRED RECORDS (OUTPATIENT)
Dept: HEALTH INFORMATION MANAGEMENT | Facility: CLINIC | Age: 53
End: 2023-12-27
Payer: COMMERCIAL

## 2024-01-01 NOTE — GROUP NOTE
O2 Device/Concentration: Flow (L/min) (Oxygen Therapy): 6, Oxygen Concentration (%): 21,  , Flow (L/min) (Oxygen Therapy): 6    Plan of Care:Genia remains on high flow nasal cannula 21-25% on documented settings. POC as ordered.    - Continue HFNC 6L no weaning of flow  - Flow adjusted to 5L for PO only then back to 6L.  - Daily VBG   Psychoeducation Group Note    PATIENT'S NAME: Javid Amaya  MRN:   [id number]  :   1970  ACCT. NUMBER: [id number]  DATE OF SERVICE: 3/10/23  START TIME: 10:00 AM  END TIME: 10:50 AM  FACILITATOR: Irina Fields RN  TOPIC:  Wellness Group: Brain [facility] Partial Hospitalization [facility]  TRACK: 2    NUMBER OF PARTICIPANTS: 6    Summary of Group / Topics Discussed:  Brain [facility]:  Pathophysiology of Mood Disorders: Patients were educated on mood disorder etiology and neuroscience, risk factors, symptoms, and pharmacologic, psychotherapeutic, and complementary treatment options. Patients were guided on a discussion of mental, behavioral, and physical symptoms and shared their symptoms with the group.     Patient Session Goals / Objectives:  ? Described what mood disorders are and identified risk factors   ? Explained how chemical imbalances in the brain can cause symptoms and how medications work to reverse this imbalance   ? Identified and described pharmacologic, psychotherapeutic, and complementary treatment options      Patient Participation / Response:  Fully participated with the group by sharing personal reflections / insights and openly received / provided feedback with other participants.    Demonstrated understanding of topics discussed through group discussion and participation and Identified / Expressed personal readiness to practice skills    Treatment Plan:  Patient has See Epic Treatment Plan - Patient is discharging.    Irina Fields RN

## 2024-01-17 ENCOUNTER — TRANSFERRED RECORDS (OUTPATIENT)
Dept: HEALTH INFORMATION MANAGEMENT | Facility: CLINIC | Age: 54
End: 2024-01-17
Payer: COMMERCIAL

## 2024-02-15 ENCOUNTER — PATIENT OUTREACH (OUTPATIENT)
Dept: CARE COORDINATION | Facility: CLINIC | Age: 54
End: 2024-02-15
Payer: COMMERCIAL

## 2024-04-11 ENCOUNTER — TRANSFERRED RECORDS (OUTPATIENT)
Dept: HEALTH INFORMATION MANAGEMENT | Facility: CLINIC | Age: 54
End: 2024-04-11
Payer: COMMERCIAL

## 2024-04-25 ENCOUNTER — TRANSFERRED RECORDS (OUTPATIENT)
Dept: HEALTH INFORMATION MANAGEMENT | Facility: CLINIC | Age: 54
End: 2024-04-25
Payer: COMMERCIAL

## 2024-05-26 ENCOUNTER — HEALTH MAINTENANCE LETTER (OUTPATIENT)
Age: 54
End: 2024-05-26

## 2024-06-03 NOTE — LETTER
5/23/2023         RE: Javid Amaya  [address] [place] [address]        Dear Colleague,    Thank you for referring your patient, Javid Amaya, to the [facility] [place]. Please see a copy of my visit note below.    [facility] Hematology and Oncology Outpatient Progress Note    Patient: Javid Amaya  MRN: [id number]  Date of Service: May 23, 2023          Reason for Visit    1. Hereditary hemochromatosis    Primary Hematologist: formerly, Dr. Haynes (will establish with Dr. Lopez)      Assessment/Plan  1. Hereditary hemochromatosis (homozygous C282Y mutation)  Javid was diagnosed 2 yrs ago at [facility]. He then transferred his care here with Dr. Haynes last year.   He has been on maintenance phlebotomies every 3 mths for goal ferritin <50; his ferritin has been  on average.   Tolerates phlebotomies well.     I reviewed plan of care with Dr. Lopez today, who will establish as primary at an upcoming visit.     Plan:  -Every 3 mth hgb and ferritin with phlebotomy parameters: ferritin >50 for now. Dr. Lopez prefers treating more aggressively for ferritin >30 given his homozygous C282Y mutation, but will review that at next visit.  -Return in 3 months with Dr. Lopez to review plan of care (virtual ok)  -No evident cirrhosis/fibrosis on baseline eval in 2021, so no role for routine HCC screening  -Avoid alcohol  -Recommended he stop vit C as that can increase iron absorption. Okay to continue turmeric as that can be protective.  -Minimize cooking in cast iron skillets.    2.   Severe depression/anxiety  Discussed with Dr. Lopez and we do not believe his phlebotomies are causing the depression. The readily available serum nutrient is the iron, which we are attempting to deplete. Other nutrients/vitamins are largely stored in the liver and not readily accessible in serum; so the 500 ml phlebotomies every few months would only be removing a minute fraction of  MTM referral from: Transitions of Care (recent hospital discharge or ED visit)    MTM referral outreach attempt #2 on Serina 3, 2024 at 9:11 AM      Outcome: Spoke with patient she declined services    Use vbc (saba) for the carrier/Plan on the flowsheet          Lety Simmons CPhT  MTM      these over time.   Certainly reasonable to proceed with his work-up for nutritional deficiencies and treat accordingly, but we do not believe would be a result of the phlebotomy.     Plan:   -Continue mgmt with his PCP and Psycn    ______________________________________________________________________________    History of Present Illness/ Interval History    Mr. Javid Amaya  is a 52 year old with hereditary hemochromatosis, treated with phlebotomy.   Returns for 1 yr follow-up.   At his visit last year, Dr. Haynes recommended labs every 3 mths with phlebotomy for parameter ferritin >50 and hgb>11.0. He has required a phlebotomy every 3 mths (ferritin ). His last phlebotomy was a month ago.   Tolerates these well.    He has a chronic history of intermittent depression and anxiety. He was doing really well with this in 2021. Since he started phlebotomies, he's had relapse in his depression which is quite severe and has been refractory. He is on antidepressant and working with Psych on this. He questions if the phlebotomy has disrupted any of his vitamins/minerals that may be contributing to his refractory depression.     ECOG Performance     0      Oncology History/Treatment  Diagnosis/Stage:   Hereditary hemochromatosis, homozygous C282Y mutation  -diagnosed at [facility]; later transferred his care to [facility] (Dr. Haynes)  -2021 abd MRI: iron deposition, no overt cirrhosis.  -2021 ECHO: normal    Treatment:  Initiated twice weekly phlebotomies initially, then transitioned to weekly phlebotomies until ferritin <50.   Now, requiring maintenance phlebotomies ~every  months with goal ferritin <50      Physical Exam    GENERAL: Alert and oriented to time place and person. Seated comfortably. In no distress. Alone.   HEAD: Atraumatic and normocephalic. No alopecia.  EYES: CHRYSTAL, EOMI. No erythema. No icterus.  LYMPH NODES: No palpable supraclavicular, cervical lymphadenopathy.  CHEST: clear to  "auscultation bilaterally. Resonant to percussion throughout bilaterally. Symmetrical breath movements bilaterally.  CVS: RRR  ABDOMEN: Soft. Not tender. Not distended. No palpable hepatomegaly or splenomegaly. No other mass palpable. Bowel sounds present.  EXTREMITIES: Warm. No peripheral edema.  SKIN: no rash, or bruising or purpura.   NEURO: No gross deficit noted. Non-antalgic gait.      Lab Results    Recent Results (from the past 168 hour(s))   Ferritin   Result Value Ref Range    Ferritin 95 31 - 409 ng/mL   Hemoglobin and hematocrit   Result Value Ref Range    Hemoglobin 15.2 13.3 - 17.7 g/dL    Hematocrit 43.0 40.0 - 53.0 %       Imaging    No results found.    Billing  Total time 45 minutes, to include face to face visit, review of EMR, ordering, documentation and coordination of care on date of service    Signed by: Dawna Caballero NP      Oncology Rooming Note    May 23, 2023 2:13 PM   Javid Amaya is a 52 year old male who presents for:    Chief Complaint   Patient presents with     Hematology     Hereditary hemochromatosis - Labs provider and infusion     Initial Vitals: /78 (BP Location: Right arm, Patient Position: Sitting, Cuff Size: Adult Regular)   Pulse 79   Temp 98.4  F (36.9  C) (Tympanic)   Resp 12   Ht 1.727 m (5' 8\")   Wt 81.2 kg (179 lb)   SpO2 100%   BMI 27.22 kg/m   Estimated body mass index is 27.22 kg/m  as calculated from the following:    Height as of this encounter: 1.727 m (5' 8\").    Weight as of this encounter: 81.2 kg (179 lb). Body surface area is 1.97 meters squared.  No Pain (0) Comment: Data Unavailable   No LMP for male patient.  Allergies reviewed: Yes  Medications reviewed: Yes    Medications: Medication refills not needed today.  Pharmacy name entered into [facility]: [facility] [id number] [facility]    Clinical concerns:  None      Anne Dean CMA                Again, thank you for allowing me to participate in the care " of your patient.        Sincerely,        Dawna Caballero, NP

## 2024-07-02 ASSESSMENT — PATIENT HEALTH QUESTIONNAIRE - PHQ9
SUM OF ALL RESPONSES TO PHQ QUESTIONS 1-9: 0
10. IF YOU CHECKED OFF ANY PROBLEMS, HOW DIFFICULT HAVE THESE PROBLEMS MADE IT FOR YOU TO DO YOUR WORK, TAKE CARE OF THINGS AT HOME, OR GET ALONG WITH OTHER PEOPLE: NOT DIFFICULT AT ALL
SUM OF ALL RESPONSES TO PHQ QUESTIONS 1-9: 0

## 2024-07-03 ENCOUNTER — OFFICE VISIT (OUTPATIENT)
Dept: FAMILY MEDICINE | Facility: CLINIC | Age: 54
End: 2024-07-03
Payer: COMMERCIAL

## 2024-07-03 VITALS
HEIGHT: 68 IN | DIASTOLIC BLOOD PRESSURE: 81 MMHG | WEIGHT: 177.1 LBS | RESPIRATION RATE: 18 BRPM | OXYGEN SATURATION: 96 % | BODY MASS INDEX: 26.84 KG/M2 | TEMPERATURE: 98 F | HEART RATE: 75 BPM | SYSTOLIC BLOOD PRESSURE: 119 MMHG

## 2024-07-03 DIAGNOSIS — Z01.818 PREOP GENERAL PHYSICAL EXAM: Primary | ICD-10-CM

## 2024-07-03 DIAGNOSIS — Z12.11 SCREEN FOR COLON CANCER: ICD-10-CM

## 2024-07-03 DIAGNOSIS — M16.12 ARTHRITIS OF LEFT HIP: ICD-10-CM

## 2024-07-03 DIAGNOSIS — E83.110 HEREDITARY HEMOCHROMATOSIS (H): ICD-10-CM

## 2024-07-03 LAB
ERYTHROCYTE [DISTWIDTH] IN BLOOD BY AUTOMATED COUNT: 12.7 % (ref 10–15)
HCT VFR BLD AUTO: 45 % (ref 40–53)
HGB BLD-MCNC: 15.5 G/DL (ref 13.3–17.7)
MCH RBC QN AUTO: 31.1 PG (ref 26.5–33)
MCHC RBC AUTO-ENTMCNC: 34.4 G/DL (ref 31.5–36.5)
MCV RBC AUTO: 90 FL (ref 78–100)
PLATELET # BLD AUTO: 205 10E3/UL (ref 150–450)
RBC # BLD AUTO: 4.99 10E6/UL (ref 4.4–5.9)
WBC # BLD AUTO: 7.1 10E3/UL (ref 4–11)

## 2024-07-03 PROCEDURE — 36415 COLL VENOUS BLD VENIPUNCTURE: CPT | Performed by: FAMILY MEDICINE

## 2024-07-03 PROCEDURE — 85027 COMPLETE CBC AUTOMATED: CPT | Performed by: FAMILY MEDICINE

## 2024-07-03 PROCEDURE — 80048 BASIC METABOLIC PNL TOTAL CA: CPT | Performed by: FAMILY MEDICINE

## 2024-07-03 PROCEDURE — 99214 OFFICE O/P EST MOD 30 MIN: CPT | Performed by: FAMILY MEDICINE

## 2024-07-03 PROCEDURE — 82728 ASSAY OF FERRITIN: CPT | Performed by: FAMILY MEDICINE

## 2024-07-03 RX ORDER — TESTOSTERONE CYPIONATE 200 MG/ML
200 INJECTION, SOLUTION INTRAMUSCULAR
COMMUNITY
Start: 2024-04-04

## 2024-07-03 RX ORDER — NEEDLES, DISPOSABLE 25GX5/8"
NEEDLE, DISPOSABLE MISCELLANEOUS
COMMUNITY
Start: 2024-07-02

## 2024-07-03 RX ORDER — MULTIPLE VITAMINS W/ MINERALS TAB 9MG-400MCG
1 TAB ORAL DAILY
COMMUNITY
Start: 2023-05-01

## 2024-07-03 RX ORDER — NALTREXONE HCL 4.5 MG
1 CAPSULE ORAL DAILY
COMMUNITY
Start: 2023-05-01 | End: 2024-10-04

## 2024-07-03 RX ORDER — SYRINGE, DISPOSABLE, 1 ML
SYRINGE, EMPTY DISPOSABLE MISCELLANEOUS
COMMUNITY
Start: 2024-07-02

## 2024-07-03 ASSESSMENT — ENCOUNTER SYMPTOMS
DYSURIA: 0
PALPITATIONS: 0
COLOR CHANGE: 0
ARTHRALGIAS: 0
SEIZURES: 0
BACK PAIN: 0
SORE THROAT: 0
HEMATURIA: 0
VOMITING: 0
SHORTNESS OF BREATH: 0
FEVER: 0
ABDOMINAL PAIN: 0
EYE PAIN: 0
COUGH: 0
CHILLS: 0

## 2024-07-03 NOTE — PROGRESS NOTES
Preoperative Evaluation  [facility]  [address]  [facility] [address]  Phone: [phone]  Fax: [phone]  Primary Provider: Sanjay Mahan MD  Pre-op Performing Provider: Sanjay Mahan MD  Jul 3, 2024             7/2/2024   Surgical Information   What procedure is being done? Hip replacement   Facility or Hospital where procedure/surgery will be performed: [facility]   Who is doing the procedure / surgery? Dr sanchez   Date of surgery / procedure: July 29th   Time of surgery / procedure: Dont know yet   Where do you plan to recover after surgery? at home with family        Fax number for surgical facility: [phone]    Assessment & Plan     The proposed surgical procedure is considered INTERMEDIATE risk.    Problem List Items Addressed This Visit       Arthritis of left hip     This patient's hip pain is relatively new but has been severe and recalcitrant to nonoperative treatments.  He is able to perform greater than 4 metabolic equivalents.  No history of underlying heart disease.  No history of stroke, diabetes, chronic kidney disease.  He has previously done well with surgeries.  I have asked him to hold naltrexone for 3 days prior to his surgery.  He should otherwise take his normal medicines.  Will check a CBC and basic metabolic panel as requested by the surgical center.  Assuming these come back within normal limits, he is cleared for surgery without recommendations for additional evaluation or diagnostic testing.         Relevant Medications    Naltrexone HCl, Pain, (NALTREX) 4.5 MG CAPS    Other Relevant Orders    Basic metabolic panel  (Ca, Cl, CO2, Creat, Gluc, K, Na, BUN) (Completed)    CBC with platelets (Completed)    Ferritin (Completed)    Hereditary hemochromatosis (H24)    Relevant Orders    CBC with platelets (Completed)    Ferritin (Completed)     Other Visit Diagnoses       Preop general physical exam    -  Primary    Relevant  "Orders    Basic metabolic panel  (Ca, Cl, CO2, Creat, Gluc, K, Na, BUN) (Completed)    CBC with platelets (Completed)    Ferritin (Completed)    Screen for colon cancer        Relevant Orders    Colonoscopy Screening  Referral                    - No identified additional risk factors other than previously addressed    Preoperative Medication Instructions    .  Current Outpatient Medications:  - TAKE ALL MEDICINES AS YOU NORMALLY WOULD except for naltrexone.  You will need to hold this medication for 3 days prior to your surgery.  You can resume once you are no longer on narcotic pain medications after surgery (or told by your team that you can restart).   - Hold supplements until after surgery.     B-D SYRINGE LUER-AGNIESZKA 1 ML MISC, , Disp: , Rfl:     BD DISP NEEDLES 25G X 5/8\" MISC, , Disp: , Rfl:     BD HYPODERMIC NEEDLE 18G X 1\" MISC, , Disp: , Rfl:     buPROPion (WELLBUTRIN XL) 150 MG 24 hr tablet, Take 150 mg by mouth every morning, Disp: , Rfl:     desvenlafaxine (PRISTIQ) 50 MG 24 hr tablet, Take 1 tablet (50 mg) by mouth daily, Disp: 30 tablet, Rfl: 0    Methylcobalamin 1 MG CHEW, Take 1 chew tab by mouth daily, Disp: , Rfl:     multivitamin w/minerals (MULTI-VITAMIN) tablet, Take 1 tablet by mouth daily, Disp: , Rfl:     Naltrexone HCl, Pain, (NALTREX) 4.5 MG CAPS, Take 1 capsule by mouth daily, Disp: , Rfl:     testosterone cypionate (DEPOTESTOSTERONE) 200 MG/ML injection, Inject 200 mg into the muscle twice a week, Disp: , Rfl:     vitamin C with B complex (B COMPLEX-C) tablet, Take 1 tablet by mouth daily, Disp: , Rfl:     Vitamin D (Cholecalciferol) 25 MCG (1000 UT) TABS, , Disp: , Rfl:       Recommendation  Approval given to proceed with proposed procedure, without further diagnostic evaluation.    Samuel Hua is a 53 year old, presenting for the following:  Pre-Op Exam (Left hip replacement 07/29)          7/3/2024    10:41 AM   Additional Questions   Roomed by Ms   Accompanied by Self " "        7/3/2024   Forms   Any forms needing to be completed Yes          7/3/2024    10:41 AM   Patient Reported Additional Medications   Patient reports taking the following new medications NA     HPI related to upcoming procedure: left hip pain starting late last year.  MRI showed advanced DJD.  This has limited his mobility.  He is able to \"get around\" but he struggles if he is up and walking.  Non-operative    - his hematologist said that this can be common with individuals with hemachromatosis.     - mood has been okay.         7/2/2024   Pre-Op Questionnaire   Have you ever had a heart attack or stroke? No   Have you ever had surgery on your heart or blood vessels, such as a stent placement, a coronary artery bypass, or surgery on an artery in your head, neck, heart, or legs? No   Do you have chest pain with activity? No   Do you have a history of heart failure? No   Do you currently have a cold, bronchitis or symptoms of other infection? No   Do you have a cough, shortness of breath, or wheezing? No   Do you or anyone in your family have previous history of blood clots? No   Do you or does anyone in your family have a serious bleeding problem such as prolonged bleeding following surgeries or cuts? No   Have you ever had problems with anemia or been told to take iron pills? No   Have you had any abnormal blood loss such as black, tarry or bloody stools? No   Have you ever had a blood transfusion? No   Are you willing to have a blood transfusion if it is medically needed before, during, or after your surgery? Yes   Have you or any of your relatives ever had problems with anesthesia? No   Do you have sleep apnea, excessive snoring or daytime drowsiness? No   Do you have any artifical heart valves or other implanted medical devices like a pacemaker, defibrillator, or continuous glucose monitor? No   Do you have artificial joints? No   Are you allergic to latex? No     Patient Active Problem List    Diagnosis " "Date Noted    Arthritis of left hip 07/03/2024     Priority: Medium    Fatigue, unspecified type 05/16/2023     Priority: Medium    Hypercholesteremia 03/16/2023     Priority: Medium    Elevated fasting glucose 03/16/2023     Priority: Medium    Chronic intractable headache, unspecified headache type 03/16/2023     Priority: Medium    Hereditary hemochromatosis (H24) 07/20/2022     Priority: Medium    Severe episode of recurrent major depressive disorder, without psychotic features (H) 07/07/2017     Priority: Medium      Past Medical History:   Diagnosis Date    Depressive disorder 2004, 2017    Generalized anxiety disorder 07/07/2017    Insomnia 07/07/2017    Severe episode of recurrent major depressive disorder, without psychotic features (H) 07/07/2017     Past Surgical History:   Procedure Laterality Date    HERNIA REPAIR  as a child    ORTHOPEDIC SURGERY       Current Outpatient Medications   Medication Sig Dispense Refill    B-D SYRINGE LUER-AGNIESZKA 1 ML MISC       BD DISP NEEDLES 25G X 5/8\" MISC       BD HYPODERMIC NEEDLE 18G X 1\" MISC       buPROPion (WELLBUTRIN XL) 150 MG 24 hr tablet Take 150 mg by mouth every morning      desvenlafaxine (PRISTIQ) 50 MG 24 hr tablet Take 1 tablet (50 mg) by mouth daily 30 tablet 0    Methylcobalamin 1 MG CHEW Take 1 chew tab by mouth daily      multivitamin w/minerals (MULTI-VITAMIN) tablet Take 1 tablet by mouth daily      Naltrexone HCl, Pain, (NALTREX) 4.5 MG CAPS Take 1 capsule by mouth daily      testosterone cypionate (DEPOTESTOSTERONE) 200 MG/ML injection Inject 200 mg into the muscle twice a week      vitamin C with B complex (B COMPLEX-C) tablet Take 1 tablet by mouth daily      Vitamin D (Cholecalciferol) 25 MCG (1000 UT) TABS          No Known Allergies     Social History     Tobacco Use    Smoking status: Former     Current packs/day: 0.00     Average packs/day: 0.5 packs/day for 5.0 years (2.5 ttl pk-yrs)     Types: Cigarettes     Start date: 5/20/1986     Quit " "date: 1991     Years since quittin.1     Passive exposure: Past    Smokeless tobacco: Never   Substance Use Topics    Alcohol use: Not Currently     History   Drug Use Unknown           Review of Systems   Constitutional:  Negative for chills and fever.   HENT:  Negative for ear pain and sore throat.    Eyes:  Negative for pain and visual disturbance.   Respiratory:  Negative for cough and shortness of breath.    Cardiovascular:  Negative for chest pain and palpitations.   Gastrointestinal:  Negative for abdominal pain and vomiting.   Genitourinary:  Negative for dysuria and hematuria.   Musculoskeletal:  Negative for arthralgias and back pain.   Skin:  Negative for color change and rash.   Neurological:  Negative for seizures and syncope.   All other systems reviewed and are negative.    Objective    /81 (BP Location: Left arm, Patient Position: Sitting, Cuff Size: Adult Regular)   Pulse 75   Temp 98  F (36.7  C) (Oral)   Resp 18   Ht 1.727 m (5' 8\")   Wt 80.3 kg (177 lb 1.6 oz)   SpO2 96%   BMI 26.93 kg/m     Estimated body mass index is 26.93 kg/m  as calculated from the following:    Height as of this encounter: 1.727 m (5' 8\").    Weight as of this encounter: 80.3 kg (177 lb 1.6 oz).  Physical Exam  Nursing note reviewed.   Constitutional:       General: He is not in acute distress.     Appearance: Normal appearance. He is not ill-appearing.   HENT:      Head: Normocephalic and atraumatic.   Eyes:      Extraocular Movements: Extraocular movements intact.      Conjunctiva/sclera: Conjunctivae normal.   Pulmonary:      Effort: Pulmonary effort is normal.   Neurological:      Mental Status: He is alert and oriented to person, place, and time.   Psychiatric:         Attention and Perception: Attention normal.         Mood and Affect: Mood normal.         Speech: Speech normal.         Thought Content: Thought content normal.           Recent Labs   Lab Test 23  0740 10/04/23  0740   HGB " 15.8 15.1        Diagnostics  Recent Results (from the past 48 hour(s))   Basic metabolic panel  (Ca, Cl, CO2, Creat, Gluc, K, Na, BUN)    Collection Time: 07/03/24 11:22 AM   Result Value Ref Range    Sodium 136 135 - 145 mmol/L    Potassium 4.9 3.4 - 5.3 mmol/L    Chloride 100 98 - 107 mmol/L    Carbon Dioxide (CO2) 28 22 - 29 mmol/L    Anion Gap 8 7 - 15 mmol/L    Urea Nitrogen 15.5 6.0 - 20.0 mg/dL    Creatinine 0.97 0.67 - 1.17 mg/dL    GFR Estimate >90 >60 mL/min/1.73m2    Calcium 9.3 8.6 - 10.0 mg/dL    Glucose 89 70 - 99 mg/dL   CBC with platelets    Collection Time: 07/03/24 11:22 AM   Result Value Ref Range    WBC Count 7.1 4.0 - 11.0 10e3/uL    RBC Count 4.99 4.40 - 5.90 10e6/uL    Hemoglobin 15.5 13.3 - 17.7 g/dL    Hematocrit 45.0 40.0 - 53.0 %    MCV 90 78 - 100 fL    MCH 31.1 26.5 - 33.0 pg    MCHC 34.4 31.5 - 36.5 g/dL    RDW 12.7 10.0 - 15.0 %    Platelet Count 205 150 - 450 10e3/uL   Ferritin    Collection Time: 07/03/24 11:22 AM   Result Value Ref Range    Ferritin 58 31 - 409 ng/mL      No EKG required, no history of coronary heart disease, significant arrhythmia, peripheral arterial disease or other structural heart disease.    Revised Cardiac Risk Index (RCRI)  The patient has the following serious cardiovascular risks for perioperative complications:   - No serious cardiac risks = 0 points     RCRI Interpretation: 0 points: Class I (very low risk - 0.4% complication rate)         Signed Electronically by: Sanjay Mahan MD  Copy of this evaluation report is provided to requesting physician.         Answers submitted by the patient for this visit:  Patient Health Questionnaire (Submitted on 7/2/2024)  If you checked off any problems, how difficult have these problems made it for you to do your work, take care of things at home, or get along with other people?: Not difficult at all  PHQ9 TOTAL SCORE: 0

## 2024-07-03 NOTE — PATIENT INSTRUCTIONS
"How to Take Your Medication Before Surgery  Preoperative Medication Instructions     .  Current Outpatient Medications:  - TAKE ALL MEDICINES AS YOU NORMALLY WOULD except for naltrexone.  You will need to hold this medication for 3 days prior to your surgery.  You can resume once you are no longer on narcotic pain medications after surgery (or told by your team that you can restart).   - Hold supplements until after surgery.     B-D SYRINGE LUER-AGNIESZKA 1 ML MISC, , Disp: , Rfl:     BD DISP NEEDLES 25G X 5/8\" MISC, , Disp: , Rfl:     BD HYPODERMIC NEEDLE 18G X 1\" MISC, , Disp: , Rfl:     buPROPion (WELLBUTRIN XL) 150 MG 24 hr tablet, Take 150 mg by mouth every morning, Disp: , Rfl:     desvenlafaxine (PRISTIQ) 50 MG 24 hr tablet, Take 1 tablet (50 mg) by mouth daily, Disp: 30 tablet, Rfl: 0    Methylcobalamin 1 MG CHEW, Take 1 chew tab by mouth daily, Disp: , Rfl:     multivitamin w/minerals (MULTI-VITAMIN) tablet, Take 1 tablet by mouth daily, Disp: , Rfl:     Naltrexone HCl, Pain, (NALTREX) 4.5 MG CAPS, Take 1 capsule by mouth daily, Disp: , Rfl:     testosterone cypionate (DEPOTESTOSTERONE) 200 MG/ML injection, Inject 200 mg into the muscle twice a week, Disp: , Rfl:     vitamin C with B complex (B COMPLEX-C) tablet, Take 1 tablet by mouth daily, Disp: , Rfl:     Vitamin D (Cholecalciferol) 25 MCG (1000 UT) TABS, , Disp: , Rfl:            Patient Education   Preparing for Your Surgery  Getting started  A nurse will call you to review your health history and instructions. They will give you an arrival time based on your scheduled surgery time. Please be ready to share:  Your doctor's clinic name and phone number  Your medical, surgical, and anesthesia history  A list of allergies and sensitivities  A list of medicines, including herbal treatments and over-the-counter drugs  Whether the patient has a legal guardian (ask how to send us the papers in advance)  Please tell us if you're pregnant--or if there's any chance " you might be pregnant. Some surgeries may injure a fetus (unborn baby), so they require a pregnancy test. Surgeries that are safe for a fetus don't always need a test, and you can choose whether to have one.   If you have a child who's having surgery, please ask for a copy of Preparing for Your Child's Surgery.    Preparing for surgery  Within 10 to 30 days of surgery: Have a pre-op exam (sometimes called an H&P, or History and Physical). This can be done at a clinic or pre-operative center.  If you're having a , you may not need this exam. Talk to your care team.  At your pre-op exam, talk to your care team about all medicines you take. If you need to stop any medicines before surgery, ask when to start taking them again.  We do this for your safety. Many medicines can make you bleed too much during surgery. Some change how well surgery (anesthesia) drugs work.  Call your insurance company to let them know you're having surgery. (If you don't have insurance, call [phone].)  Call your clinic if there's any change in your health. This includes signs of a cold or flu (sore throat, runny nose, cough, rash, fever). It also includes a scrape or scratch near the surgery site.  If you have questions on the day of surgery, call your hospital or surgery center.  Eating and drinking guidelines  For your safety: Unless your surgeon tells you otherwise, follow the guidelines below.  Eat and drink as usual until 8 hours before you arrive for surgery. After that, no food or milk.  Drink clear liquids until 2 hours before you arrive. These are liquids you can see through, like water, Gatorade, and Propel Water. They also include plain black coffee and tea (no cream or milk), candy, and breath mints. You can spit out gum when you arrive.  If you drink alcohol: Stop drinking it the night before surgery.  If your care team tells you to take medicine on the morning of surgery, it's okay to take it with a sip of  water.  Preventing infection  Shower or bathe the night before and morning of your surgery. Follow the instructions your clinic gave you. (If no instructions, use regular soap.)  Don't shave or clip hair near your surgery site. We'll remove the hair if needed.  Don't smoke or vape the morning of surgery. You may chew nicotine gum up to 2 hours before surgery. A nicotine patch is okay.  Note: Some surgeries require you to completely quit smoking and nicotine. Check with your surgeon.  Your care team will make every effort to keep you safe from infection. We will:  Clean our hands often with soap and water (or an alcohol-based hand rub).  Clean the skin at your surgery site with a special soap that kills germs.  Give you a special gown to keep you warm. (Cold raises the risk of infection.)  Wear special hair covers, masks, gowns and gloves during surgery.  Give antibiotic medicine, if prescribed. Not all surgeries need antibiotics.  What to bring on the day of surgery  Photo ID and insurance card  Copy of your health care directive, if you have one  Glasses and hearing aids (bring cases)  You can't wear contacts during surgery  Inhaler and eye drops, if you use them (tell us about these when you arrive)  CPAP machine or breathing device, if you use them  A few personal items, if spending the night  If you have . . .  A pacemaker, ICD (cardiac defibrillator) or other implant: Bring the ID card.  An implanted stimulator: Bring the remote control.  A legal guardian: Bring a copy of the certified (court-stamped) guardianship papers.  Please remove any jewelry, including body piercings. Leave jewelry and other valuables at home.  If you're going home the day of surgery  You must have a responsible adult drive you home. They should stay with you overnight as well.  If you don't have someone to stay with you, and you aren't safe to go home alone, we may keep you overnight. Insurance often won't pay for this.  After  surgery  If it's hard to control your pain or you need more pain medicine, please call your surgeon's office.  Questions?   If you have any questions for your care team, list them here: _________________________________________________________________________________________________________________________________________________________________________ ____________________________________ ____________________________________ ____________________________________  For informational purposes only. Not to replace the advice of your health care provider. Copyright   2003, 2019 [facility]. All rights reserved. Clinically reviewed by Cata Anne MD. [employer]ART[employer] 593460 - REV 12/22.

## 2024-07-03 NOTE — ASSESSMENT & PLAN NOTE
This patient's hip pain is relatively new but has been severe and recalcitrant to nonoperative treatments.  He is able to perform greater than 4 metabolic equivalents.  No history of underlying heart disease.  No history of stroke, diabetes, chronic kidney disease.  He has previously done well with surgeries.  I have asked him to hold naltrexone for 3 days prior to his surgery.  He should otherwise take his normal medicines.  Will check a CBC and basic metabolic panel as requested by the surgical center.  Assuming these come back within normal limits, he is cleared for surgery without recommendations for additional evaluation or diagnostic testing.

## 2024-07-04 LAB
ANION GAP SERPL CALCULATED.3IONS-SCNC: 8 MMOL/L (ref 7–15)
BUN SERPL-MCNC: 15.5 MG/DL (ref 6–20)
CALCIUM SERPL-MCNC: 9.3 MG/DL (ref 8.6–10)
CHLORIDE SERPL-SCNC: 100 MMOL/L (ref 98–107)
CREAT SERPL-MCNC: 0.97 MG/DL (ref 0.67–1.17)
DEPRECATED HCO3 PLAS-SCNC: 28 MMOL/L (ref 22–29)
EGFRCR SERPLBLD CKD-EPI 2021: >90 ML/MIN/1.73M2
FERRITIN SERPL-MCNC: 58 NG/ML (ref 31–409)
GLUCOSE SERPL-MCNC: 89 MG/DL (ref 70–99)
POTASSIUM SERPL-SCNC: 4.9 MMOL/L (ref 3.4–5.3)
SODIUM SERPL-SCNC: 136 MMOL/L (ref 135–145)

## 2024-07-09 NOTE — Clinical Note
Can we send this patient a [employer] message with the cost of care phone number so he can estimate the cost of all of these labs? July 9, 2024      Na SALGUERO Genet  [address]   [place] [address]        To Whom It May Concern:    Na SALGUERO La Alianza  was seen on 7-9-2024.  Please excuse her  until 7- due to illness.        Sincerely,        KIARRA Alvarenga CNP

## 2024-08-12 ENCOUNTER — TRANSFERRED RECORDS (OUTPATIENT)
Dept: HEALTH INFORMATION MANAGEMENT | Facility: CLINIC | Age: 54
End: 2024-08-12
Payer: COMMERCIAL

## 2024-08-29 ENCOUNTER — TRANSFERRED RECORDS (OUTPATIENT)
Dept: HEALTH INFORMATION MANAGEMENT | Facility: CLINIC | Age: 54
End: 2024-08-29
Payer: COMMERCIAL

## 2024-10-04 ENCOUNTER — OFFICE VISIT (OUTPATIENT)
Dept: FAMILY MEDICINE | Facility: CLINIC | Age: 54
End: 2024-10-04
Payer: COMMERCIAL

## 2024-10-04 VITALS
WEIGHT: 187.3 LBS | SYSTOLIC BLOOD PRESSURE: 138 MMHG | OXYGEN SATURATION: 98 % | HEART RATE: 87 BPM | HEIGHT: 68 IN | DIASTOLIC BLOOD PRESSURE: 80 MMHG | TEMPERATURE: 98.2 F | RESPIRATION RATE: 16 BRPM | BODY MASS INDEX: 28.39 KG/M2

## 2024-10-04 DIAGNOSIS — Z01.818 PREOP GENERAL PHYSICAL EXAM: Primary | ICD-10-CM

## 2024-10-04 DIAGNOSIS — M25.551 RIGHT HIP PAIN: ICD-10-CM

## 2024-10-04 PROBLEM — R79.89 DECREASED TESTOSTERONE LEVEL: Status: ACTIVE | Noted: 2023-12-04

## 2024-10-04 LAB
ERYTHROCYTE [DISTWIDTH] IN BLOOD BY AUTOMATED COUNT: 13.1 % (ref 10–15)
HCT VFR BLD AUTO: 45.1 % (ref 40–53)
HGB BLD-MCNC: 15.4 G/DL (ref 13.3–17.7)
MCH RBC QN AUTO: 31.4 PG (ref 26.5–33)
MCHC RBC AUTO-ENTMCNC: 34.1 G/DL (ref 31.5–36.5)
MCV RBC AUTO: 92 FL (ref 78–100)
PLATELET # BLD AUTO: 206 10E3/UL (ref 150–450)
RBC # BLD AUTO: 4.9 10E6/UL (ref 4.4–5.9)
WBC # BLD AUTO: 7.2 10E3/UL (ref 4–11)

## 2024-10-04 PROCEDURE — 80048 BASIC METABOLIC PNL TOTAL CA: CPT | Performed by: FAMILY MEDICINE

## 2024-10-04 PROCEDURE — 85027 COMPLETE CBC AUTOMATED: CPT | Performed by: FAMILY MEDICINE

## 2024-10-04 PROCEDURE — 36415 COLL VENOUS BLD VENIPUNCTURE: CPT | Performed by: FAMILY MEDICINE

## 2024-10-04 PROCEDURE — 99214 OFFICE O/P EST MOD 30 MIN: CPT | Performed by: FAMILY MEDICINE

## 2024-10-04 ASSESSMENT — ENCOUNTER SYMPTOMS
VOMITING: 0
ABDOMINAL PAIN: 0
SORE THROAT: 0
HEMATURIA: 0
FEVER: 0
COLOR CHANGE: 0
COUGH: 0
ARTHRALGIAS: 0
EYE PAIN: 0
SHORTNESS OF BREATH: 0
SEIZURES: 0
DYSURIA: 0
BACK PAIN: 0
CHILLS: 0
PALPITATIONS: 0

## 2024-10-04 NOTE — PROGRESS NOTES
Preoperative Evaluation  [facility]  [address]  [facility] [address]  Phone: [phone]  Fax: [phone]  Primary Provider: Sanjay Mahan MD  Pre-op Performing Provider: Sanjay Mahan MD  Oct 4, 2024             10/4/2024   Surgical Information   What procedure is being done? right hip   Facility or Hospital where procedure/surgery will be performed: [facility]   Who is doing the procedure / surgery? Dr Urena   Date of surgery / procedure: oct 25   Time of surgery / procedure: dont know yet   Where do you plan to recover after surgery? at home with family        Fax number for surgical facility: [phone]     Assessment & Plan     The proposed surgical procedure is considered INTERMEDIATE risk.    Problem List Items Addressed This Visit       Right hip pain     Preoperative assessment.  The patient's approximately 3-4 months status post left total hip arthroplasty.  Now he is ready for the right hip.  He had no complications at all and minimal pain with his recent surgery.  He has been walking on a regular basis without any limitation and generally feels well.  No history of underlying heart disease, stroke, chronic kidney disease, diabetes, heart failure.  His surgery center has requested a basic metabolic panel and a CBC.  These will be completed today.  He will take his medicines as he normally does.  Able to perform greater than 4 metabolic equivalents.  He is cleared for surgery without recommendations for additional evaluation or diagnostic testing.         Relevant Orders    Basic metabolic panel  (Ca, Cl, CO2, Creat, Gluc, K, Na, BUN) (Completed)    CBC with platelets (Completed)     Other Visit Diagnoses       Preop general physical exam    -  Primary    Relevant Orders    Basic metabolic panel  (Ca, Cl, CO2, Creat, Gluc, K, Na, BUN) (Completed)    CBC with platelets (Completed)           - No identified additional risk factors other than  previously addressed    Preoperative Medication Instructions  Antiplatelet or Anticoagulation Medication Instructions   - Patient is on no antiplatelet or anticoagulation medications.    Additional Medication Instructions  Take all scheduled medications on the day of surgery    Recommendation  Approval given to proceed with proposed procedure, without further diagnostic evaluation.    Samuel Hua is a 54 year old, presenting for the following:  Pre-Op Exam          10/4/2024     2:43 PM   Additional Questions   Roomed by xl     HPI related to upcoming procedure: Long history of hip pain.  He has undergone conservative cares.  Ongoing pain.  He tolerated the surgery on his left side in July without any complications.  Now able to walk 2 to 3 miles at a time.  Overall feels well.        10/4/2024   Pre-Op Questionnaire   Have you ever had a heart attack or stroke? No   Have you ever had surgery on your heart or blood vessels, such as a stent placement, a coronary artery bypass, or surgery on an artery in your head, neck, heart, or legs? No   Do you have chest pain with activity? No   Do you have a history of heart failure? No   Do you currently have a cold, bronchitis or symptoms of other infection? No   Do you have a cough, shortness of breath, or wheezing? No   Do you or anyone in your family have previous history of blood clots? No   Do you or does anyone in your family have a serious bleeding problem such as prolonged bleeding following surgeries or cuts? No   Have you ever had problems with anemia or been told to take iron pills? No   Have you had any abnormal blood loss such as black, tarry or bloody stools? No   Have you ever had a blood transfusion? No   Are you willing to have a blood transfusion if it is medically needed before, during, or after your surgery? Yes   Have you or any of your relatives ever had problems with anesthesia? No   Do you have sleep apnea, excessive snoring or daytime drowsiness?  "No   Do you have any artifical heart valves or other implanted medical devices like a pacemaker, defibrillator, or continuous glucose monitor? No   Do you have artificial joints? (!) YES.  ~3 months s/p left hip replacement   Are you allergic to latex? No       Patient Active Problem List    Diagnosis Date Noted    Right hip pain 07/03/2024     Priority: Medium    Decreased testosterone level 12/04/2023     Priority: Medium    Fatigue, unspecified type 05/16/2023     Priority: Medium    Hypercholesteremia 03/16/2023     Priority: Medium    Elevated fasting glucose 03/16/2023     Priority: Medium    Chronic intractable headache, unspecified headache type 03/16/2023     Priority: Medium    Hereditary hemochromatosis (H) 07/20/2022     Priority: Medium    Severe episode of recurrent major depressive disorder, without psychotic features (H) 07/07/2017     Priority: Medium      Past Medical History:   Diagnosis Date    Depressive disorder 2004, 2017    Generalized anxiety disorder 07/07/2017    Insomnia 07/07/2017    Severe episode of recurrent major depressive disorder, without psychotic features (H) 07/07/2017     Past Surgical History:   Procedure Laterality Date    HERNIA REPAIR  as a child    ORTHOPEDIC SURGERY       Current Outpatient Medications   Medication Sig Dispense Refill    B-D SYRINGE LUER-AGNIESZKA 1 ML MISC       BD DISP NEEDLES 25G X 5/8\" MISC       BD HYPODERMIC NEEDLE 18G X 1\" MISC       buPROPion (WELLBUTRIN XL) 150 MG 24 hr tablet Take 150 mg by mouth every morning      desvenlafaxine (PRISTIQ) 50 MG 24 hr tablet Take 1 tablet (50 mg) by mouth daily 30 tablet 0    Methylcobalamin 1 MG CHEW Take 1 chew tab by mouth daily      multivitamin w/minerals (MULTI-VITAMIN) tablet Take 1 tablet by mouth daily      testosterone cypionate (DEPOTESTOSTERONE) 200 MG/ML injection Inject 200 mg into the muscle twice a week      vitamin C with B complex (B COMPLEX-C) tablet Take 1 tablet by mouth daily      Vitamin D " "(Cholecalciferol) 25 MCG (1000 UT) TABS          No Known Allergies     Social History     Tobacco Use    Smoking status: Former     Current packs/day: 0.00     Average packs/day: 0.5 packs/day for 5.0 years (2.5 ttl pk-yrs)     Types: Cigarettes     Start date: 1986     Quit date: 1991     Years since quittin.4     Passive exposure: Past    Smokeless tobacco: Never   Substance Use Topics    Alcohol use: Not Currently     History   Drug Use Unknown         Review of Systems   Constitutional:  Negative for chills and fever.   HENT:  Negative for ear pain and sore throat.    Eyes:  Negative for pain and visual disturbance.   Respiratory:  Negative for cough and shortness of breath.    Cardiovascular:  Negative for chest pain and palpitations.   Gastrointestinal:  Negative for abdominal pain and vomiting.   Genitourinary:  Negative for dysuria and hematuria.   Musculoskeletal:  Negative for arthralgias and back pain.   Skin:  Negative for color change and rash.   Neurological:  Negative for seizures and syncope.   All other systems reviewed and are negative.        Objective    /80 (BP Location: Left arm, Patient Position: Sitting, Cuff Size: Adult Regular)   Pulse 87   Temp 98.2  F (36.8  C) (Oral)   Resp 16   Ht 1.727 m (5' 8\")   Wt 85 kg (187 lb 4.8 oz)   SpO2 98%   BMI 28.48 kg/m     Estimated body mass index is 28.48 kg/m  as calculated from the following:    Height as of this encounter: 1.727 m (5' 8\").    Weight as of this encounter: 85 kg (187 lb 4.8 oz).  Physical Exam  Vitals reviewed.   Constitutional:       General: He is not in acute distress.     Appearance: Normal appearance. He is not ill-appearing.   HENT:      Head: Normocephalic and atraumatic.      Right Ear: External ear normal.      Left Ear: External ear normal.      Nose: Nose normal.      Mouth/Throat:      Pharynx: Oropharynx is clear. No oropharyngeal exudate or posterior oropharyngeal erythema.   Eyes:      " General: No scleral icterus.        Right eye: No discharge.         Left eye: No discharge.      Extraocular Movements: Extraocular movements intact.      Conjunctiva/sclera: Conjunctivae normal.      Pupils: Pupils are equal, round, and reactive to light.   Neck:      Comments: No thyromegaly.  Cardiovascular:      Rate and Rhythm: Normal rate and regular rhythm.      Heart sounds: Normal heart sounds. No murmur heard.     No friction rub. No gallop.   Pulmonary:      Effort: Pulmonary effort is normal. No respiratory distress.      Breath sounds: Normal breath sounds. No wheezing or rales.   Abdominal:      General: There is no distension.      Palpations: Abdomen is soft. There is no mass.      Tenderness: There is no abdominal tenderness.   Musculoskeletal:         General: No signs of injury. Normal range of motion.      Cervical back: Normal range of motion.      Right lower leg: No edema.      Left lower leg: No edema.   Lymphadenopathy:      Cervical: No cervical adenopathy.   Skin:     General: Skin is warm.      Coloration: Skin is not jaundiced.      Findings: No rash.   Neurological:      General: No focal deficit present.      Mental Status: He is alert and oriented to person, place, and time.      Cranial Nerves: No cranial nerve deficit.      Deep Tendon Reflexes: Reflexes normal.   Psychiatric:         Mood and Affect: Mood normal.         Recent Labs   Lab Test 07/03/24  1122 11/08/23  0740   HGB 15.5 15.8     --      --    POTASSIUM 4.9  --    CR 0.97  --         Diagnostics  Recent Results (from the past 48 hour(s))   CBC with platelets    Collection Time: 10/04/24  3:28 PM   Result Value Ref Range    WBC Count 7.2 4.0 - 11.0 10e3/uL    RBC Count 4.90 4.40 - 5.90 10e6/uL    Hemoglobin 15.4 13.3 - 17.7 g/dL    Hematocrit 45.1 40.0 - 53.0 %    MCV 92 78 - 100 fL    MCH 31.4 26.5 - 33.0 pg    MCHC 34.1 31.5 - 36.5 g/dL    RDW 13.1 10.0 - 15.0 %    Platelet Count 206 150 - 450 10e3/uL    Basic metabolic panel  (Ca, Cl, CO2, Creat, Gluc, K, Na, BUN)    Collection Time: 10/04/24  3:28 PM   Result Value Ref Range    Sodium 136 135 - 145 mmol/L    Potassium 4.8 3.4 - 5.3 mmol/L    Chloride 102 98 - 107 mmol/L    Carbon Dioxide (CO2) 26 22 - 29 mmol/L    Anion Gap 8 7 - 15 mmol/L    Urea Nitrogen 7.7 6.0 - 20.0 mg/dL    Creatinine 1.03 0.67 - 1.17 mg/dL    GFR Estimate 86 >60 mL/min/1.73m2    Calcium 8.7 (L) 8.8 - 10.4 mg/dL    Glucose 81 70 - 99 mg/dL      No EKG required, no history of coronary heart disease, significant arrhythmia, peripheral arterial disease or other structural heart disease.    Revised Cardiac Risk Index (RCRI)  The patient has the following serious cardiovascular risks for perioperative complications:   - No serious cardiac risks = 0 points     RCRI Interpretation: 0 points: Class I (very low risk - 0.4% complication rate)         Signed Electronically by: Sanjay Mahan MD  A copy of this evaluation report is provided to the requesting physician.

## 2024-10-04 NOTE — ASSESSMENT & PLAN NOTE
Preoperative assessment.  The patient's approximately 3-4 months status post left total hip arthroplasty.  Now he is ready for the right hip.  He had no complications at all and minimal pain with his recent surgery.  He has been walking on a regular basis without any limitation and generally feels well.  No history of underlying heart disease, stroke, chronic kidney disease, diabetes, heart failure.  His surgery center has requested a basic metabolic panel and a CBC.  These will be completed today.  He will take his medicines as he normally does.  Able to perform greater than 4 metabolic equivalents.  He is cleared for surgery without recommendations for additional evaluation or diagnostic testing.

## 2024-10-04 NOTE — PATIENT INSTRUCTIONS
How to Take Your Medication Before Surgery  Preoperative Medication Instructions   Antiplatelet or Anticoagulation Medication Instructions   - Patient is on no antiplatelet or anticoagulation medications.    Additional Medication Instructions  Take all scheduled medications on the day of surgery       Patient Education   Preparing for Your Surgery  For Adults  Getting started  In most cases, a nurse will call to review your health history and instructions. They will give you an arrival time based on your scheduled surgery time. Please be ready to share:  Your doctor's clinic name and phone number  Your medical, surgical, and anesthesia history  A list of allergies and sensitivities  A list of medicines, including herbal treatments and over-the-counter drugs  Whether the patient has a legal guardian (ask how to send us the papers in advance)  Note: You may not receive a call if you were seen at our PAC (Preoperative Assessment Center).  Please tell us if you're pregnant--or if there's any chance you might be pregnant. Some surgeries may injure a fetus (unborn baby), so they require a pregnancy test. Surgeries that are safe for a fetus don't always need a test, and you can choose whether to have one.   Preparing for surgery  Within 10 to 30 days of surgery: Have a pre-op exam (sometimes called an H&P, or History and Physical). This can be done at a clinic or pre-operative center.  If you're having a , you may not need this exam. Talk to your care team.  At your pre-op exam, talk to your care team about all medicines you take. (This includes CBD oil and any drugs, such as THC, marijuana, and other forms of cannabis.) If you need to stop any medicine before surgery, ask when to start taking it again.  This is for your safety. Many medicines and drugs can make you bleed too much during surgery. Some change how well surgery (anesthesia) drugs work.  Call your insurance company to let them know you're having  surgery. (If you don't have insurance, call [phone].)  Call your clinic if there's any change in your health. This includes a scrape or scratch near the surgery site, or any signs of a cold (sore throat, runny nose, cough, rash, fever).  Eating and drinking guidelines  For your safety: Unless your surgeon tells you otherwise, follow the guidelines below.  Eat and drink as normal until 8 hours before you arrive for surgery. After that, no food or milk. You can spit out gum when you arrive.  Drink clear liquids until 2 hours before you arrive. These are liquids you can see through, like water, Gatorade, and Propel Water. They also include plain black coffee and tea (no cream or milk).  No alcohol for 24 hours before you arrive. The night before surgery, stop any drinks that contain THC.  If your care team tells you to take medicine on the morning of surgery, it's okay to take it with a sip of water. No other medicines or drugs are allowed (including CBD oil)--follow your care team's instructions.  If you have questions the day of surgery, call your hospital or surgery center.   Preventing infection  Shower or bathe the night before and the morning of surgery. Follow the instructions your clinic gave you. (If no instructions, use regular soap.)  Don't shave or clip hair near your surgery site. We'll remove the hair if needed.  Don't smoke or vape the morning of surgery. No chewing tobacco for 6 hours before you arrive. A nicotine patch is okay. You may spit out nicotine gum when you arrive.  For some surgeries, the surgeon will tell you to fully quit smoking and nicotine.  We will make every effort to keep you safe from infection. We will:  Clean our hands often with soap and water (or an alcohol-based hand rub).  Clean the skin at your surgery site with a special soap that kills germs.  Give you a special gown to keep you warm. (Cold raises the risk of infection.)  Wear hair covers, masks, gowns, and gloves  during surgery.  Give antibiotic medicine, if prescribed. Not all surgeries need this medicine.  What to bring on the day of surgery  Photo ID and insurance card  Copy of your health care directive, if you have one  Glasses and hearing aids (bring cases)  You can't wear contacts during surgery  Inhaler and eye drops, if you use them (tell us about these when you arrive)  CPAP machine or breathing device, if you use them  A few personal items, if spending the night  If you have . . .  A pacemaker, ICD (cardiac defibrillator), or other implant: Bring the ID card.  An implanted stimulator: Bring the remote control.  A legal guardian: Bring a copy of the certified (court-stamped) guardianship papers.  Please remove any jewelry, including body piercings. Leave jewelry and other valuables at home.  If you're going home the day of surgery  You must have a responsible adult drive you home. They should stay with you overnight as well.  If you don't have someone to stay with you, and you aren't safe to go home alone, we may keep you overnight. Insurance often won't pay for this.  After surgery  If it's hard to control your pain or you need more pain medicine, please call your surgeon's office.  Questions?   If you have any questions for your care team, list them here:   ____________________________________________________________________________________________________________________________________________________________________________________________________________________________________________________________  For informational purposes only. Not to replace the advice of your health care provider. Copyright   2003, 2019 [facility]. All rights reserved. Clinically reviewed by Remy Vaca MD. [employer] [id number] - REV 08/24.

## 2024-10-05 LAB
ANION GAP SERPL CALCULATED.3IONS-SCNC: 8 MMOL/L (ref 7–15)
BUN SERPL-MCNC: 7.7 MG/DL (ref 6–20)
CALCIUM SERPL-MCNC: 8.7 MG/DL (ref 8.8–10.4)
CHLORIDE SERPL-SCNC: 102 MMOL/L (ref 98–107)
CREAT SERPL-MCNC: 1.03 MG/DL (ref 0.67–1.17)
EGFRCR SERPLBLD CKD-EPI 2021: 86 ML/MIN/1.73M2
GLUCOSE SERPL-MCNC: 81 MG/DL (ref 70–99)
HCO3 SERPL-SCNC: 26 MMOL/L (ref 22–29)
POTASSIUM SERPL-SCNC: 4.8 MMOL/L (ref 3.4–5.3)
SODIUM SERPL-SCNC: 136 MMOL/L (ref 135–145)

## 2024-10-25 ENCOUNTER — TRANSFERRED RECORDS (OUTPATIENT)
Dept: HEALTH INFORMATION MANAGEMENT | Facility: CLINIC | Age: 54
End: 2024-10-25
Payer: COMMERCIAL

## 2024-11-06 ENCOUNTER — TRANSFERRED RECORDS (OUTPATIENT)
Dept: HEALTH INFORMATION MANAGEMENT | Facility: CLINIC | Age: 54
End: 2024-11-06
Payer: COMMERCIAL

## 2024-12-04 ENCOUNTER — TRANSFERRED RECORDS (OUTPATIENT)
Dept: HEALTH INFORMATION MANAGEMENT | Facility: CLINIC | Age: 54
End: 2024-12-04
Payer: COMMERCIAL

## 2025-03-15 NOTE — TELEPHONE ENCOUNTER
Prescription approved per [facility] Refill Protocol.  ELOISE Collins RN  [facility]     

## 2025-06-14 ENCOUNTER — HEALTH MAINTENANCE LETTER (OUTPATIENT)
Age: 55
End: 2025-06-14